# Patient Record
Sex: FEMALE | Race: WHITE | NOT HISPANIC OR LATINO | Employment: OTHER | URBAN - METROPOLITAN AREA
[De-identification: names, ages, dates, MRNs, and addresses within clinical notes are randomized per-mention and may not be internally consistent; named-entity substitution may affect disease eponyms.]

---

## 2017-01-03 ENCOUNTER — OFFICE VISIT (OUTPATIENT)
Dept: INTERNAL MEDICINE | Facility: CLINIC | Age: 72
End: 2017-01-03
Payer: MEDICARE

## 2017-01-03 VITALS
DIASTOLIC BLOOD PRESSURE: 70 MMHG | BODY MASS INDEX: 24.12 KG/M2 | HEART RATE: 96 BPM | WEIGHT: 153.69 LBS | TEMPERATURE: 97 F | OXYGEN SATURATION: 99 % | SYSTOLIC BLOOD PRESSURE: 118 MMHG | HEIGHT: 67 IN

## 2017-01-03 DIAGNOSIS — T50.Z95A VACCINE REACTION, INITIAL ENCOUNTER: Primary | ICD-10-CM

## 2017-01-03 PROCEDURE — 1126F AMNT PAIN NOTED NONE PRSNT: CPT | Mod: S$GLB,,, | Performed by: NURSE PRACTITIONER

## 2017-01-03 PROCEDURE — 1157F ADVNC CARE PLAN IN RCRD: CPT | Mod: S$GLB,,, | Performed by: NURSE PRACTITIONER

## 2017-01-03 PROCEDURE — 99999 PR PBB SHADOW E&M-EST. PATIENT-LVL IV: CPT | Mod: PBBFAC,,, | Performed by: NURSE PRACTITIONER

## 2017-01-03 PROCEDURE — 1160F RVW MEDS BY RX/DR IN RCRD: CPT | Mod: S$GLB,,, | Performed by: NURSE PRACTITIONER

## 2017-01-03 PROCEDURE — 99213 OFFICE O/P EST LOW 20 MIN: CPT | Mod: S$GLB,,, | Performed by: NURSE PRACTITIONER

## 2017-01-03 PROCEDURE — 1159F MED LIST DOCD IN RCRD: CPT | Mod: S$GLB,,, | Performed by: NURSE PRACTITIONER

## 2017-01-03 NOTE — PROGRESS NOTES
Subjective:       Patient ID: Renee Atreaga is a 71 y.o. female.    Chief Complaint: Medication Reaction (localized reaction to flu shot in left arm)    HPI Comments: Patient presents today with redness to the left arm. She reports getting her flu shot on Friday and the redness appeared about 24 hours later.     Medication Reaction   This is a new problem. The current episode started in the past 7 days. The problem occurs constantly. The problem has been gradually improving. Associated symptoms include a rash (LUE). Pertinent negatives include no chest pain, chills, fever, vomiting or weakness. Nothing aggravates the symptoms. She has tried nothing for the symptoms. The treatment provided no relief.     Review of Systems   Constitutional: Negative for chills and fever.   Cardiovascular: Negative for chest pain.   Gastrointestinal: Negative for vomiting.   Skin: Positive for rash (LUE).   Neurological: Negative for weakness.       Objective:      Physical Exam   Constitutional: She is oriented to person, place, and time. She appears well-developed and well-nourished. No distress.   HENT:   Head: Normocephalic and atraumatic.   Neurological: She is alert and oriented to person, place, and time.   Skin:        Psychiatric: She has a normal mood and affect.   Vitals reviewed.      Assessment:       1. Vaccine reaction, initial encounter        Plan:   Vaccine reaction, initial encounter  Comments:  localized skin reaction; ice 15-20 min 3-4 times daily, tylenol PRN    Informed patient this appears to be a localized reaction and is self-limiting. RTC for any new or worsening symptom. Patient verbalized understanding and agreement  Return if symptoms worsen or fail to improve.

## 2017-01-04 ENCOUNTER — OFFICE VISIT (OUTPATIENT)
Dept: DERMATOLOGY | Facility: CLINIC | Age: 72
End: 2017-01-04
Payer: MEDICARE

## 2017-01-04 DIAGNOSIS — L82.1 SEBORRHEIC KERATOSIS: Primary | ICD-10-CM

## 2017-01-04 PROCEDURE — 1160F RVW MEDS BY RX/DR IN RCRD: CPT | Mod: S$GLB,,,

## 2017-01-04 PROCEDURE — 1157F ADVNC CARE PLAN IN RCRD: CPT | Mod: S$GLB,,,

## 2017-01-04 PROCEDURE — 1159F MED LIST DOCD IN RCRD: CPT | Mod: S$GLB,,,

## 2017-01-04 PROCEDURE — 99212 OFFICE O/P EST SF 10 MIN: CPT | Mod: S$GLB,,,

## 2017-01-04 PROCEDURE — 99999 PR PBB SHADOW E&M-EST. PATIENT-LVL III: CPT | Mod: PBBFAC,,,

## 2017-01-04 NOTE — MR AVS SNAPSHOT
[facility] - Dermatology  [address] [facility] [address] [place] [address]  Phone: [phone]  Fax: [phone]                  Renee Arteaga   2017 1:00 PM   Office Visit    Description:  Female : 1945   Provider:  Elisa Posey NP   Department[facility]  [facility]           Reason for Visit     Spot                To Do List           Future Appointments        Provider Department Dept Phone    2017 1:00 PM Elisa Posey NP [facility] Dermatology [phone]    2017 1:45 PM [facility] [phone]      Goals (5 Years of Data)     None      [facility]     [facility] On Call Nurse Care Line -  Assistance  Registered nurses in the [facility] On Call Center provide clinical advisement, health education, appointment booking, and other advisory services.  Call for this free service at [phone].             Medications           Message regarding Medications     Verify the changes and/or additions to your medication regime listed below are the same as discussed with your clinician today.  If any of these changes or additions are incorrect, please notify your healthcare provider.             Verify that the below list of medications is an accurate representation of the medications you are currently taking.  If none reported, the list may be blank. If incorrect, please contact your healthcare provider. Carry this list with you in case of emergency.           Current Medications     aspirin (ECOTRIN) 81 MG EC tablet Take 1 tablet by mouth Daily. 1 Tablet, Delayed Release (E.C.) Oral Every day    clonazePAM (KLONOPIN) 0.5 MG tablet Take 1 tablet (0.5 mg total) by mouth every evening.    cyclobenzaprine (FLEXERIL) 5 MG tablet Take 1 tablet (5 mg total) by mouth 3 (three) times daily as needed for Muscle spasms.    fenofibrate (TRICOR) 54 MG tablet Take 1 tablet (54 mg total) by mouth once daily.    metoprolol succinate (TOPROL-XL) 25 MG 24 hr tablet Take 25  mg by mouth once daily.    pantoprazole (PROTONIX) 40 MG tablet Take 1 tablet (40 mg total) by mouth once daily.    pravastatin (PRAVACHOL) 40 MG tablet Take 1 tablet by mouth Daily. 1 Tablet Oral Every day           Clinical Reference Information           Allergies as of 1/4/2017     No Known Allergies      Immunizations Administered on Date of Encounter - 1/4/2017     None      [facility] Sign-Up     Activating your [facility] account is as easy as 1-2-3!     1) Visit [email or web address], select Sign Up Now, enter this activation code and your date of birth, then select Next.  [email or web address]  Expires: 2/13/2017 11:51 AM      2) Create a username and password to use when you visit [facility] in the future and select a security question in case you lose your password and select Next.    3) Enter your e-mail address and click Sign Up!    Additional Information  If you have questions, please e-mail [email or web address] or call [phone] to talk to our [facility] staff. Remember, [facility] is NOT to be used for urgent needs. For medical emergencies, dial 911.         Instructions    SEBORRHEIC KERATOSES        What causes seborrheic keratoses?    Seborrheic keratoses are harmless, common skin growths that first appear during adult life.  As time goes by, more growths appear.  Some persons have a very large number of them.  Seborrheic keratoses appear on both covered and uncovered parts of the body; they are not caused by sunlight.  The tendency to develop seborrheic keratoses is inherited.    Seborrheic keratoses are harmless and never become malignant.  They begin as slightly raised, light brown spots.  Gradually they thicken and take on a rough wartlike surface.  They slowly darken and may turn black.  These color changes are harmless.  Seborrheic keratoses are superficial and look as if they were stuck on the skin.  Persons who have had several seborrheic keratoses can usually recognize this type of benign growth.   However, if you are concerned or unsure about any growth, consult me.    Treatment    Seborrheic keratoses can easily be removed in the office.  The only reason for removing a seborrheic keratosis is your wish to get rid of it.

## 2017-01-04 NOTE — PATIENT INSTRUCTIONS
SEBORRHEIC KERATOSES        What causes seborrheic keratoses?    Seborrheic keratoses are harmless, common skin growths that first appear during adult life.  As time goes by, more growths appear.  Some persons have a very large number of them.  Seborrheic keratoses appear on both covered and uncovered parts of the body; they are not caused by sunlight.  The tendency to develop seborrheic keratoses is inherited.    Seborrheic keratoses are harmless and never become malignant.  They begin as slightly raised, light brown spots.  Gradually they thicken and take on a rough wartlike surface.  They slowly darken and may turn black.  These color changes are harmless.  Seborrheic keratoses are superficial and look as if they were stuck on the skin.  Persons who have had several seborrheic keratoses can usually recognize this type of benign growth.  However, if you are concerned or unsure about any growth, consult me.    Treatment    Seborrheic keratoses can easily be removed in the office.  The only reason for removing a seborrheic keratosis is your wish to get rid of it.

## 2017-01-04 NOTE — PROGRESS NOTES
Subjective:       Patient ID:  Renee Arteaga is a 71 y.o. female who presents for   Chief Complaint   Patient presents with    Spot     dark spot on left leg. x3 months. no treament.     Spot  - Initial  Affected locations: left lower leg  Duration: 3 months  Signs / symptoms: asymptomatic  Aggravated by: nothing  Relieving factors/Treatments tried: nothing    Pt declines complete skin exam today    PMH:  No h/o MM or NMSC    Review of Systems   Constitutional: Negative for fever, chills and fatigue.   Skin: Positive for activity-related sunscreen use. Negative for daily sunscreen use.   Hematologic/Lymphatic: Does not bruise/bleed easily (on asa).        Objective:    Physical Exam   Constitutional: She appears well-developed and well-nourished. No distress.   Neurological: She is alert and oriented to person, place, and time. She is not disoriented.   Psychiatric: She has a normal mood and affect.   Skin:   Areas Examined (abnormalities noted in diagram):   LLE Inspection Performed              Diagram Legend     Erythematous scaling macule/papule c/w actinic keratosis       Vascular papule c/w angioma      Pigmented verrucoid papule/plaque c/w seborrheic keratosis      Yellow umbilicated papule c/w sebaceous hyperplasia      Irregularly shaped tan macule c/w lentigo     1-2 mm smooth white papules consistent with Milia      Movable subcutaneous cyst with punctum c/w epidermal inclusion cyst      Subcutaneous movable cyst c/w pilar cyst      Firm pink to brown papule c/w dermatofibroma      Pedunculated fleshy papule(s) c/w skin tag(s)      Evenly pigmented macule c/w junctional nevus     Mildly variegated pigmented, slightly irregular-bordered macule c/w mildly atypical nevus      Flesh colored to evenly pigmented papule c/w intradermal nevus       Pink pearly papule/plaque c/w basal cell carcinoma      Erythematous hyperkeratotic cursted plaque c/w SCC      Surgical scar with no sign of skin cancer recurrence       Open and closed comedones      Inflammatory papules and pustules      Verrucoid papule consistent consistent with wart     Erythematous eczematous patches and plaques     Dystrophic onycholytic nail with subungual debris c/w onychomycosis     Umbilicated papule    Erythematous-base heme-crusted tan verrucoid plaque consistent with inflamed seborrheic keratosis     Erythematous Silvery Scaling Plaque c/w Psoriasis     See annotation      Assessment / Plan:        Seborrheic keratosis- left lower leg   These are benign inherited growths without a malignant potential. Reassurance given to patient. No treatment is necessary.            Return if symptoms worsen or fail to improve.

## 2017-01-18 RX ORDER — METOPROLOL SUCCINATE 25 MG/1
25 TABLET, EXTENDED RELEASE ORAL DAILY
Qty: 90 TABLET | Refills: 3 | Status: SHIPPED | OUTPATIENT
Start: 2017-01-18 | End: 2017-03-29 | Stop reason: SDUPTHER

## 2017-03-06 ENCOUNTER — HOSPITAL ENCOUNTER (OUTPATIENT)
Dept: RADIOLOGY | Facility: HOSPITAL | Age: 72
Discharge: HOME | End: 2017-03-06
Attending: ORTHOPAEDIC SURGERY
Payer: MEDICARE

## 2017-03-06 ENCOUNTER — OFFICE VISIT (OUTPATIENT)
Dept: ORTHOPEDICS | Facility: CLINIC | Age: 72
End: 2017-03-06
Payer: MEDICARE

## 2017-03-06 VITALS
DIASTOLIC BLOOD PRESSURE: 77 MMHG | BODY MASS INDEX: 24.22 KG/M2 | SYSTOLIC BLOOD PRESSURE: 133 MMHG | WEIGHT: 154.31 LBS | HEART RATE: 82 BPM | HEIGHT: 67 IN

## 2017-03-06 DIAGNOSIS — M25.552 BILATERAL HIP PAIN: ICD-10-CM

## 2017-03-06 DIAGNOSIS — M25.551 BILATERAL HIP PAIN: ICD-10-CM

## 2017-03-06 DIAGNOSIS — M81.0 SENILE OSTEOPOROSIS: ICD-10-CM

## 2017-03-06 DIAGNOSIS — M25.551 BILATERAL HIP PAIN: Primary | ICD-10-CM

## 2017-03-06 DIAGNOSIS — M54.5 CHRONIC LOW BACK PAIN, UNSPECIFIED BACK PAIN LATERALITY, WITH SCIATICA PRESENCE UNSPECIFIED: ICD-10-CM

## 2017-03-06 DIAGNOSIS — M47.817 LUMBOSACRAL SPONDYLOSIS WITHOUT MYELOPATHY: ICD-10-CM

## 2017-03-06 DIAGNOSIS — G89.29 CHRONIC LOW BACK PAIN, UNSPECIFIED BACK PAIN LATERALITY, WITH SCIATICA PRESENCE UNSPECIFIED: ICD-10-CM

## 2017-03-06 DIAGNOSIS — M81.8 IDIOPATHIC OSTEOPOROSIS: ICD-10-CM

## 2017-03-06 DIAGNOSIS — M16.12 ARTHRITIS OF LEFT HIP: Primary | ICD-10-CM

## 2017-03-06 DIAGNOSIS — M16.11 ARTHRITIS OF RIGHT HIP: ICD-10-CM

## 2017-03-06 DIAGNOSIS — M25.552 BILATERAL HIP PAIN: Primary | ICD-10-CM

## 2017-03-06 PROCEDURE — 72110 X-RAY EXAM L-2 SPINE 4/>VWS: CPT | Mod: 26,,, | Performed by: RADIOLOGY

## 2017-03-06 PROCEDURE — 1160F RVW MEDS BY RX/DR IN RCRD: CPT | Mod: S$GLB,,, | Performed by: ORTHOPAEDIC SURGERY

## 2017-03-06 PROCEDURE — 72220 X-RAY EXAM SACRUM TAILBONE: CPT | Mod: 26,,, | Performed by: RADIOLOGY

## 2017-03-06 PROCEDURE — 99204 OFFICE O/P NEW MOD 45 MIN: CPT | Mod: S$GLB,,, | Performed by: ORTHOPAEDIC SURGERY

## 2017-03-06 PROCEDURE — 72110 X-RAY EXAM L-2 SPINE 4/>VWS: CPT | Mod: TC

## 2017-03-06 PROCEDURE — 1159F MED LIST DOCD IN RCRD: CPT | Mod: S$GLB,,, | Performed by: ORTHOPAEDIC SURGERY

## 2017-03-06 PROCEDURE — 72220 X-RAY EXAM SACRUM TAILBONE: CPT | Mod: TC

## 2017-03-06 PROCEDURE — 1157F ADVNC CARE PLAN IN RCRD: CPT | Mod: S$GLB,,, | Performed by: ORTHOPAEDIC SURGERY

## 2017-03-06 PROCEDURE — 73521 X-RAY EXAM HIPS BI 2 VIEWS: CPT | Mod: 26,,, | Performed by: RADIOLOGY

## 2017-03-06 PROCEDURE — 1125F AMNT PAIN NOTED PAIN PRSNT: CPT | Mod: S$GLB,,, | Performed by: ORTHOPAEDIC SURGERY

## 2017-03-06 PROCEDURE — 73521 X-RAY EXAM HIPS BI 2 VIEWS: CPT | Mod: TC

## 2017-03-06 PROCEDURE — 99999 PR PBB SHADOW E&M-EST. PATIENT-LVL III: CPT | Mod: PBBFAC,,, | Performed by: ORTHOPAEDIC SURGERY

## 2017-03-06 RX ORDER — MELOXICAM 15 MG/1
15 TABLET ORAL DAILY
Qty: 30 TABLET | Refills: 2 | Status: SHIPPED | OUTPATIENT
Start: 2017-03-06 | End: 2017-09-01 | Stop reason: SDUPTHER

## 2017-03-06 NOTE — MR AVS SNAPSHOT
[facility]  [address]  [place] [address]  Phone: [phone]  Fax: [phone]                  Renee Arteaga   3/6/2017 1:00 PM   Office Visit    Description:  Female : 1945   Provider:  Jc Miranda MD   Department:  [facility]           Reason for Visit     Left Thigh - Pain           Diagnoses this Visit        Comments    Arthritis of left hip    -  Primary     Senile osteoporosis         Idiopathic osteoporosis         Arthritis of right hip         Lumbosacral spondylosis without myelopathy                To Do List           Future Appointments        Provider Department Dept Phone    2017 11:00 AM [facility] [phone]    2017 11:30 AM ON [facility] [phone]      Goals (5 Years of Data)     None      Follow-Up and Disposition     Return in about 5 weeks (around 4/10/2017).       These Medications        Disp Refills Start End    meloxicam (MOBIC) 15 MG tablet 30 tablet 2 3/6/2017     Take 1 tablet (15 mg total) by mouth once daily. - Oral    Pharmacy: [facility] [id number] [place] [address] Ph #: [phone]         [facility]     [facility] Care [facility] -  Assistance  Registered nurses in the [facility] Center provide clinical advisement, health education, appointment booking, and other advisory services.  Call for this free service at [phone].             Medications           Message regarding Medications     Verify the changes and/or additions to your medication regime listed below are the same as discussed with your clinician today.  If any of these changes or additions are incorrect, please notify your healthcare provider.        START taking these NEW medications        Refills    meloxicam (MOBIC) 15 MG tablet 2    Sig: Take 1 tablet (15 mg total) by mouth once daily.    Class: Normal    Route: Oral           Verify that  "the below list of medications is an accurate representation of the medications you are currently taking.  If none reported, the list may be blank. If incorrect, please contact your healthcare provider. Carry this list with you in case of emergency.           Current Medications     aspirin (ECOTRIN) 81 MG EC tablet Take 1 tablet by mouth Daily. 1 Tablet, Delayed Release (E.C.) Oral Every day    clonazePAM (KLONOPIN) 0.5 MG tablet Take 1 tablet (0.5 mg total) by mouth every evening.    fenofibrate (TRICOR) 54 MG tablet Take 1 tablet (54 mg total) by mouth once daily.    meloxicam (MOBIC) 15 MG tablet Take 1 tablet (15 mg total) by mouth once daily.    metoprolol succinate (TOPROL-XL) 25 MG 24 hr tablet Take 1 tablet (25 mg total) by mouth once daily.    pantoprazole (PROTONIX) 40 MG tablet Take 1 tablet (40 mg total) by mouth once daily.    pravastatin (PRAVACHOL) 40 MG tablet Take 1 tablet by mouth Daily. 1 Tablet Oral Every day           Clinical Reference Information           Your Vitals Were     BP Pulse Height Weight BMI    133/77 82 5' 7" (1.702 m) 70 kg (154 lb 5.2 oz) 24.17 kg/m2      Blood Pressure          Most Recent Value    BP  133/77      Allergies as of 3/6/2017     No Known Allergies      Immunizations Administered on Date of Encounter - 3/6/2017     None      Orders Placed During Today's Visit     Future Labs/Procedures Expected by Expires    DXA Bone Density Spine And Hip  3/6/2017 3/6/2018      [facility] Sign-Up     Activating your [facility] account is as easy as 1-2-3!     1) Visit [email or web address], select Sign Up Now, enter this activation code and your date of birth, then select Next.  [email or web address]  Expires: 4/20/2017  2:29 PM      2) Create a username and password to use when you visit [facility] in the future and select a security question in case you lose your password and select Next.    3) Enter your e-mail address and click Sign Up!    Additional Information  If you have questions, " please e-mail [email or web address] or call [phone] to talk to our [facility] staff. Remember, [facility] is NOT to be used for urgent needs. For medical emergencies, dial 911.         Language Assistance Services     ATTENTION: Language assistance services are available, free of charge. Please call [phone].      ATENCIÓN: Si habla español, tiene a baum disposición servicios gratuitos de asistencia lingüística. Llame al [phone].     CHÚ Ý: N?u b?n nói Ti?ng Vi?t, có các d?ch v? h? tr? ngôn ng? mi?n phí dành cho b?n. G?i s? [phone].         [facility] complies with applicable Federal civil rights laws and does not discriminate on the basis of race, color, national origin, age, disability, or sex.

## 2017-03-06 NOTE — PROGRESS NOTES
Subjective:      Patient ID: Renee Arteaga is a 71 y.o. female.    Chief Complaint: Pain of the Left Thigh    HPI: The patient is seen today with discomfort in her left hip and leg and perhaps knee.  She has some radiating discomfort into the anterior thigh.  She has left groin pain.  She has some degree of right hip discomfort however her left hip hurts worse.  She rates her current level of discomfort at a 3 on a pain scale of 1-10.  Housekeeping and vacuuming or getting more and more difficult for the patient.  Review of Systems   Constitution: Negative for chills, decreased appetite, weight gain and weight loss.   HENT: Negative for congestion, ear pain, hearing loss and sore throat.    Eyes: Negative for blurred vision, double vision, vision loss in left eye, vision loss in right eye and visual disturbance.   Cardiovascular: Negative for chest pain, irregular heartbeat, leg swelling and palpitations.   Respiratory: Negative for cough and shortness of breath.    Endocrine: Negative for cold intolerance and heat intolerance.   Hematologic/Lymphatic: Negative for adenopathy. Does not bruise/bleed easily.   Skin: Negative for color change, nail changes, rash and suspicious lesions.   Musculoskeletal: Positive for arthritis and joint pain.   Gastrointestinal: Negative for abdominal pain, constipation, diarrhea, heartburn, nausea and vomiting.   Genitourinary: Negative for bladder incontinence and dysuria.   Neurological: Negative for dizziness, paresthesias, sensory change and tremors.   Psychiatric/Behavioral: Negative for altered mental status, depression, memory loss and substance abuse.   Allergic/Immunologic: Negative for hives and persistent infections.         Objective:            General    Nursing note and vitals reviewed.  Constitutional: She is oriented to person, place, and time. She appears well-developed and well-nourished.   HENT:   Head: Normocephalic.   Nose: Nose normal.   Eyes: EOM are normal.  Pupils are equal, round, and reactive to light.   Neck: Normal range of motion. Neck supple.   Cardiovascular: Normal rate and regular rhythm.    Pulmonary/Chest: Effort normal.   Abdominal: Soft. She exhibits no distension. There is no tenderness.   Neurological: She is alert and oriented to person, place, and time.   Psychiatric: She has a normal mood and affect. Her behavior is normal.     General Musculoskeletal Exam   Gait: antalgic   Pelvic Obliquity: none    Right Ankle/Foot Exam   Right ankle exam is normal.    Range of Motion   The patient has normal right ankle ROM.    Alignment   Forefoot Alignment: normal    Muscle Strength   The patient has normal right ankle strength.    Tests   Anterior drawer: negative  Varus tilt: negative  Heel Walk: able to perform  Tiptoe Walk: able to perform    Other   Sensation: normal  Peroneal Subluxation: negative    Left Ankle/Foot Exam   Left ankle exam is normal.    Range of Motion   The patient has normal left ankle ROM.     Alignment   Forefoot Alignment: normal    Muscle Strength   The patient has normal left ankle strength.    Tests   Anterior drawer: negative  Varus tilt: negative  Heel Walk: able to perform  Tiptoe Walk: able to perform    Other   Sensation: normal  Peroneal Subluxation: negative    Right Knee Exam   Right knee exam is normal.    Inspection   Erythema: absent  Swelling: absent  Effusion: effusion  Deformity: deformity  Bruising: absent    Tenderness   The patient is tender to palpation of the patella.    Crepitus   The patient has crepitus of the patella.    Range of Motion   The patient has normal right knee ROM.    Tests   Meniscus   Fernanda:  Medial - negative Lateral - negative  Ligament Examination Lachman: normal (-1 to 2mm) PCL-Posterior Drawer: normal (0 to 2mm)     MCL - Valgus: normal (0 to 2mm)  LCL - Varus: normalPivot Shift: normal (Equal)  Posterolateral Corner: unstable (>15 degrees difference)  Patella   Patellar Apprehension:  negative  Passive Patellar Tilt: neutral  Patellar Tracking: normal  Patellar Grind: positive    Other   Sensation: normal    Left Knee Exam   Left knee exam is normal.    Inspection   Erythema: absent  Swelling: absent  Effusion: absent  Deformity: deformity  Bruising: absent    Tenderness   The patient tender to palpation of the patella.    Crepitus   The patient has crepitus of the patella.    Range of Motion   The patient has normal left knee ROM.    Tests   Meniscus   Fernanda:  Medial - negative Lateral - negative  Stability Lachman: normal (-1 to 2mm) PCL-Posterior Drawer: normal (0 to 2mm)  MCL - Valgus: normal (0 to 2mm)  LCL - Varus: normal (0 to 2mm)Pivot Shift: normal (Equal)  Posterolateral Corner: unstable (>15 degrees difference)  Patella   Patellar Apprehension: negative  Passive Patellar Tilt: neutral  Patellar Tracking: normal  Patellar Grind: positive    Other   Sensation: normal    Right Hip Exam     Inspection   Swelling: absent  Bruising: absent    Tenderness   The patient tender to palpation of the rectus insertion.    Range of Motion   Internal Rotation: abnormal   External Rotation: abnormal   Abduction: abnormal   Adduction: abnormal     Muscle Strength   The patient has normal right hip strength.    Tests   Pain w/ forced internal rotation (MICHELLE): present  Pain w/ forced external rotation (FADIR): present  Log Roll: positive    Other   Sensation: normal  Left Hip Exam     Inspection   Swelling: absent  Bruising: absent    Tenderness   The patient tender to palpation of the rectus insertion.    Range of Motion   Internal Rotation: abnormal   External Rotation: abnormal   Abduction: abnormal   Adduction: abnormal     Muscle Strength   The patient has normal left hip strength.     Tests   Pain w/ forced external rotation (FADIR): present  Log Roll: positive    Other   Sensation: normal    Comments:  The patient has more stiffness in the left hip than the right hip.      Back (L-Spine &  T-Spine) / Neck (C-Spine) Exam     Tenderness Posterior midline palpation reveals tenderness of the Lower L-Spine. Right paramedian tenderness of the Lower L-Spine. Left paramedian tenderness of the Lower L-Spine.     Back (L-Spine & T-Spine) Range of Motion   Extension: normal   Flexion: normal     Neck (C-Spine) Range of Motion   Flexion:     Normal  Extension: Normal  Right Lateral Bend: normal  Left Lateral Bend: normal  Right Rotation: normal  Left Rotation: normal    Spinal Sensation   Right Side Sensation  C-Spine Level: normal   L-Spine Level: normal  S-Spine Level: normal  T-Spine Level: normal  Left Side Sensation  C-Spine Level: normal  L-Spine Level: normal  S-Spine Level: normal  T-Spine Level: normal    Other She has no scoliosis .  Head Tilt:  Negative      Right Hand/Wrist Exam   Right hand exam is normal.    Inspection   Deformity: Wrist - deformity     Range of Motion   The patient has normal right hand/wrist ROM.    Tests   Phalens Sign: negative  Tinels Sign (Medial Nerve): negative  Finkelstein: negative  Cubital Tunnel Compression Test: negative      Other     Neuorologic Exam    Median Distribution: normal  Ulnar Distribution: normal  Radial Distribution: normal      Left Hand/Wrist Exam   Left hand exam is normal.    Inspection   Deformity: Wrist - absent     Range of Motion   The patient has normal left hand/wrist ROM.    Tests   Phalens Sign: negative  Tinels Sign (Medial Nerve): negative  Finkelstein: negative  Cubital Tunnel Compression Test: negative      Other     Sensory Exam  Median Distribution: normal  Ulnar Distribution: normal  Radial Distribution: normal      Right Elbow Exam   Right elbow exam is normal.    Inspection   Effusion: absent  Bruising: absent  Deformity: absent    Range of Motion   The patient has normal right elbow ROM.    Tests Tinel's Sign (cubital tunnel): negative    Other   Sensation: normal      Left Elbow Exam   Left elbow exam is normal.    Inspection    Effusion: absent  Bruising: absent  Deformity: absent    Range of Motion   The patient has normal left elbow ROM.    Tests Tinel's Sign (cubital tunnel): negative    Other   Sensation: normal    Right Shoulder Exam   Right shoulder exam is normal.    Range of Motion   Active Abduction: normal   Passive Abduction: normal   Extension: normal   Forward Flexion: normal   Forward Elevation: normal  Adduction: normal  External Rotation 0 degrees: normal   Internal Rotation 0 degrees: normal     Muscle Strength   The patient has normal right shoulder strength.    Tests & Signs   Apprehension: negative  Cross Arm: negative  Impingement: negative    Other   Sensation: normal    Left Shoulder Exam   Left shoulder exam is normal.    Range of Motion   Active Abduction: normal   Passive Abduction: normal   Extension: normal   Forward Flexion: normal   Forward Elevation: normal  Adduction: normal  External Rotation 0 degrees: normal   Internal Rotation 0 degrees: normal     Muscle Strength   The patient has normal left shoulder strength.    Tests & Signs   Apprehension: negative  Cross Arm: negative  Impingement: negative    Other   Sensation: normal       Muscle Strength   Right Upper Extremity   Wrist Extension: 5/5/5   Wrist Flexion: 5/5/5   : 5/5/5   Elbow Pronation:  5/5   Elbow Supination:  5/5   Elbow Extension: 5/5  Elbow Flexion: 5/5  Intrinsics: 5/5  Left Upper Extremity  Wrist Extension: /5/5   Wrist Flexion: /5/5   :  5/5/5   Elbow Pronation:  5/5   Elbow Supination:  5/5   Elbow Extension: 5/5  Elbow Flexion: 5/5  Intrinsics: 5/5  Right Lower Extremity   Hip Abduction: 5/5   Quadriceps:  5/5   Hamstrin/5   Left Lower Extremity   Hip Abduction: 5/5   Quadriceps:  5/5   Hamstrin/5     Reflexes     Left Side  Biceps:  2+  Triceps:  2+  Quadriceps:  2+  Achilles:  2+    Right Side   Biceps:  2+  Triceps:  2+  Quadriceps:  2+  Achilles:  2+    Vascular Exam     Right Pulses  Dorsalis Pedis:       2+  Posterior Tibial:      2+  Radial:                    2+      Left Pulses  Dorsalis Pedis:      2+  Posterior Tibial:      2+  Radial:                    2+      Capillary Refill  Right Hand: normal capillary refill  Left Hand: normal capillary refill                X-RAY:   Comparison: None     Bilateral standing ap knees  bilateral sunrise views  left lateral    Findings:    Juxta-articular osteopenia.  Minimal degenerative changes bilaterally.  No fracture, dislocation or LEFT effusion.  Normal and symmetric articulation at the patellofemoral joints.     AP pelvis and two views both hips.    Findings: There is generalized osteopenia.  Degenerative changes present in the hips, sacroiliac joints, and lower lumbar spine.  No intraosseous lesion or radiographic evidence of AVN.  No acute fracture or dislocation.  Lumbar spine five views.    Findings: There is generalized osteopenia.  Development of sacralization of the L5 vertebral segment.  Advanced degenerative changes with vertebral endplate spurring, disc space narrowing, vacuum disc phenomenon, and facet arthropathy at multiple levels.  Pedicles appear intact.  Very minimal retrolisthesis suggested at L2-L3 and L3-L4.  No compression fracture.  Sacrum and coccyx three views.    Findings: There is developmental partial sacralization of the L5 vertebral segment.  Degenerative changes in the hips, sacroiliac joints, and lower lumbar spine.  No fracture or intraosseous lesion.        Assessment:             Encounter Diagnoses   Name Primary?    Senile osteoporosis     Idiopathic osteoporosis     Arthritis of left hip Yes    Arthritis of right hip     Lumbosacral spondylosis without myelopathy           Plan:     The patient was started on Bobek 7.5 mg daily ×3 days and then 15 mg daily thereafter with food if she tolerates the medicine.  She is to take her usual dose of Protonix, 40 mg daily on a regular basis.  If the patient does not tolerate Mobic, she  will contact the office and we will try her on Celebrex.  If and when the patient's discomfort level increases to a steady 7 on a pain scale of 1-10, she may desire to have a left total hip replacement performed.  This would be through a direct anterior approach.  The patient will be seen in follow-up in 5 weeks for clinical check on her progress.

## 2017-03-16 RX ORDER — PRAVASTATIN SODIUM 40 MG/1
40 TABLET ORAL DAILY
Qty: 90 TABLET | Refills: 3 | Status: SHIPPED | OUTPATIENT
Start: 2017-03-16 | End: 2018-10-12 | Stop reason: SDUPTHER

## 2017-03-28 ENCOUNTER — PATIENT OUTREACH (OUTPATIENT)
Dept: ADMINISTRATIVE | Facility: HOSPITAL | Age: 72
End: 2017-03-28

## 2017-03-29 ENCOUNTER — LAB VISIT (OUTPATIENT)
Dept: LAB | Facility: HOSPITAL | Age: 72
End: 2017-03-29
Attending: FAMILY MEDICINE
Payer: MEDICARE

## 2017-03-29 ENCOUNTER — OFFICE VISIT (OUTPATIENT)
Dept: INTERNAL MEDICINE | Facility: CLINIC | Age: 72
End: 2017-03-29
Payer: MEDICARE

## 2017-03-29 VITALS
SYSTOLIC BLOOD PRESSURE: 106 MMHG | HEART RATE: 62 BPM | TEMPERATURE: 98 F | HEIGHT: 67 IN | BODY MASS INDEX: 24.56 KG/M2 | OXYGEN SATURATION: 97 % | DIASTOLIC BLOOD PRESSURE: 64 MMHG | WEIGHT: 156.5 LBS

## 2017-03-29 DIAGNOSIS — I50.9 CONGESTIVE HEART FAILURE, UNSPECIFIED CONGESTIVE HEART FAILURE CHRONICITY, UNSPECIFIED CONGESTIVE HEART FAILURE TYPE: ICD-10-CM

## 2017-03-29 DIAGNOSIS — Z13.9 SCREENING: Primary | ICD-10-CM

## 2017-03-29 DIAGNOSIS — G47.00 INSOMNIA, UNSPECIFIED TYPE: ICD-10-CM

## 2017-03-29 DIAGNOSIS — K21.9 GASTROESOPHAGEAL REFLUX DISEASE WITHOUT ESOPHAGITIS: ICD-10-CM

## 2017-03-29 DIAGNOSIS — Z13.9 SCREENING: ICD-10-CM

## 2017-03-29 PROCEDURE — 1157F ADVNC CARE PLAN IN RCRD: CPT | Mod: S$GLB,,, | Performed by: FAMILY MEDICINE

## 2017-03-29 PROCEDURE — 36415 COLL VENOUS BLD VENIPUNCTURE: CPT

## 2017-03-29 PROCEDURE — 99213 OFFICE O/P EST LOW 20 MIN: CPT | Mod: S$GLB,,, | Performed by: FAMILY MEDICINE

## 2017-03-29 PROCEDURE — 86803 HEPATITIS C AB TEST: CPT

## 2017-03-29 PROCEDURE — 1160F RVW MEDS BY RX/DR IN RCRD: CPT | Mod: S$GLB,,, | Performed by: FAMILY MEDICINE

## 2017-03-29 PROCEDURE — 99999 PR PBB SHADOW E&M-EST. PATIENT-LVL III: CPT | Mod: PBBFAC,,, | Performed by: FAMILY MEDICINE

## 2017-03-29 PROCEDURE — 1159F MED LIST DOCD IN RCRD: CPT | Mod: S$GLB,,, | Performed by: FAMILY MEDICINE

## 2017-03-29 PROCEDURE — 1126F AMNT PAIN NOTED NONE PRSNT: CPT | Mod: S$GLB,,, | Performed by: FAMILY MEDICINE

## 2017-03-29 RX ORDER — PRAVASTATIN SODIUM 40 MG/1
40 TABLET ORAL DAILY
Qty: 90 TABLET | Refills: 3 | Status: CANCELLED | OUTPATIENT
Start: 2017-03-29

## 2017-03-29 RX ORDER — PANTOPRAZOLE SODIUM 40 MG/1
40 TABLET, DELAYED RELEASE ORAL DAILY
Qty: 30 TABLET | Refills: 6 | Status: SHIPPED | OUTPATIENT
Start: 2017-03-29 | End: 2017-09-01 | Stop reason: SDUPTHER

## 2017-03-29 RX ORDER — METOPROLOL SUCCINATE 25 MG/1
25 TABLET, EXTENDED RELEASE ORAL DAILY
Qty: 90 TABLET | Refills: 3 | Status: SHIPPED | OUTPATIENT
Start: 2017-03-29 | End: 2017-09-01 | Stop reason: SDUPTHER

## 2017-03-29 RX ORDER — ASPIRIN 81 MG/1
81 TABLET ORAL
Status: CANCELLED | COMMUNITY
Start: 2017-03-29

## 2017-03-29 RX ORDER — MELOXICAM 15 MG/1
15 TABLET ORAL DAILY
Qty: 30 TABLET | Refills: 2 | Status: CANCELLED | OUTPATIENT
Start: 2017-03-29

## 2017-03-29 RX ORDER — FENOFIBRATE 54 MG/1
54 TABLET ORAL DAILY
Qty: 30 TABLET | Refills: 11 | Status: CANCELLED | OUTPATIENT
Start: 2017-03-29

## 2017-03-29 RX ORDER — CLONAZEPAM 0.5 MG/1
0.5 TABLET ORAL NIGHTLY
Qty: 30 TABLET | Refills: 1 | Status: SHIPPED | OUTPATIENT
Start: 2017-03-29 | End: 2017-06-09 | Stop reason: SDUPTHER

## 2017-03-29 NOTE — PROGRESS NOTES
Subjective:       Patient ID: Renee Arteaga is a 71 y.o. female.    Chief Complaint: Medication Refill    HPI Ms. Arteaga presents today with her  for medication refills. She has no complaints today.   Dxa scan ordered by ortho scheduled for August.     Review of Systems   Constitutional: Negative for activity change, appetite change, fatigue and fever.   HENT: Negative for congestion, ear pain and sinus pressure.    Eyes: Negative for pain and visual disturbance.   Respiratory: Negative for cough, chest tightness and wheezing.    Cardiovascular: Negative for chest pain, palpitations and leg swelling.   Gastrointestinal: Negative for abdominal distention, abdominal pain, constipation, diarrhea, nausea and vomiting.   Endocrine: Negative for polydipsia and polyuria.   Genitourinary: Negative for difficulty urinating, dyspareunia, dysuria, flank pain and hematuria.   Musculoskeletal: Negative for arthralgias and back pain.   Skin: Negative for rash.   Neurological: Negative for dizziness, tremors, syncope, weakness, numbness and headaches.   Psychiatric/Behavioral: Negative for agitation and confusion.           Past Medical History:   Diagnosis Date    Anxiety     Arthritis     Depression     Heart murmur     Hyperlipidemia     Scoliosis     Tachycardia        Objective:        Physical Exam   Constitutional: She is oriented to person, place, and time. She appears well-nourished.   HENT:   Head: Normocephalic and atraumatic.   Cardiovascular: Normal heart sounds.    Pulmonary/Chest: Effort normal and breath sounds normal.   Musculoskeletal: Normal range of motion.   Neurological: She is alert and oriented to person, place, and time.   Psychiatric: She has a normal mood and affect. Her behavior is normal.   Vitals reviewed.        Assessment/Plan:   Screening  -     Hepatitis C antibody; Future; Expected date: 3/29/17    Gastroesophageal reflux disease without esophagitis  -     pantoprazole (PROTONIX) 40  MG tablet; Take 1 tablet (40 mg total) by mouth once daily.  Dispense: 30 tablet; Refill: 6    Insomnia, unspecified type  -     clonazePAM (KLONOPIN) 0.5 MG tablet; Take 1 tablet (0.5 mg total) by mouth every evening.  Dispense: 30 tablet; Refill: 1    Congestive heart failure, unspecified congestive heart failure chronicity, unspecified congestive heart failure type  -     metoprolol succinate (TOPROL-XL) 25 MG 24 hr tablet; Take 1 tablet (25 mg total) by mouth once daily.  Dispense: 90 tablet; Refill: 3      Return if symptoms worsen or fail to improve.    Odessa Julio MD  [facility]   Family Medicine

## 2017-03-29 NOTE — MR AVS SNAPSHOT
[facility]  [address]  [place] [address]  Phone: [phone]  Fax: [phone]                  Renee Arteaga   3/29/2017 11:00 AM   Office Visit    Description:  Female : 1945   Provider:  Odessa Julio MD   Department:  [facility]           Reason for Visit     Medication Refill           Diagnoses this Visit        Comments    Screening    -  Primary     Gastroesophageal reflux disease without esophagitis         Arthritis of left hip         Arthritis of right hip         Lumbosacral spondylosis without myelopathy         Hypertriglyceridemia         Insomnia, unspecified type         Congestive heart failure, unspecified congestive heart failure chronicity, unspecified congestive heart failure type                To Do List           Future Appointments        Provider Department Dept Phone    2017 11:00 AM Jc Miranda MD [facility] - Orthopedics [phone]    2017 11:30 AM [facility] [phone]      Goals (5 Years of Data)     None       These Medications        Disp Refills Start End    pantoprazole (PROTONIX) 40 MG tablet 30 tablet 6 3/29/2017     Take 1 tablet (40 mg total) by mouth once daily. - Oral    Pharmacy: [facility] [id number] [place] [address] Ph #: [phone]       metoprolol succinate (TOPROL-XL) 25 MG 24 hr tablet 90 tablet 3 3/29/2017     Take 1 tablet (25 mg total) by mouth once daily. - Oral    Pharmacy: [facility] [id number] Ph #: [phone]       clonazePAM (KLONOPIN) 0.5 MG tablet 30 tablet 1 3/29/2017     Take 1 tablet (0.5 mg total) by mouth every evening. - Oral    Pharmacy: [facility] [id number] Ph #: [phone]         [facility]     [facility] On Call Nurse Care Line -  Assistance  Registered nurses in the [facility] provide clinical advisement, health education,  "appointment booking, and other advisory services.  Call for this free service at [phone].             Medications           Message regarding Medications     Verify the changes and/or additions to your medication regime listed below are the same as discussed with your clinician today.  If any of these changes or additions are incorrect, please notify your healthcare provider.             Verify that the below list of medications is an accurate representation of the medications you are currently taking.  If none reported, the list may be blank. If incorrect, please contact your healthcare provider. Carry this list with you in case of emergency.           Current Medications     aspirin (ECOTRIN) 81 MG EC tablet Take 1 tablet by mouth Daily. 1 Tablet, Delayed Release (E.C.) Oral Every day    fenofibrate (TRICOR) 54 MG tablet Take 1 tablet (54 mg total) by mouth once daily.    meloxicam (MOBIC) 15 MG tablet Take 1 tablet (15 mg total) by mouth once daily.    pravastatin (PRAVACHOL) 40 MG tablet Take 1 tablet (40 mg total) by mouth once daily. 1 Tablet Oral Every day    clonazePAM (KLONOPIN) 0.5 MG tablet Take 1 tablet (0.5 mg total) by mouth every evening.    metoprolol succinate (TOPROL-XL) 25 MG 24 hr tablet Take 1 tablet (25 mg total) by mouth once daily.    pantoprazole (PROTONIX) 40 MG tablet Take 1 tablet (40 mg total) by mouth once daily.           Clinical Reference Information           Your Vitals Were     BP Pulse Temp Height Weight SpO2    106/64 (BP Location: Right arm, Patient Position: Sitting, BP Method: Manual) 62 97.7 °F (36.5 °C) (Tympanic) 5' 7" (1.702 m) 71 kg (156 lb 8.4 oz) 97%    BMI                24.52 kg/m2          Blood Pressure          Most Recent Value    BP  106/64      Allergies as of 3/29/2017     No Known Allergies      Immunizations Administered on Date of Encounter - 3/29/2017     None      Orders Placed During Today's Visit     Future Labs/Procedures Expected by Expires    " Hepatitis C antibody  3/29/2017 5/28/2018      [facility] Sign-Up     Activating your [facility] account is as easy as 1-2-3!     1) Visit [email or web address], select Sign Up Now, enter this activation code and your date of birth, then select Next.  [email or web address]  Expires: 4/20/2017  3:29 PM      2) Create a username and password to use when you visit [facility] in the future and select a security question in case you lose your password and select Next.    3) Enter your e-mail address and click Sign Up!    Additional Information  If you have questions, please e-mail [email or web address] or call [phone] to talk to our [facility] staff. Remember, [facility] is NOT to be used for urgent needs. For medical emergencies, dial 911.         Language Assistance Services     ATTENTION: Language assistance services are available, free of charge. Please call [phone].      ATENCIÓN: Si habla español, tiene a baum disposición servicios gratuitos de asistencia lingüística. Llame al [phone].     BRANDON Ý: N?u b?n nói Ti?ng Vi?t, có các d?ch v? h? tr? ngôn ng? mi?n phí dành cho b?n. G?i s? [phone].         [facility] complies with applicable Federal civil rights laws and does not discriminate on the basis of race, color, national origin, age, disability, or sex.

## 2017-03-30 LAB — HCV AB SERPL QL IA: NEGATIVE

## 2017-06-09 DIAGNOSIS — G47.00 INSOMNIA, UNSPECIFIED TYPE: ICD-10-CM

## 2017-06-09 RX ORDER — CLONAZEPAM 0.5 MG/1
0.5 TABLET ORAL NIGHTLY
Qty: 30 TABLET | Refills: 1 | Status: SHIPPED | OUTPATIENT
Start: 2017-06-09 | End: 2017-07-19

## 2017-07-19 ENCOUNTER — OFFICE VISIT (OUTPATIENT)
Dept: INTERNAL MEDICINE | Facility: CLINIC | Age: 72
End: 2017-07-19
Payer: MEDICARE

## 2017-07-19 VITALS
DIASTOLIC BLOOD PRESSURE: 60 MMHG | BODY MASS INDEX: 22.56 KG/M2 | WEIGHT: 143.75 LBS | OXYGEN SATURATION: 99 % | HEART RATE: 70 BPM | SYSTOLIC BLOOD PRESSURE: 118 MMHG | HEIGHT: 67 IN | TEMPERATURE: 97 F

## 2017-07-19 DIAGNOSIS — B35.3 TINEA PEDIS OF LEFT FOOT: Primary | ICD-10-CM

## 2017-07-19 DIAGNOSIS — G47.00 INSOMNIA, UNSPECIFIED TYPE: ICD-10-CM

## 2017-07-19 DIAGNOSIS — F41.9 ANXIETY: ICD-10-CM

## 2017-07-19 PROCEDURE — 1125F AMNT PAIN NOTED PAIN PRSNT: CPT | Mod: S$GLB,,, | Performed by: FAMILY MEDICINE

## 2017-07-19 PROCEDURE — 99999 PR PBB SHADOW E&M-EST. PATIENT-LVL III: CPT | Mod: PBBFAC,,, | Performed by: FAMILY MEDICINE

## 2017-07-19 PROCEDURE — 1159F MED LIST DOCD IN RCRD: CPT | Mod: S$GLB,,, | Performed by: FAMILY MEDICINE

## 2017-07-19 PROCEDURE — 99214 OFFICE O/P EST MOD 30 MIN: CPT | Mod: S$GLB,,, | Performed by: FAMILY MEDICINE

## 2017-07-19 RX ORDER — ALPRAZOLAM 0.5 MG/1
0.5 TABLET ORAL 3 TIMES DAILY
Qty: 90 TABLET | Refills: 1 | Status: SHIPPED | OUTPATIENT
Start: 2017-07-19 | End: 2017-09-01 | Stop reason: SDUPTHER

## 2017-07-19 RX ORDER — FLUCONAZOLE 200 MG/1
200 TABLET ORAL DAILY
Qty: 15 TABLET | Refills: 1 | Status: SHIPPED | OUTPATIENT
Start: 2017-07-19 | End: 2017-08-18

## 2017-07-19 NOTE — PROGRESS NOTES
Subjective:       Patient ID: Renee Arteaga is a 72 y.o. female.    Chief Complaint: Rash (left foot/x3 wks) and Anxiety    HPI Ms Arteaga presents today with a couple complaints She has a left foot rash for 3 weeks. She has been using hydrocortisone and an antifungal.  Itching. Left foot. She used antifungal 3-4 days and stopped went back to hydrocortisone.   She recently lost her  whose health declined rather quickly and now she is dealing with daughter who has been bedridden. She started having symptoms of falling 6 months ago and the past 3 months she hasn't been able to move. She has been in the hospital trying to figure out what is going on and there has been no answers.   Ms. Arteaga is her sole care giver at this time.     Can't sleep she can't think and is overwhelmed with everything to take care of with grieving and taking care of her daughter. She has had a lot on her with the  and the legal paperwork with the passing of her  who was also my patient. They had the one daughter so although she has some help she doesn't have a lot of help.     Review of Systems   Constitutional: Positive for activity change and fatigue. Negative for fever.   HENT: Negative.    Respiratory: Negative.    Endocrine: Negative.    Genitourinary: Negative.    Musculoskeletal: Negative.    Skin: Positive for rash.   Psychiatric/Behavioral: Positive for agitation, decreased concentration and sleep disturbance. Negative for suicidal ideas. The patient is nervous/anxious.            Past Medical History:   Diagnosis Date    Anxiety     Arthritis     Depression     Heart murmur     Hyperlipidemia     Scoliosis     Tachycardia      Objective:        Physical Exam   Constitutional: She is oriented to person, place, and time. She appears well-developed and well-nourished.   HENT:   Head: Normocephalic and atraumatic.   Cardiovascular: Normal heart sounds.    Pulmonary/Chest: Effort normal and breath sounds  normal.   Neurological: She is alert and oriented to person, place, and time.   Psychiatric: Thought content normal.   Tearful   Vitals reviewed.        Assessment/Plan:   Tinea pedis of left foot  -     fluconazole (DIFLUCAN) 200 MG Tab; Take 1 tablet (200 mg total) by mouth once daily.  Dispense: 15 tablet; Refill: 1  Restart antifungal cream    Anxiety  -     alprazolam (XANAX) 0.5 MG tablet; Take 1 tablet (0.5 mg total) by mouth 3 (three) times daily.  Dispense: 90 tablet; Refill: 1  Discontinued Klonopin at night and replaced it with Xanax. She has needed it 2 times a day during the day.   Explained that I did not want her taking it 3 times a day for a long time and that the plan should be to come down to 2 times a day and then back to one a night.     Insomnia, unspecified type  -     alprazolam (XANAX) 0.5 MG tablet; Take 1 tablet (0.5 mg total) by mouth 3 (three) times daily.  Dispense: 90 tablet; Refill: 1  Discontinued Klonopin at night and replaced it with Xanax. She has needed it 2 times a day during the day.   Explained that I did not want her taking it 3 times a day for a long time and that the plan should be to come down to 2 times a day and then back to one a night.     Return in about 4 weeks (around 8/16/2017).    Odessa Julio MD  [facility]   Family Medicine

## 2017-07-24 ENCOUNTER — TELEPHONE (OUTPATIENT)
Dept: INTERNAL MEDICINE | Facility: CLINIC | Age: 72
End: 2017-07-24

## 2017-07-24 DIAGNOSIS — Z12.31 SCREENING MAMMOGRAM, ENCOUNTER FOR: Primary | ICD-10-CM

## 2017-07-24 NOTE — TELEPHONE ENCOUNTER
----- Message from Elba Casas sent at 7/24/2017  9:11 AM CDT -----  Contact: pt  She's requesting an appointment for her mammogram, please add orders, please advise  [phone]

## 2017-07-25 ENCOUNTER — TELEPHONE (OUTPATIENT)
Dept: INTERNAL MEDICINE | Facility: CLINIC | Age: 72
End: 2017-07-25

## 2017-07-25 NOTE — TELEPHONE ENCOUNTER
Wrong number listed, [phone].  Scheduled mammogram for 08/29/17 after her f/u appt with Dr Julio. Mailed appt slip.

## 2017-07-25 NOTE — TELEPHONE ENCOUNTER
----- Message from Rossy Pete sent at 7/25/2017  9:39 AM CDT -----  Contact: pt   States she missed a call and is calling to see if it were 's office that called for her mammo and can be reached at [phone] or cell [phone]//thanks/dbw

## 2017-07-26 ENCOUNTER — HOSPITAL ENCOUNTER (OUTPATIENT)
Dept: RADIOLOGY | Facility: HOSPITAL | Age: 72
Discharge: HOME | End: 2017-07-26
Attending: FAMILY MEDICINE
Payer: MEDICARE

## 2017-07-26 VITALS — BODY MASS INDEX: 22.44 KG/M2 | WEIGHT: 143 LBS | HEIGHT: 67 IN

## 2017-07-26 DIAGNOSIS — N63.0 LUMP OR MASS IN BREAST: ICD-10-CM

## 2017-07-26 DIAGNOSIS — Z12.31 SCREENING MAMMOGRAM, ENCOUNTER FOR: ICD-10-CM

## 2017-07-26 PROCEDURE — 76642 ULTRASOUND BREAST LIMITED: CPT | Mod: 26,LT,, | Performed by: RADIOLOGY

## 2017-07-26 PROCEDURE — 77066 DX MAMMO INCL CAD BI: CPT | Mod: 26,,, | Performed by: RADIOLOGY

## 2017-07-26 PROCEDURE — 77062 BREAST TOMOSYNTHESIS BI: CPT | Mod: TC,PO

## 2017-07-26 PROCEDURE — 76642 ULTRASOUND BREAST LIMITED: CPT | Mod: TC,PO,LT

## 2017-07-26 PROCEDURE — 77062 BREAST TOMOSYNTHESIS BI: CPT | Mod: 26,,, | Performed by: RADIOLOGY

## 2017-07-28 ENCOUNTER — TELEPHONE (OUTPATIENT)
Dept: INTERNAL MEDICINE | Facility: CLINIC | Age: 72
End: 2017-07-28

## 2017-08-04 ENCOUNTER — TELEPHONE (OUTPATIENT)
Dept: INTERNAL MEDICINE | Facility: CLINIC | Age: 72
End: 2017-08-04

## 2017-08-04 NOTE — TELEPHONE ENCOUNTER
----- Message from Nia Morgan sent at 8/4/2017  9:38 AM CDT -----  Patient states that August 30 is okay for her appointment.  She will be there.   Call her at [phone].                                                         avilez

## 2017-09-01 ENCOUNTER — OFFICE VISIT (OUTPATIENT)
Dept: INTERNAL MEDICINE | Facility: CLINIC | Age: 72
End: 2017-09-01
Payer: MEDICARE

## 2017-09-01 VITALS
BODY MASS INDEX: 21.9 KG/M2 | DIASTOLIC BLOOD PRESSURE: 68 MMHG | HEIGHT: 67 IN | SYSTOLIC BLOOD PRESSURE: 100 MMHG | WEIGHT: 139.56 LBS | TEMPERATURE: 98 F | HEART RATE: 74 BPM

## 2017-09-01 DIAGNOSIS — I50.9 CONGESTIVE HEART FAILURE, UNSPECIFIED CONGESTIVE HEART FAILURE CHRONICITY, UNSPECIFIED CONGESTIVE HEART FAILURE TYPE: ICD-10-CM

## 2017-09-01 DIAGNOSIS — E78.1 HYPERTRIGLYCERIDEMIA: ICD-10-CM

## 2017-09-01 DIAGNOSIS — M47.817 LUMBOSACRAL SPONDYLOSIS WITHOUT MYELOPATHY: ICD-10-CM

## 2017-09-01 DIAGNOSIS — G47.00 INSOMNIA, UNSPECIFIED TYPE: ICD-10-CM

## 2017-09-01 DIAGNOSIS — M16.12 ARTHRITIS OF LEFT HIP: ICD-10-CM

## 2017-09-01 DIAGNOSIS — M16.11 ARTHRITIS OF RIGHT HIP: ICD-10-CM

## 2017-09-01 DIAGNOSIS — K21.9 GASTROESOPHAGEAL REFLUX DISEASE WITHOUT ESOPHAGITIS: ICD-10-CM

## 2017-09-01 DIAGNOSIS — F41.9 ANXIETY: ICD-10-CM

## 2017-09-01 PROCEDURE — 99499 UNLISTED E&M SERVICE: CPT | Mod: S$GLB,,, | Performed by: FAMILY MEDICINE

## 2017-09-01 PROCEDURE — 1159F MED LIST DOCD IN RCRD: CPT | Mod: S$GLB,,, | Performed by: FAMILY MEDICINE

## 2017-09-01 PROCEDURE — 1125F AMNT PAIN NOTED PAIN PRSNT: CPT | Mod: S$GLB,,, | Performed by: FAMILY MEDICINE

## 2017-09-01 PROCEDURE — 99214 OFFICE O/P EST MOD 30 MIN: CPT | Mod: S$GLB,,, | Performed by: FAMILY MEDICINE

## 2017-09-01 PROCEDURE — 99999 PR PBB SHADOW E&M-EST. PATIENT-LVL III: CPT | Mod: PBBFAC,,, | Performed by: FAMILY MEDICINE

## 2017-09-01 PROCEDURE — 3008F BODY MASS INDEX DOCD: CPT | Mod: S$GLB,,, | Performed by: FAMILY MEDICINE

## 2017-09-01 RX ORDER — METOPROLOL SUCCINATE 25 MG/1
25 TABLET, EXTENDED RELEASE ORAL DAILY
Qty: 90 TABLET | Refills: 3 | Status: SHIPPED | OUTPATIENT
Start: 2017-09-01 | End: 2017-11-14 | Stop reason: SDUPTHER

## 2017-09-01 RX ORDER — PANTOPRAZOLE SODIUM 40 MG/1
40 TABLET, DELAYED RELEASE ORAL DAILY
Qty: 30 TABLET | Refills: 6 | Status: SHIPPED | OUTPATIENT
Start: 2017-09-01 | End: 2018-06-07 | Stop reason: SDUPTHER

## 2017-09-01 RX ORDER — MELOXICAM 15 MG/1
15 TABLET ORAL DAILY
Qty: 30 TABLET | Refills: 2 | Status: SHIPPED | OUTPATIENT
Start: 2017-09-01 | End: 2018-01-12 | Stop reason: SDUPTHER

## 2017-09-01 RX ORDER — ALPRAZOLAM 0.5 MG/1
0.5 TABLET ORAL 3 TIMES DAILY
Qty: 90 TABLET | Refills: 2 | Status: SHIPPED | OUTPATIENT
Start: 2017-09-01 | End: 2018-01-12 | Stop reason: SDUPTHER

## 2017-09-01 RX ORDER — FENOFIBRATE 54 MG/1
54 TABLET ORAL DAILY
Qty: 30 TABLET | Refills: 11 | Status: SHIPPED | OUTPATIENT
Start: 2017-09-01 | End: 2018-09-11 | Stop reason: SDUPTHER

## 2017-09-01 NOTE — PROGRESS NOTES
Subjective:       Patient ID: Renee Arteaga is a 72 y.o. female.    Chief Complaint: Medication Refill (Meloxicam/Xanax/Protonix/Metoprolol)    HPI Ms. Arteaga presents for follow up and medication refill. She reports that she has been taking the Xanax 2-3 times a day still. She reports that her daughter is still not doing well and no one knows what is wrong. She is the sole care giver since her  passed.   She also request refills on Mobic, protonix and metoprolol.     She has no complaint other than her ongoing complaint previously mentioned.     Review of Systems    Constitutional: Positive for fatigue. Negative for fever.   HENT: Negative.    Respiratory: Negative.    Endocrine: Negative.    Genitourinary: Negative.    Musculoskeletal: Negative.    Psychiatric/Behavioral: Positive for agitation, decreased concentration and sleep disturbance. Negative for suicidal ideas. The patient is nervous/anxious.      Past Medical History:   Diagnosis Date    Anxiety     Arthritis     Depression     Heart murmur     Hyperlipidemia     Scoliosis     Tachycardia      Objective:        Physical Exam   Constitutional: She appears well-developed and well-nourished.   HENT:   Head: Normocephalic and atraumatic.   Cardiovascular: Normal heart sounds.    Pulmonary/Chest: Breath sounds normal.   Skin: Skin is warm. No rash noted.   Psychiatric: She has a normal mood and affect. Her behavior is normal.   Vitals reviewed.        Assessment/Plan:   Anxiety  -     alprazolam (XANAX) 0.5 MG tablet; Take 1 tablet (0.5 mg total) by mouth 3 (three) times daily.  Dispense: 90 tablet; Refill: 2    Insomnia, unspecified type  -     alprazolam (XANAX) 0.5 MG tablet; Take 1 tablet (0.5 mg total) by mouth 3 (three) times daily.  Dispense: 90 tablet; Refill: 2    Arthritis of left hip  -     meloxicam (MOBIC) 15 MG tablet; Take 1 tablet (15 mg total) by mouth once daily.  Dispense: 30 tablet; Refill: 2    Arthritis of right hip  -      meloxicam (MOBIC) 15 MG tablet; Take 1 tablet (15 mg total) by mouth once daily.  Dispense: 30 tablet; Refill: 2    Lumbosacral spondylosis without myelopathy  -     meloxicam (MOBIC) 15 MG tablet; Take 1 tablet (15 mg total) by mouth once daily.  Dispense: 30 tablet; Refill: 2    Gastroesophageal reflux disease without esophagitis  -     pantoprazole (PROTONIX) 40 MG tablet; Take 1 tablet (40 mg total) by mouth once daily.  Dispense: 30 tablet; Refill: 6    Congestive heart failure, unspecified congestive heart failure chronicity, unspecified congestive heart failure type  -     metoprolol succinate (TOPROL-XL) 25 MG 24 hr tablet; Take 1 tablet (25 mg total) by mouth once daily.  Dispense: 90 tablet; Refill: 3    Hypertriglyceridemia  -     fenofibrate (TRICOR) 54 MG tablet; Take 1 tablet (54 mg total) by mouth once daily.  Dispense: 30 tablet; Refill: 11  Refilled medication today. Refilled Xanax through my maternity leave.     Return in about 3 months (around 12/1/2017), or if symptoms worsen or fail to improve.    Odessa Julio MD  [facility]   Family Medicine

## 2017-11-14 ENCOUNTER — OFFICE VISIT (OUTPATIENT)
Dept: CARDIOLOGY | Facility: CLINIC | Age: 72
End: 2017-11-14
Payer: MEDICARE

## 2017-11-14 VITALS
HEIGHT: 67 IN | SYSTOLIC BLOOD PRESSURE: 120 MMHG | BODY MASS INDEX: 22.49 KG/M2 | WEIGHT: 143.31 LBS | HEART RATE: 66 BPM | DIASTOLIC BLOOD PRESSURE: 60 MMHG

## 2017-11-14 DIAGNOSIS — R00.2 HEART PALPITATIONS: ICD-10-CM

## 2017-11-14 DIAGNOSIS — E78.5 DYSLIPIDEMIA: Primary | Chronic | ICD-10-CM

## 2017-11-14 PROCEDURE — 99999 PR PBB SHADOW E&M-EST. PATIENT-LVL III: CPT | Mod: PBBFAC,,, | Performed by: NUCLEAR MEDICINE

## 2017-11-14 PROCEDURE — 99214 OFFICE O/P EST MOD 30 MIN: CPT | Mod: S$GLB,,, | Performed by: NUCLEAR MEDICINE

## 2017-11-14 RX ORDER — METOPROLOL SUCCINATE 25 MG/1
25 TABLET, EXTENDED RELEASE ORAL DAILY
Qty: 90 TABLET | Refills: 3 | Status: CANCELLED | OUTPATIENT
Start: 2017-11-14

## 2017-11-14 RX ORDER — METOPROLOL SUCCINATE 25 MG/1
25 TABLET, EXTENDED RELEASE ORAL DAILY
Qty: 90 TABLET | Refills: 3 | Status: SHIPPED | OUTPATIENT
Start: 2017-11-14 | End: 2018-01-12 | Stop reason: SDUPTHER

## 2017-11-14 NOTE — PROGRESS NOTES
Subjective:   Patient ID:  Renee Arteaga is a 72 y.o. female who presents for follow-up of Hyperlipidemia and Palpitations      HPI DYSLIPIDEMIA  2-  PALPITATIONS  DOING WELL. NO RECENT HOSPITALIZATIONS OR ED VISIT FOR ACS OR ADHF. OR ARRHYTHMIAS  NO RECURRENT PALPITATIONS  NO NEAR SYNCOPE OR SYNCOPE  NO HX OF CHEST PAIN- NO UNUSUAL VALENZUELA. NO ORTHOPNEA OR PND  NO EDEMA.  NO INTERMITTENT CLAUDICATION  NO FOCAL CNS SYMPTOMS OR SIGNS TO SUGGEST TIA OR STROKE  CARD MED WELL TOLERATED    Review of Systems   Constitution: Negative for chills, fever, weakness, night sweats, weight gain and weight loss.   HENT: Negative for nosebleeds.    Eyes: Negative for blurred vision, double vision and visual disturbance.   Cardiovascular: Negative for chest pain, dyspnea on exertion, irregular heartbeat, leg swelling, orthopnea, palpitations, paroxysmal nocturnal dyspnea and syncope.   Respiratory: Negative for cough, hemoptysis and wheezing.    Endocrine: Negative for polydipsia and polyuria.   Hematologic/Lymphatic: Does not bruise/bleed easily.   Skin: Negative for rash.   Musculoskeletal: Negative for joint pain, joint swelling, muscle weakness and myalgias.   Gastrointestinal: Negative for abdominal pain, hematemesis, jaundice and melena.   Genitourinary: Negative for dysuria, hematuria and nocturia.   Neurological: Negative for dizziness, focal weakness, headaches and sensory change.   Psychiatric/Behavioral: Negative for depression. The patient does not have insomnia and is not nervous/anxious.      Family History   Problem Relation Age of Onset    Arthritis Mother     Lung disease Mother     Heart disease Father      Past Medical History:   Diagnosis Date    Anxiety     Arthritis     Depression     Heart murmur     Hyperlipidemia     Scoliosis     Tachycardia      Current Outpatient Prescriptions on File Prior to Visit   Medication Sig Dispense Refill    alprazolam (XANAX) 0.5 MG tablet Take 1 tablet (0.5 mg total) by  mouth 3 (three) times daily. 90 tablet 2    aspirin (ECOTRIN) 81 MG EC tablet Take 1 tablet by mouth Daily. 1 Tablet, Delayed Release (E.C.) Oral Every day      fenofibrate (TRICOR) 54 MG tablet Take 1 tablet (54 mg total) by mouth once daily. 30 tablet 11    meloxicam (MOBIC) 15 MG tablet Take 1 tablet (15 mg total) by mouth once daily. 30 tablet 2    pantoprazole (PROTONIX) 40 MG tablet Take 1 tablet (40 mg total) by mouth once daily. 30 tablet 6    pravastatin (PRAVACHOL) 40 MG tablet Take 1 tablet (40 mg total) by mouth once daily. 1 Tablet Oral Every day 90 tablet 3    [DISCONTINUED] metoprolol succinate (TOPROL-XL) 25 MG 24 hr tablet Take 1 tablet (25 mg total) by mouth once daily. 90 tablet 3     No current facility-administered medications on file prior to visit.      Review of patient's allergies indicates:  No Known Allergies    Objective:     Physical Exam   Constitutional: She is oriented to person, place, and time. She appears well-developed. No distress.   HENT:   Head: Normocephalic.   Eyes: Conjunctivae are normal. Pupils are equal, round, and reactive to light. No scleral icterus.   Neck: Normal range of motion. Neck supple. Normal carotid pulses, no hepatojugular reflux and no JVD present. Carotid bruit is not present. No edema present. No thyroid mass and no thyromegaly present.   Cardiovascular: Normal rate, regular rhythm, S1 normal, S2 normal, normal heart sounds and intact distal pulses.  PMI is not displaced.  Exam reveals no gallop and no friction rub.    No murmur heard.  Pulses:       Carotid pulses are 2+ on the right side, and 2+ on the left side.       Radial pulses are 2+ on the right side, and 2+ on the left side.        Femoral pulses are 2+ on the right side, and 2+ on the left side.       Popliteal pulses are 2+ on the right side, and 2+ on the left side.        Dorsalis pedis pulses are 2+ on the right side, and 2+ on the left side.        Posterior tibial pulses are 2+ on  the right side, and 2+ on the left side.   Pulmonary/Chest: Effort normal and breath sounds normal. She has no wheezes. She has no rales. She exhibits no tenderness.   Abdominal: Soft. Bowel sounds are normal. She exhibits no pulsatile midline mass and no mass. There is no hepatosplenomegaly. There is no tenderness.   Musculoskeletal: Normal range of motion. She exhibits no edema or tenderness.        Cervical back: Normal.        Thoracic back: Normal.        Lumbar back: Normal.   Lymphadenopathy:     She has no cervical adenopathy.     She has no axillary adenopathy.        Right: No supraclavicular adenopathy present.        Left: No supraclavicular adenopathy present.   Neurological: She is alert and oriented to person, place, and time. She has normal strength and normal reflexes. No sensory deficit. Gait normal.   Skin: Skin is warm. No rash noted. No cyanosis. No pallor. Nails show no clubbing.   Psychiatric: She has a normal mood and affect. Her speech is normal and behavior is normal. Cognition and memory are normal.       Assessment:     1. Dyslipidemia    2. Heart palpitations      STABLE CV STATUS  STABLE CNS STATUS  BP STABLE  CARD MED WELL TOLERATED  Plan:     Dyslipidemia    Heart palpitations  -     metoprolol succinate (TOPROL-XL) 25 MG 24 hr tablet; Take 1 tablet (25 mg total) by mouth once daily.  Dispense: 90 tablet; Refill: 3    Other orders  -     Cancel: metoprolol succinate (TOPROL-XL) 25 MG 24 hr tablet; Take 1 tablet (25 mg total) by mouth once daily.  Dispense: 90 tablet; Refill: 3      1- CONTINUE PRESENT CARD MANAGEMENT    2- RETURN IN ONE YEAR

## 2017-12-14 ENCOUNTER — PATIENT OUTREACH (OUTPATIENT)
Dept: ADMINISTRATIVE | Facility: HOSPITAL | Age: 72
End: 2017-12-14

## 2017-12-14 DIAGNOSIS — M85.80 OSTEOPENIA, UNSPECIFIED LOCATION: ICD-10-CM

## 2017-12-14 DIAGNOSIS — M89.9 BONE DISORDER: Primary | ICD-10-CM

## 2018-01-12 ENCOUNTER — OFFICE VISIT (OUTPATIENT)
Dept: INTERNAL MEDICINE | Facility: CLINIC | Age: 73
End: 2018-01-12
Payer: MEDICARE

## 2018-01-12 VITALS
BODY MASS INDEX: 22.32 KG/M2 | DIASTOLIC BLOOD PRESSURE: 62 MMHG | TEMPERATURE: 96 F | SYSTOLIC BLOOD PRESSURE: 100 MMHG | HEART RATE: 66 BPM | HEIGHT: 67 IN | WEIGHT: 142.19 LBS

## 2018-01-12 DIAGNOSIS — M16.12 ARTHRITIS OF LEFT HIP: ICD-10-CM

## 2018-01-12 DIAGNOSIS — M47.817 LUMBOSACRAL SPONDYLOSIS WITHOUT MYELOPATHY: ICD-10-CM

## 2018-01-12 DIAGNOSIS — Z23 IMMUNIZATION DUE: ICD-10-CM

## 2018-01-12 DIAGNOSIS — G47.00 INSOMNIA, UNSPECIFIED TYPE: ICD-10-CM

## 2018-01-12 DIAGNOSIS — F41.9 ANXIETY: Primary | ICD-10-CM

## 2018-01-12 DIAGNOSIS — M16.11 ARTHRITIS OF RIGHT HIP: ICD-10-CM

## 2018-01-12 DIAGNOSIS — R00.2 HEART PALPITATIONS: ICD-10-CM

## 2018-01-12 PROCEDURE — 99214 OFFICE O/P EST MOD 30 MIN: CPT | Mod: S$GLB,,, | Performed by: FAMILY MEDICINE

## 2018-01-12 PROCEDURE — G0008 ADMIN INFLUENZA VIRUS VAC: HCPCS | Mod: S$GLB,,, | Performed by: FAMILY MEDICINE

## 2018-01-12 PROCEDURE — 90670 PCV13 VACCINE IM: CPT | Mod: S$GLB,,, | Performed by: FAMILY MEDICINE

## 2018-01-12 PROCEDURE — 99999 PR PBB SHADOW E&M-EST. PATIENT-LVL III: CPT | Mod: PBBFAC,,, | Performed by: FAMILY MEDICINE

## 2018-01-12 PROCEDURE — 90662 IIV NO PRSV INCREASED AG IM: CPT | Mod: S$GLB,,, | Performed by: FAMILY MEDICINE

## 2018-01-12 PROCEDURE — G0009 ADMIN PNEUMOCOCCAL VACCINE: HCPCS | Mod: S$GLB,,, | Performed by: FAMILY MEDICINE

## 2018-01-12 RX ORDER — METOPROLOL SUCCINATE 25 MG/1
25 TABLET, EXTENDED RELEASE ORAL DAILY
Qty: 90 TABLET | Refills: 3 | Status: SHIPPED | OUTPATIENT
Start: 2018-01-12 | End: 2019-01-10 | Stop reason: SDUPTHER

## 2018-01-12 RX ORDER — MELOXICAM 15 MG/1
15 TABLET ORAL DAILY
Qty: 90 TABLET | Refills: 3 | Status: SHIPPED | OUTPATIENT
Start: 2018-01-12 | End: 2019-01-10 | Stop reason: SDUPTHER

## 2018-01-12 RX ORDER — ALPRAZOLAM 0.5 MG/1
0.5 TABLET ORAL 3 TIMES DAILY
Qty: 90 TABLET | Refills: 2 | Status: SHIPPED | OUTPATIENT
Start: 2018-01-12 | End: 2018-04-19 | Stop reason: SDUPTHER

## 2018-01-12 NOTE — PROGRESS NOTES
Subjective:       Patient ID: Renee Arteaga is a 72 y.o. female.    Chief Complaint: Follow-up and Medication Refill    HPI Ms. Arteaga presents for follow up and medication refill.   Her daughter is doing better. She is taking steps with the walker and she is sitting up now.   She had been taking care of daughter prior to and since the passing of her .   She reports during the time her  was ill they had a lot of people in and out of the home but during the holidays that slowed down a lot giving she and her daughter a lot more time to think about her . They both miss him tremendously.     Review of Systems   Constitutional: Positive for appetite change. Negative for activity change.   Musculoskeletal: Negative for arthralgias, back pain and gait problem.   Psychiatric/Behavioral: Positive for decreased concentration and dysphoric mood. Negative for sleep disturbance and suicidal ideas.           Past Medical History:   Diagnosis Date    Anxiety     Arthritis     Depression     Heart murmur     Hyperlipidemia     Scoliosis     Tachycardia      Past Surgical History:   Procedure Laterality Date    BACK SURGERY      bladder/rectum lift      HYSTERECTOMY       Family History   Problem Relation Age of Onset    Arthritis Mother     Lung disease Mother     Heart disease Father      Social History     Social History    Marital status:      Spouse name: N/A    Number of children: N/A    Years of education: N/A     Social History Main Topics    Smoking status: Never Smoker    Smokeless tobacco: Never Used    Alcohol use No    Drug use: No    Sexual activity: Not Asked     Other Topics Concern    None     Social History Narrative    None       Objective:        Physical Exam   Constitutional: She is oriented to person, place, and time. She appears well-developed and well-nourished.   HENT:   Head: Normocephalic and atraumatic.   Cardiovascular: Normal heart sounds.     Pulmonary/Chest: Breath sounds normal.   Neurological: She is alert and oriented to person, place, and time.   Skin: Skin is warm. No rash noted.   Psychiatric: She has a normal mood and affect. Her behavior is normal.   Teary when talking about her     Vitals reviewed.        Assessment/Plan:     Anxiety  -     ALPRAZolam (XANAX) 0.5 MG tablet; Take 1 tablet (0.5 mg total) by mouth 3 (three) times daily.  Dispense: 90 tablet; Refill: 2    Heart palpitations  -     metoprolol succinate (TOPROL-XL) 25 MG 24 hr tablet; Take 1 tablet (25 mg total) by mouth once daily.  Dispense: 90 tablet; Refill: 3    Insomnia, unspecified type  -     ALPRAZolam (XANAX) 0.5 MG tablet; Take 1 tablet (0.5 mg total) by mouth 3 (three) times daily.  Dispense: 90 tablet; Refill: 2    Arthritis of left hip  -     meloxicam (MOBIC) 15 MG tablet; Take 1 tablet (15 mg total) by mouth once daily.  Dispense: 90 tablet; Refill: 3    Arthritis of right hip  -     meloxicam (MOBIC) 15 MG tablet; Take 1 tablet (15 mg total) by mouth once daily.  Dispense: 90 tablet; Refill: 3    Lumbosacral spondylosis without myelopathy  -     meloxicam (MOBIC) 15 MG tablet; Take 1 tablet (15 mg total) by mouth once daily.  Dispense: 90 tablet; Refill: 3    Immunization due  -     (In Office Administered) Pneumococcal Conjugate Vaccine (13 Valent) (IM)    Other orders  -     Influenza - High Dose (65+) (PF) (IM)          Follow-up in about 3 months (around 4/12/2018), or if symptoms worsen or fail to improve.    Odessa Julio MD  [facility]   Family Medicine

## 2018-02-07 ENCOUNTER — PATIENT OUTREACH (OUTPATIENT)
Dept: ADMINISTRATIVE | Facility: HOSPITAL | Age: 73
End: 2018-02-07

## 2018-02-22 ENCOUNTER — OFFICE VISIT (OUTPATIENT)
Dept: INTERNAL MEDICINE | Facility: CLINIC | Age: 73
End: 2018-02-22
Payer: MEDICARE

## 2018-02-22 VITALS
SYSTOLIC BLOOD PRESSURE: 110 MMHG | BODY MASS INDEX: 22.36 KG/M2 | HEART RATE: 76 BPM | OXYGEN SATURATION: 99 % | WEIGHT: 142.44 LBS | TEMPERATURE: 97 F | DIASTOLIC BLOOD PRESSURE: 60 MMHG | HEIGHT: 67 IN

## 2018-02-22 DIAGNOSIS — N63.20 MASS OF LEFT BREAST: ICD-10-CM

## 2018-02-22 DIAGNOSIS — R21 RASH: Primary | ICD-10-CM

## 2018-02-22 DIAGNOSIS — D22.9 NEVUS: ICD-10-CM

## 2018-02-22 PROCEDURE — 1126F AMNT PAIN NOTED NONE PRSNT: CPT | Mod: S$GLB,,, | Performed by: FAMILY MEDICINE

## 2018-02-22 PROCEDURE — 99213 OFFICE O/P EST LOW 20 MIN: CPT | Mod: S$GLB,,, | Performed by: FAMILY MEDICINE

## 2018-02-22 PROCEDURE — 99999 PR PBB SHADOW E&M-EST. PATIENT-LVL IV: CPT | Mod: PBBFAC,,, | Performed by: FAMILY MEDICINE

## 2018-02-22 PROCEDURE — 3008F BODY MASS INDEX DOCD: CPT | Mod: S$GLB,,, | Performed by: FAMILY MEDICINE

## 2018-02-22 PROCEDURE — 1159F MED LIST DOCD IN RCRD: CPT | Mod: S$GLB,,, | Performed by: FAMILY MEDICINE

## 2018-02-22 RX ORDER — TRIAMCINOLONE ACETONIDE 1 MG/G
CREAM TOPICAL 2 TIMES DAILY
Qty: 15 G | Refills: 1 | Status: SHIPPED | OUTPATIENT
Start: 2018-02-22 | End: 2018-04-12

## 2018-02-22 NOTE — PROGRESS NOTES
Subjective:       Patient ID: Renee Arteaga is a 72 y.o. female.    Chief Complaint: Breast Mass (spot on back)    HPI Ms. Arteaga presents today with breast mass and spot on her back.  Knot under left breast. She noticed this months ago but it was not as big as it is now. (she actually was seen for this 7/2017)  Mammogram was negative.   Non tender.   It has been hard to find sometimes because it moves.     She also has felt two spots on her back that she would like to have looked at. Not sure if it is a mole or what but it itches sometimes.   She has a rash that she feels on her lower back right above the bottom that comes and goes. This also itches. She usually uses neosporin on it.     Review of Systems   Constitutional: Negative.    HENT: Negative.    Genitourinary: Negative.    Skin: Positive for rash.   Neurological: Negative for dizziness and headaches.   Psychiatric/Behavioral: Positive for agitation and decreased concentration. The patient is nervous/anxious.          Past Medical History:   Diagnosis Date    Anxiety     Arthritis     Depression     Heart murmur     Hyperlipidemia     Scoliosis     Tachycardia      Objective:        Physical Exam   Constitutional: She appears well-developed and well-nourished.   HENT:   Head: Normocephalic and atraumatic.   Pulmonary/Chest:       Skin:   3 moles noted slightly raised-see picture attached     Irregular boarders    Vitals reviewed.        Assessment/Plan:     Rash  -     triamcinolone acetonide 0.1% (KENALOG) 0.1 % cream; Apply topically 2 (two) times daily.  Dispense: 15 g; Refill: 1    Mass of left breast  -     US Soft Tissue Misc; Future; Expected date: 02/22/2018  -     Mammo Digital Diagnostic Left with CAD; Future; Expected date: 02/22/2018  -     US Breast Left Limited; Future; Expected date: 02/22/2018    [place]    She has had a mammogram and ultrasound in 7/2017 for the same symptoms. Would prefer to repeat in July but will schedule sooner if  patient would like.     Will monitor skin for changes of moles noted today.     Follow-up in about 3 months (around 5/22/2018), or if symptoms worsen or fail to improve.    Odessa Julio MD  [facility]

## 2018-02-26 ENCOUNTER — TELEPHONE (OUTPATIENT)
Dept: INTERNAL MEDICINE | Facility: CLINIC | Age: 73
End: 2018-02-26

## 2018-02-26 NOTE — TELEPHONE ENCOUNTER
----- Message from India Perkins sent at 2/26/2018  1:44 PM CST -----  Contact: self   Patient was told she would receive a call back if she needed to schedule US based on previous US.Please call back at [phone].      Thanks,  India Perkins

## 2018-02-26 NOTE — TELEPHONE ENCOUNTER
Spoke to pt and tried to schedule her diagnostic mammo and US. Will get with imaging dept to see if order is correct and can it be done at [facility].

## 2018-02-26 NOTE — TELEPHONE ENCOUNTER
The message may not have gone through. I ordered both ultrasound and diagnostic mammogram. This is the same complaint she had before based on exam I think it is fibrous tissue. She can wait to have this repeated in June/July so that it is a year after her last. If she wants to have it repeated now that is fine.

## 2018-03-01 ENCOUNTER — TELEPHONE (OUTPATIENT)
Dept: INTERNAL MEDICINE | Facility: CLINIC | Age: 73
End: 2018-03-01

## 2018-03-01 ENCOUNTER — OFFICE VISIT (OUTPATIENT)
Dept: INTERNAL MEDICINE | Facility: CLINIC | Age: 73
End: 2018-03-01
Payer: MEDICARE

## 2018-03-01 VITALS
DIASTOLIC BLOOD PRESSURE: 68 MMHG | SYSTOLIC BLOOD PRESSURE: 122 MMHG | TEMPERATURE: 97 F | BODY MASS INDEX: 22.43 KG/M2 | HEIGHT: 67 IN | WEIGHT: 142.88 LBS | HEART RATE: 64 BPM | OXYGEN SATURATION: 99 %

## 2018-03-01 DIAGNOSIS — H92.01 OTALGIA, RIGHT EAR: Primary | ICD-10-CM

## 2018-03-01 DIAGNOSIS — R23.0 BLUE TOES: ICD-10-CM

## 2018-03-01 DIAGNOSIS — M81.0 OSTEOPOROSIS, UNSPECIFIED OSTEOPOROSIS TYPE, UNSPECIFIED PATHOLOGICAL FRACTURE PRESENCE: Primary | ICD-10-CM

## 2018-03-01 PROCEDURE — 99999 PR PBB SHADOW E&M-EST. PATIENT-LVL III: CPT | Mod: PBBFAC,,, | Performed by: FAMILY MEDICINE

## 2018-03-01 PROCEDURE — 99213 OFFICE O/P EST LOW 20 MIN: CPT | Mod: S$GLB,,, | Performed by: FAMILY MEDICINE

## 2018-03-01 NOTE — PROGRESS NOTES
Subjective:       Patient ID: Renee Arteaga is a 72 y.o. female.    Chief Complaint: Otalgia (right ear); Foot Injury; and URI    HPI Ms. Arteaga presents today with multiple complaints.  This morning felt fluid in right ear got a q-tip to clean it and saw blood on the q-tip.   No itching or pain.   She is hard of hearing in that ear. She has a little bit of pain in the ear.     nasal drip which she has all the time.   Nasal drip.     Toes turn blue right foot. It being sore made her look at them.   They are either very hot or cold. Toes are sore/tender to touch. She has noticed this for the last couple weeks.   Most of the time she wears open toe slippers around the house.     In the past her fingers would turn white and got numb.     Review of Systems    See HPI for pertinent Positive ROS     Objective:      Physical Exam   Constitutional: She is oriented to person, place, and time. She appears well-developed and well-nourished.   HENT:   Head: Normocephalic and atraumatic.   Right Ear: External ear normal.   Left Ear: External ear normal.   There is a scratch on the anterior aspect of the ear canal about 4 -6 mm in length. Blood is noted but dry  Tender to touch with speculum    Cardiovascular: Normal heart sounds.    Pulses:       Dorsalis pedis pulses are 2+ on the right side, and 2+ on the left side.        Posterior tibial pulses are 2+ on the right side, and 2+ on the left side.   Musculoskeletal: Normal range of motion.        Feet:    Neurological: She is alert and oriented to person, place, and time.   Vitals reviewed.        Assessment/Plan:   Otalgia, right ear  Scratch noted in ear. Patient says she must have done this the day before when cleaning water from ear. Motrin if needed. Monitor for next few days inform MD If symptoms are not better next week.     Blue toes  Exam reassuring. With history of fingers turning white and numb with resolution after warming them up. Will wear socks when cool in the  house. Continue to monitor         No Follow-up on file.    Odessa Julio MD  [facility]

## 2018-03-20 ENCOUNTER — TELEPHONE (OUTPATIENT)
Dept: INTERNAL MEDICINE | Facility: CLINIC | Age: 73
End: 2018-03-20

## 2018-03-20 NOTE — TELEPHONE ENCOUNTER
----- Message from Alicia Billy sent at 3/20/2018  7:08 AM CDT -----  Contact: pt  Please call pt @ [phone] regarding appt for podiatry, pt states she need to discuss about toe is darker/swollen/pain, pt need an earlier appt.

## 2018-03-21 ENCOUNTER — HOSPITAL ENCOUNTER (OUTPATIENT)
Dept: RADIOLOGY | Facility: HOSPITAL | Age: 73
Discharge: HOME | End: 2018-03-21
Attending: FAMILY MEDICINE
Payer: MEDICARE

## 2018-03-21 DIAGNOSIS — N63.20 MASS OF LEFT BREAST: ICD-10-CM

## 2018-03-21 PROCEDURE — 76642 ULTRASOUND BREAST LIMITED: CPT | Mod: TC,LT

## 2018-03-21 PROCEDURE — 77065 DX MAMMO INCL CAD UNI: CPT | Mod: 26,LT,ICN, | Performed by: RADIOLOGY

## 2018-03-21 PROCEDURE — 76642 ULTRASOUND BREAST LIMITED: CPT | Mod: 26,LT,, | Performed by: RADIOLOGY

## 2018-03-21 PROCEDURE — 77065 DX MAMMO INCL CAD UNI: CPT | Mod: TC,LT

## 2018-03-21 PROCEDURE — 77061 BREAST TOMOSYNTHESIS UNI: CPT | Mod: 26,LT,ICN, | Performed by: RADIOLOGY

## 2018-03-21 PROCEDURE — 77061 BREAST TOMOSYNTHESIS UNI: CPT | Mod: TC,LT

## 2018-03-22 ENCOUNTER — TELEPHONE (OUTPATIENT)
Dept: RADIOLOGY | Facility: HOSPITAL | Age: 73
End: 2018-03-22

## 2018-03-26 ENCOUNTER — OFFICE VISIT (OUTPATIENT)
Dept: SURGERY | Facility: CLINIC | Age: 73
End: 2018-03-26
Payer: MEDICARE

## 2018-03-26 VITALS
TEMPERATURE: 98 F | WEIGHT: 142 LBS | BODY MASS INDEX: 22.24 KG/M2 | DIASTOLIC BLOOD PRESSURE: 64 MMHG | SYSTOLIC BLOOD PRESSURE: 106 MMHG | HEART RATE: 69 BPM

## 2018-03-26 DIAGNOSIS — R92.8 ABNORMAL MAMMOGRAM: Primary | ICD-10-CM

## 2018-03-26 DIAGNOSIS — N63.20 BREAST MASS, LEFT: ICD-10-CM

## 2018-03-26 PROCEDURE — 99204 OFFICE O/P NEW MOD 45 MIN: CPT | Mod: S$GLB,,, | Performed by: NURSE PRACTITIONER

## 2018-03-26 PROCEDURE — 99999 PR PBB SHADOW E&M-EST. PATIENT-LVL III: CPT | Mod: PBBFAC,,, | Performed by: NURSE PRACTITIONER

## 2018-03-26 NOTE — LETTER
March 26, 2018      Odessa Julio MD  [address] [place] [address] [address] [id number]           [facility] General Surgery  [address] [place] [address] [address]  [place] [address]  Phone: [phone]  Fax: [phone]          Patient: Rneee Arteaga   MR Number: [id number]   YOB: 1945   Date of Visit: 3/26/2018       Dear Dr. Odessa Julio:    Thank you for referring Renee Arteaga to me for evaluation. Attached you will find relevant portions of my assessment and plan of care.    If you have questions, please do not hesitate to call me. I look forward to following Renee Arteaga along with you.    Sincerely,    Snow Kathleen NP    Enclosure  CC:  No Recipients    If you would like to receive this communication electronically, please contact [email or web address] or [phone] to request more information on [facility] access.    For providers and/or their staff who would like to refer a patient to [facility], please contact us through our one-stop-shop provider referral line, [facility], at [phone].    If you feel you have received this communication in error or would no longer like to receive these types of communications, please e-mail [email or web address]

## 2018-03-26 NOTE — PROGRESS NOTES
Patient ID: Renee Arteaga is a 72 y.o. female.    Chief Complaint: Consult      HPI: Patient presents for the evaluation of an abnormal mammogram and palpable left breast mass.     Menarche at 11 y/o  G 1 P 1  First pregnancy at 20 y/o  LMP: 41 y/o  Estrogen: 10 years  Radiation to the neck or chest wall- none    Prior breast biopsies or atypical hyperplasia - none     FH: none      Review of Systems   Constitutional: Negative.    HENT: Negative.    Eyes: Negative.    Respiratory: Negative.    Cardiovascular: Negative.    Gastrointestinal: Negative.         No reflux   Endocrine: Negative.    Genitourinary: Negative.    Musculoskeletal: Negative.    Skin: Negative.    Allergic/Immunologic: Negative.    Neurological: Negative.    Hematological: Negative.  Negative for adenopathy.   Psychiatric/Behavioral: Negative.        Breast: Pt denies any breast pain or nipple discharge. Pt noted a vague area of prominence May 2017 - not tender. Imaging in July was negative. Noted since that time the area has increased in size slightly and more prominent.  No prior trauma or bruising. No breast surgeries or abnormalities.    Current Outpatient Prescriptions   Medication Sig Dispense Refill    ALPRAZolam (XANAX) 0.5 MG tablet Take 1 tablet (0.5 mg total) by mouth 3 (three) times daily. 90 tablet 2    aspirin (ECOTRIN) 81 MG EC tablet Take 1 tablet by mouth Daily. 1 Tablet, Delayed Release (E.C.) Oral Every day      fenofibrate (TRICOR) 54 MG tablet Take 1 tablet (54 mg total) by mouth once daily. 30 tablet 11    meloxicam (MOBIC) 15 MG tablet Take 1 tablet (15 mg total) by mouth once daily. 90 tablet 3    metoprolol succinate (TOPROL-XL) 25 MG 24 hr tablet Take 1 tablet (25 mg total) by mouth once daily. 90 tablet 3    pantoprazole (PROTONIX) 40 MG tablet Take 1 tablet (40 mg total) by mouth once daily. 30 tablet 6    pravastatin (PRAVACHOL) 40 MG tablet Take 1 tablet (40 mg total) by mouth once daily. 1 Tablet Oral Every  day 90 tablet 3    triamcinolone acetonide 0.1% (KENALOG) 0.1 % cream Apply topically 2 (two) times daily. 15 g 1     No current facility-administered medications for this visit.        Review of patient's allergies indicates:  No Known Allergies    Past Medical History:   Diagnosis Date    Anxiety     Arthritis     Depression     Heart murmur     Hyperlipidemia     Scoliosis     Tachycardia        Past Surgical History:   Procedure Laterality Date    BACK SURGERY      bladder/rectum lift      HYSTERECTOMY         Family History   Problem Relation Age of Onset    Arthritis Mother     Lung disease Mother     Heart disease Father        Social History     Social History    Marital status:      Spouse name: N/A    Number of children: N/A    Years of education: N/A     Occupational History    Not on file.     Social History Main Topics    Smoking status: Never Smoker    Smokeless tobacco: Never Used    Alcohol use No    Drug use: No    Sexual activity: Not on file     Other Topics Concern    Not on file     Social History Narrative    No narrative on file       Vitals:    03/26/18 0934   BP: 106/64   Pulse: 69   Temp: 98.4 °F (36.9 °C)       Physical Exam   Constitutional: She is oriented to person, place, and time. She appears well-developed and well-nourished.   HENT:   Head: Normocephalic and atraumatic.   Right Ear: External ear normal.   Left Ear: External ear normal.   Mouth/Throat: No oropharyngeal exudate.   Eyes: Conjunctivae and EOM are normal. Pupils are equal, round, and reactive to light. Right eye exhibits no discharge. Left eye exhibits no discharge. No scleral icterus.   Neck: Normal range of motion. Neck supple. No thyromegaly present.   Cardiovascular: Normal rate, regular rhythm and normal heart sounds.    Pulmonary/Chest: Effort normal and breath sounds normal. Right breast exhibits no inverted nipple, no mass, no nipple discharge, no skin change and no tenderness.  Left breast exhibits mass. Left breast exhibits no inverted nipple, no nipple discharge, no skin change and no tenderness.       Abdominal: Soft. Bowel sounds are normal.   Musculoskeletal: Normal range of motion. She exhibits no edema.        Right shoulder: She exhibits no crepitus and normal strength.   Lymphadenopathy:        Head (right side): No submental, no submandibular, no tonsillar, no preauricular, no posterior auricular and no occipital adenopathy present.        Head (left side): No submental, no submandibular, no tonsillar, no preauricular, no posterior auricular and no occipital adenopathy present.     She has no cervical adenopathy.        Right cervical: No superficial cervical and no posterior cervical adenopathy present.       Left cervical: No superficial cervical and no posterior cervical adenopathy present.     She has no axillary adenopathy.        Right: No supraclavicular adenopathy present.        Left: No supraclavicular adenopathy present.   Neurological: She is alert and oriented to person, place, and time. She has normal reflexes.   Skin: Skin is warm and dry. No rash noted. No erythema. No pallor.   Psychiatric: She has a normal mood and affect. Her behavior is normal. Judgment and thought content normal.       Imaging:   Result:   Mammo Digital Diagnostic Left with Tomosynthesis_CAD  US Breast Left Limited     History:  Patient is 72 y.o. and is seen for a diagnostic mammogram.     Films Compared:  07/26/2017 Mammo Digital Diagnostic Bilat with Tomosynthesis_CAD, 07/26/2017 US Breast Left Limited, and 03/16/2016 Mammo Digital Screening Bilat with CAD     Findings:  This procedure was performed using tomosynthesis.  Computer-aided detection was utilized in the interpretation of this examination.  Mammo Digital Diagnostic Left with Tomosynthesis_CAD  The breast has scattered areas of fibroglandular density.     There is a mass seen in the lower outer quadrant of the left breast.  This  corresponds to a palpable lump.  An additional mass in the outer upper left breast is stable for over 2 years and therefore benign.     US Breast Left Limited  There is a new heterogenous solid mass seen in the left breast at 5 o'clock.  This measures  16 x 13 x 6 mm and contains solid and more cystic components. No shadowing is seen.     Impression:  Left  Mass: Left breast mass at the lower outer position. Assessment: 4 - Suspicious finding. Biopsy is recommended.      BI-RADS Category:   Left: 4 - Suspicious  Overall: 4 - Suspicious     Recommendation:  Biopsy is recommended.     The patient's estimated lifetime risk of breast cancer (to age 85) based on Tyrer-Cuzick - 7 risk assessment model is: Tyrer-Cuzick: 4.34 %. According to the American Cancer Society,  patients with a lifetime breast cancer risk of 20% or higher might benefit from supplemental screening tests.    Encounter     View Encounter              Assessment & Plan:  Abnormal mammogram  -     US Breast Biopsy with Imaging 1st site L; Future; Expected date: 03/26/2018    Breast mass, left  -     US Breast Biopsy with Imaging 1st site L; Future; Expected date: 03/26/2018    1. Palpable left breast nodule per pt- increased in size over time  2. Left breast solid mass in area of palpable abnormality at 5 oclock  3. Clinical exam reveals vague nodule at the lateral edge of the inframammary ridge.   4.  Recommend ultrasound guided biopsy of the left breast solid nodule at the 5:00 position.  Risk versus benefits of the procedure was explained to the patient which include bruising, pain, bleeding, missing the lesion, and if the pathology returned suspicious or cancer would need further surgical intervention and collaboration with oncology.  5.  Preoperative, perioperative, and postoperative instructions were given to the patient in writing and verbally.  Patient verbalized understanding.  Patient has been off her Mobic and aspirin since March 22.  The  patient was scheduled with the [facility] for April 3 at 10 AM.  Patient was given a copy of her orders and her postop instructions and preop instructions.  An appointment for follow-up for test results was given for April 9, 2018.  Patient was given our contact number to call in case she has any further questions.  We discussed if the pathology returns benign we will recommend 6 month follow-up versus excision of this nodule since it has increased in size per patient report.  If The pathology returned suspicious or cancer we will schedule a patient with surgery to discuss surgical options.

## 2018-03-27 ENCOUNTER — OFFICE VISIT (OUTPATIENT)
Dept: PODIATRY | Facility: CLINIC | Age: 73
End: 2018-03-27
Payer: MEDICARE

## 2018-03-27 ENCOUNTER — LAB VISIT (OUTPATIENT)
Dept: LAB | Facility: HOSPITAL | Age: 73
End: 2018-03-27
Attending: PODIATRIST
Payer: MEDICARE

## 2018-03-27 VITALS
WEIGHT: 141.44 LBS | SYSTOLIC BLOOD PRESSURE: 103 MMHG | DIASTOLIC BLOOD PRESSURE: 65 MMHG | BODY MASS INDEX: 22.2 KG/M2 | HEIGHT: 67 IN | HEART RATE: 65 BPM

## 2018-03-27 DIAGNOSIS — I74.3 EMBOLIC DISEASE OF TOE: Primary | ICD-10-CM

## 2018-03-27 DIAGNOSIS — I73.9 ARTERIAL INSUFFICIENCY OF LOWER EXTREMITY: ICD-10-CM

## 2018-03-27 DIAGNOSIS — I74.3 EMBOLIC DISEASE OF TOE: ICD-10-CM

## 2018-03-27 LAB
CRP SERPL-MCNC: 1.2 MG/L
ERYTHROCYTE [SEDIMENTATION RATE] IN BLOOD BY WESTERGREN METHOD: 12 MM/HR

## 2018-03-27 PROCEDURE — 36415 COLL VENOUS BLD VENIPUNCTURE: CPT

## 2018-03-27 PROCEDURE — 99204 OFFICE O/P NEW MOD 45 MIN: CPT | Mod: S$GLB,,, | Performed by: PODIATRIST

## 2018-03-27 PROCEDURE — 86038 ANTINUCLEAR ANTIBODIES: CPT

## 2018-03-27 PROCEDURE — 99499 UNLISTED E&M SERVICE: CPT | Mod: S$GLB,,, | Performed by: PODIATRIST

## 2018-03-27 PROCEDURE — 99999 PR PBB SHADOW E&M-EST. PATIENT-LVL III: CPT | Mod: PBBFAC,,, | Performed by: PODIATRIST

## 2018-03-27 PROCEDURE — 85651 RBC SED RATE NONAUTOMATED: CPT

## 2018-03-27 PROCEDURE — 86140 C-REACTIVE PROTEIN: CPT

## 2018-03-27 NOTE — PROGRESS NOTES
Subjective:     Patient ID: Renee Arteaga is a 72 y.o. female.    Chief Complaint: Foot Problem (PCP Dr. Julio 3/01/18 , Dark spots on 2nd and 3rd toes on right foot, states that now the hallux is starting to show similar signs. She does stats that they hurt and are tender to touch, rates pain 3/10 and desribes it as really sore. She states that she is not a diabetic.)    Renee is a 72 y.o. female who presents to the clinic for evaluation and treatment of high risk feet. Renee has a past medical history of Anxiety; Arthritis; Depression; Heart murmur; Hyperlipidemia; Scoliosis; and Tachycardia. The patient's chief complaint is spots on the toes of her right foot. Patient states she noticed the spots about 1-2 months ago. Patient denies any injury or drainage from the toes.     PCP: Odessa Julio MD    Date Last Seen by PCP: 3/1/18    Current shoe gear:  Affected Foot: Slip-on shoes     Unaffected Foot: Slip-on shoes    History of Trauma: negative  Sign of Infection: none    Patient Active Problem List   Diagnosis    Heart palpitations    Dyslipidemia    Idiopathic osteoporosis    Senile osteoporosis    Lumbosacral spondylosis without myelopathy    Arthritis of right hip    Arthritis of left hip       Medication List with Changes/Refills   Current Medications    ALPRAZOLAM (XANAX) 0.5 MG TABLET    Take 1 tablet (0.5 mg total) by mouth 3 (three) times daily.    ASPIRIN (ECOTRIN) 81 MG EC TABLET    Take 1 tablet by mouth Daily. 1 Tablet, Delayed Release (E.C.) Oral Every day    FENOFIBRATE (TRICOR) 54 MG TABLET    Take 1 tablet (54 mg total) by mouth once daily.    MELOXICAM (MOBIC) 15 MG TABLET    Take 1 tablet (15 mg total) by mouth once daily.    METOPROLOL SUCCINATE (TOPROL-XL) 25 MG 24 HR TABLET    Take 1 tablet (25 mg total) by mouth once daily.    PANTOPRAZOLE (PROTONIX) 40 MG TABLET    Take 1 tablet (40 mg total) by mouth once daily.    PRAVASTATIN (PRAVACHOL) 40 MG TABLET    Take 1 tablet (40 mg total)  "by mouth once daily. 1 Tablet Oral Every day    TRIAMCINOLONE ACETONIDE 0.1% (KENALOG) 0.1 % CREAM    Apply topically 2 (two) times daily.       Review of patient's allergies indicates:  No Known Allergies    Past Surgical History:   Procedure Laterality Date    BACK SURGERY      bladder/rectum lift      HYSTERECTOMY         Family History   Problem Relation Age of Onset    Arthritis Mother     Lung disease Mother     Heart disease Father        Social History     Social History    Marital status:      Spouse name: N/A    Number of children: N/A    Years of education: N/A     Occupational History    Not on file.     Social History Main Topics    Smoking status: Never Smoker    Smokeless tobacco: Never Used    Alcohol use No    Drug use: No    Sexual activity: Not on file     Other Topics Concern    Not on file     Social History Narrative    No narrative on file       Vitals:    03/27/18 1045   BP: 103/65   Pulse: 65   Weight: 64.1 kg (141 lb 6.8 oz)   Height: 5' 7" (1.702 m)     Review of Systems   Constitutional: Negative for chills and fever.   Respiratory: Negative for shortness of breath.    Cardiovascular: Negative for chest pain, palpitations, orthopnea, claudication and leg swelling.   Gastrointestinal: Negative for diarrhea, nausea and vomiting.   Musculoskeletal: Negative for joint pain.   Skin: Negative for rash.   Neurological: Negative for dizziness, tingling, sensory change, focal weakness and weakness.   Psychiatric/Behavioral: Negative.            Objective:      PHYSICAL EXAM: Apperance: Alert and orient in no distress,well developed, and with good attention to grooming and body habits  Patient presents ambulating in open toes sandals.   Lower Extremity Exam  VASCULAR: Dorsalis pedis pulses 0/4 right 1/4 left(both not heard on doppler) and Posterior Tibial pulses 0/4 bilateral(monophasic with doppler). Capillary fill time <4 seconds bilateral. No edema observed bilateral. " Varicosities absent bilateral. Skin temperature of the lower extremities is warm to cold, proximal to distal. Hair growth dim bilateral. (--) lymphangitis or (--) cellulitis noted bilateral.  DERMATOLOGICAL: (--) edema, (+) erythema to distal right 2nd and 3rd toes, (--) malodor, (--) drainage, (--) warmth to bilateral foot. No open area noted to dark lesion of distal right 2nd and 3rd toes.  The dorsum surface of the feet are soft and supple.  The plantar aspects of both feet are dry and scaly. Webspaces 1,2,3,4 clean, dry and without evidence of break in skin integrity bilateral.   NEUROLOGICAL: Light touch, sharp-dull, proprioception all present and equal bilaterally.   MUSCULOSKELETAL: Muscle strength is 5/5 for foot inverters, everters, plantarflexors, and dorsiflexors. Muscle tone is normal.  Inspection/palpation of bone, joints and muscles unremarkable with the exception of that noted above.                Assessment:       Encounter Diagnoses   Name Primary?    Embolic disease of toe Yes    Arterial insufficiency of lower extremity          Plan:   Embolic disease of toe  -     Cardiology Lab NAINA Resting, Lower Extremities; Future  -     VIVIAN; Future; Expected date: 03/27/2018  -     Sedimentation rate, manual; Future; Expected date: 03/27/2018  -     C-reactive protein; Future; Expected date: 03/27/2018    Arterial insufficiency of lower extremity      I counseled the patient on her conditions, regarding findings of my examination, my impressions, and usual treatment plan.   Ordered bilateral lower extremities NAINA. Patient states she understands the importance of this test.   Ordered ESR,CRP, and VIVIAN to rule out Raynauds.   Prescribed topical compound consisting of nifedipine, lidocaine, and prilocaine to be applied to feet twice daily. Discussed with patient that there may be out of pocket cost of cream pending insurance, and that they may refuse medication if cost is an issue. Patient states they  understand.   Patient to return pending testing     (Patient has bed bound daughter and it is difficult to schedule/keep appointments)                Romeo Sarah DPM  [facility]

## 2018-03-27 NOTE — LETTER
March 27, 2018      Odessa Julio MD  [address] [place] [address] [address] [place] [address]           [facility]  [address] [place] [address] [place] [address]  Phone: [phone]  Fax: [phone]          Patient: Renee Arteaga   MR Number: [id number]   YOB: 1945   Date of Visit: 3/27/2018       Dear Dr. Odessa Julio:    Thank you for referring Renee Arteaga to me for evaluation. Attached you will find relevant portions of my assessment and plan of care.    If you have questions, please do not hesitate to call me. I look forward to following Renee Arteaga along with you.    Sincerely,    Romeo Sarah DPM    Enclosure  CC:  No Recipients    If you would like to receive this communication electronically, please contact [email or web address] or [phone] to request more information on [employer] access.    For providers and/or their staff who would like to refer a patient to [facility], please contact us through our one-stop-shop provider referral line, [facility], at [phone].    If you feel you have received this communication in error or would no longer like to receive these types of communications, please e-mail [email or web address]

## 2018-03-28 LAB — ANA SER QL IF: NORMAL

## 2018-04-02 ENCOUNTER — CLINICAL SUPPORT (OUTPATIENT)
Dept: CARDIOLOGY | Facility: CLINIC | Age: 73
End: 2018-04-02
Attending: PODIATRIST
Payer: MEDICARE

## 2018-04-02 DIAGNOSIS — I74.3 EMBOLIC DISEASE OF TOE: ICD-10-CM

## 2018-04-02 LAB — VASCULAR ANKLE BRACHIAL INDEX (ABI) RIGHT: 0.95 (ref 0.9–1.2)

## 2018-04-02 PROCEDURE — 93922 UPR/L XTREMITY ART 2 LEVELS: CPT | Mod: S$GLB,,, | Performed by: INTERNAL MEDICINE

## 2018-04-09 ENCOUNTER — TELEPHONE (OUTPATIENT)
Dept: PODIATRY | Facility: CLINIC | Age: 73
End: 2018-04-09

## 2018-04-09 ENCOUNTER — DOCUMENTATION ONLY (OUTPATIENT)
Dept: HEMATOLOGY/ONCOLOGY | Facility: CLINIC | Age: 73
End: 2018-04-09

## 2018-04-09 ENCOUNTER — TELEPHONE (OUTPATIENT)
Dept: SURGERY | Facility: CLINIC | Age: 73
End: 2018-04-09

## 2018-04-09 NOTE — TELEPHONE ENCOUNTER
----- Message from Caesar Wang sent at 4/9/2018  6:49 AM CDT -----  Pt is requesting a ana cristina from nurse to discuss lab results.        Please call pt back at [phone]

## 2018-04-09 NOTE — TELEPHONE ENCOUNTER
----- Message from Pamela Roth sent at 4/9/2018  9:58 AM CDT -----  Contact: pt    Pt is requesting a call back from nurse in regards to pt lab results.        [phone] (home)

## 2018-04-09 NOTE — TELEPHONE ENCOUNTER
Ms. Renee Arteaga was given a return call, and informed on her results per Dr. Sarah. She was also scheduled with Dr. Gresham for 4/12/2018 at the [facility] 2nd floor for 8:45am. Patient verbalized understanding, and the call ended well.

## 2018-04-09 NOTE — PROGRESS NOTES
Notified patient of pathology results from recent left breast core biopsy ultrasound guided.  Low-grade fibrovascular proliferation, favor cavernous hemangioma.  Comment: Although the lack of significant cytologic atypia, mitotic activity or necrosis favors a benign hemangioma, the clinical impression of a mass lesion within the breast raises the suspicion of a well differentiated angiosarcoma.    Complete excision of the lesion is recommended.    Explained the need to schedule patient with a surgeon to discuss and schedule complete excision of the left breast solid mass.  Patient verbalizes understanding.    Discussed with patient how the appointment would be scheduled and what would be discussed during the appointment.  Explained the outpatient procedure.    Patient denies any redness or drainage from her core biopsy site.  Has a little soreness.  No fever or redness.    We will call her with a surgeon appointment for next week at [facility] location per her request.

## 2018-04-09 NOTE — TELEPHONE ENCOUNTER
----- Message from Pamela Roth sent at 4/9/2018 10:00 AM CDT -----  Contact: pt   Pt is requesting a call back from nurse in regards to pt seeing the ken this  Week instead of next week.       [phone] (home)

## 2018-04-09 NOTE — TELEPHONE ENCOUNTER
----- Message from Donya Small sent at 4/9/2018  8:51 AM CDT -----  Contact: pt  States she spoke with you this morning and she has some questions. Please call pt at [phone]. Thank you

## 2018-04-12 ENCOUNTER — CLINICAL SUPPORT (OUTPATIENT)
Dept: CARDIOLOGY | Facility: CLINIC | Age: 73
End: 2018-04-12
Attending: INTERNAL MEDICINE
Payer: MEDICARE

## 2018-04-12 ENCOUNTER — OFFICE VISIT (OUTPATIENT)
Dept: CARDIOLOGY | Facility: CLINIC | Age: 73
End: 2018-04-12
Payer: MEDICARE

## 2018-04-12 VITALS
WEIGHT: 144.5 LBS | HEIGHT: 67 IN | SYSTOLIC BLOOD PRESSURE: 106 MMHG | DIASTOLIC BLOOD PRESSURE: 68 MMHG | HEART RATE: 52 BPM | BODY MASS INDEX: 22.68 KG/M2

## 2018-04-12 DIAGNOSIS — I73.9 PAD (PERIPHERAL ARTERY DISEASE): ICD-10-CM

## 2018-04-12 DIAGNOSIS — M47.817 LUMBOSACRAL SPONDYLOSIS WITHOUT MYELOPATHY: ICD-10-CM

## 2018-04-12 DIAGNOSIS — I70.219 ATHEROSCLEROTIC PVD WITH INTERMITTENT CLAUDICATION: ICD-10-CM

## 2018-04-12 DIAGNOSIS — E78.5 DYSLIPIDEMIA: Chronic | ICD-10-CM

## 2018-04-12 DIAGNOSIS — R00.2 HEART PALPITATIONS: ICD-10-CM

## 2018-04-12 DIAGNOSIS — I75.021 BLUE TOE SYNDROME OF RIGHT LOWER EXTREMITY: Primary | ICD-10-CM

## 2018-04-12 DIAGNOSIS — I75.021 BLUE TOE SYNDROME OF RIGHT LOWER EXTREMITY: ICD-10-CM

## 2018-04-12 PROCEDURE — 93925 LOWER EXTREMITY STUDY: CPT | Mod: S$GLB,,, | Performed by: INTERNAL MEDICINE

## 2018-04-12 PROCEDURE — 93010 ELECTROCARDIOGRAM REPORT: CPT | Mod: S$GLB,,, | Performed by: INTERNAL MEDICINE

## 2018-04-12 PROCEDURE — 93005 ELECTROCARDIOGRAM TRACING: CPT | Mod: S$GLB,,, | Performed by: INTERNAL MEDICINE

## 2018-04-12 PROCEDURE — 99204 OFFICE O/P NEW MOD 45 MIN: CPT | Mod: S$GLB,,, | Performed by: INTERNAL MEDICINE

## 2018-04-12 PROCEDURE — 99999 PR PBB SHADOW E&M-EST. PATIENT-LVL III: CPT | Mod: PBBFAC,,, | Performed by: INTERNAL MEDICINE

## 2018-04-12 PROCEDURE — 99499 UNLISTED E&M SERVICE: CPT | Mod: S$GLB,,, | Performed by: INTERNAL MEDICINE

## 2018-04-12 RX ORDER — LIDOCAINE AND PRILOCAINE 25; 25 MG/G; MG/G
CREAM TOPICAL
COMMUNITY
Start: 2018-03-28 | End: 2019-01-10

## 2018-04-12 RX ORDER — CILOSTAZOL 50 MG/1
50 TABLET ORAL 2 TIMES DAILY
Qty: 60 TABLET | Refills: 11 | Status: SHIPPED | OUTPATIENT
Start: 2018-04-12 | End: 2019-05-06 | Stop reason: SDUPTHER

## 2018-04-12 NOTE — LETTER
April 12, 2018      Romeo Sarah DPM  [address] [place] [address]           [facility]  [address] , 2nd [address]  [place] [address]  Phone: [phone]  Fax: [phone]          Patient: Renee Arteaga   MR Number: [id number]   YOB: 1945   Date of Visit: 4/12/2018       Dear Dr. Romeo Sarah:    Thank you for referring Renee Arteaga to me for evaluation. Attached you will find relevant portions of my assessment and plan of care.    If you have questions, please do not hesitate to call me. I look forward to following Renee Arteaga along with you.    Sincerely,    Paris Lopez, LPN    Enclosure  CC:  No Recipients    If you would like to receive this communication electronically, please contact [email or web address] or [phone] to request more information on [employer] Link access.    For providers and/or their staff who would like to refer a patient to [facility], please contact us through our one-stop-shop provider referral line, [facility], at [phone].    If you feel you have received this communication in error or would no longer like to receive these types of communications, please e-mail [email or web address]

## 2018-04-12 NOTE — PROGRESS NOTES
Subjective:   Patient ID:  Renee Arteaga is a 73 y.o. female who presents for evaluation of Peripheral Arterial Disease      HPI  A 72 yo female with blue toe on the rt with pvd referred by DR GRIFFIN for evaluation. The patient  Has noticed  A block dot on the tip of the  Third rt toe  And had another corn on the rt second toe that got discolored. Her feet are cold and hot she has weakness in legs and claudication in both legs thought to HAVE A NEUROGENIC COMPONENET.  SHE AHS RAYNAUD'S  IN THE UPPER EXTREMITIES DURING WINTER BUT NOT SUMMER TIME. NO ULCERS IN FINGERS. HAS NEVER SMOKED BUT HAD SECOND HAND SMOKING. HAS NO  PAIN IN FOOT HAS TINGLING AND BURNING IN THE RT FOOT. THE TOES  ARE ONLY IN THE TWO TOES.  NO TRAUMA. SHE HAD AN NAINA SHOWING FLAT WAVEFORM ION THE TOES ON THE RT WITH TBI 0.32.NO H/O VACSULITIS SHE HAS ARTHRITIS NOT DEFINED HAS NOT SEEN RHEUMATOLOGY NO ETOH USE . SHE DRINKS A LOT OF COFFEE NO H/O AFIB BUT AHS PALPITATION AND IS ON B BLOCKERS SHE SEES DR VEGA.HER VIVIAN CRP ESR HEPATITIS IS NEGATIVE.  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 4/12/2018    Depression     Heart murmur     Hyperlipidemia     Scoliosis     Tachycardia        Past Surgical History:   Procedure Laterality Date    BACK SURGERY      bladder/rectum lift      HYSTERECTOMY         Social History   Substance Use Topics    Smoking status: Never Smoker    Smokeless tobacco: Never Used    Alcohol use No       Family History   Problem Relation Age of Onset    Arthritis Mother     Lung disease Mother     Heart disease Father        Current Outpatient Prescriptions   Medication Sig    ALPRAZolam (XANAX) 0.5 MG tablet Take 1 tablet (0.5 mg total) by mouth 3 (three) times daily.    aspirin (ECOTRIN) 81 MG EC tablet Take 1 tablet by mouth Daily. 1 Tablet, Delayed Release (E.C.) Oral  Every day    fenofibrate (TRICOR) 54 MG tablet Take 1 tablet (54 mg total) by mouth once daily.    lidocaine-prilocaine (EMLA) cream     meloxicam (MOBIC) 15 MG tablet Take 1 tablet (15 mg total) by mouth once daily.    metoprolol succinate (TOPROL-XL) 25 MG 24 hr tablet Take 1 tablet (25 mg total) by mouth once daily.    pantoprazole (PROTONIX) 40 MG tablet Take 1 tablet (40 mg total) by mouth once daily.    pravastatin (PRAVACHOL) 40 MG tablet Take 1 tablet (40 mg total) by mouth once daily. 1 Tablet Oral Every day     No current facility-administered medications for this visit.      Current Outpatient Prescriptions on File Prior to Visit   Medication Sig    ALPRAZolam (XANAX) 0.5 MG tablet Take 1 tablet (0.5 mg total) by mouth 3 (three) times daily.    aspirin (ECOTRIN) 81 MG EC tablet Take 1 tablet by mouth Daily. 1 Tablet, Delayed Release (E.C.) Oral Every day    fenofibrate (TRICOR) 54 MG tablet Take 1 tablet (54 mg total) by mouth once daily.    meloxicam (MOBIC) 15 MG tablet Take 1 tablet (15 mg total) by mouth once daily.    metoprolol succinate (TOPROL-XL) 25 MG 24 hr tablet Take 1 tablet (25 mg total) by mouth once daily.    pantoprazole (PROTONIX) 40 MG tablet Take 1 tablet (40 mg total) by mouth once daily.    pravastatin (PRAVACHOL) 40 MG tablet Take 1 tablet (40 mg total) by mouth once daily. 1 Tablet Oral Every day    [DISCONTINUED] triamcinolone acetonide 0.1% (KENALOG) 0.1 % cream Apply topically 2 (two) times daily.     No current facility-administered medications on file prior to visit.        Review of patient's allergies indicates:  No Known Allergies    Review of Systems   Constitution: Negative for diaphoresis, weakness, malaise/fatigue, night sweats and weight gain.   HENT: Negative for hoarse voice.    Eyes: Negative for double vision and visual disturbance.   Cardiovascular: Positive for claudication. Negative for chest pain, cyanosis, dyspnea on exertion, irregular  heartbeat, leg swelling, near-syncope, orthopnea, palpitations, paroxysmal nocturnal dyspnea and syncope.   Respiratory: Negative for cough, hemoptysis, shortness of breath and snoring.    Hematologic/Lymphatic: Negative for bleeding problem. Does not bruise/bleed easily.   Skin: Positive for color change, nail changes, poor wound healing and suspicious lesions.   Musculoskeletal: Positive for arthritis, back pain and joint pain. Negative for muscle cramps, muscle weakness and myalgias.   Gastrointestinal: Negative for bloating, abdominal pain, change in bowel habit, diarrhea, heartburn, hematemesis, hematochezia, melena and nausea.   Neurological: Negative for excessive daytime sleepiness, dizziness, headaches, light-headedness, loss of balance and numbness.   Psychiatric/Behavioral: Negative for memory loss. The patient does not have insomnia.    Allergic/Immunologic: Negative for hives.       Objective:   Physical Exam   Constitutional: She is oriented to person, place, and time. She appears well-developed and well-nourished. She does not appear ill. No distress.   HENT:   Head: Normocephalic and atraumatic.   Eyes: EOM are normal. Pupils are equal, round, and reactive to light. No scleral icterus.   Neck: Normal range of motion. Neck supple. Normal carotid pulses, no hepatojugular reflux and no JVD present. Carotid bruit is not present. No tracheal deviation present. No thyromegaly present.   Cardiovascular: Normal rate, regular rhythm and normal heart sounds.  Exam reveals decreased pulses. Exam reveals no gallop and no friction rub.    No murmur heard.  Pulses:       Carotid pulses are 2+ on the right side, and 2+ on the left side.       Radial pulses are 2+ on the right side, and 2+ on the left side.        Femoral pulses are 2+ on the right side with bruit, and 2+ on the left side.       Popliteal pulses are 1+ on the right side, and 2+ on the left side.        Dorsalis pedis pulses are 0 on the right side,  "and 0 on the left side.        Posterior tibial pulses are 1+ on the right side, and 2+ on the left side.   Pulmonary/Chest: Effort normal and breath sounds normal. No respiratory distress. She has no wheezes. She has no rhonchi. She has no rales. She exhibits no tenderness.   Abdominal: Soft. Normal appearance, normal aorta and bowel sounds are normal. She exhibits no abdominal bruit, no ascites and no pulsatile midline mass. There is no hepatomegaly. There is no tenderness.   Musculoskeletal: She exhibits no edema.        Right shoulder: She exhibits no deformity.   Neurological: She is alert and oriented to person, place, and time. She has normal strength. No cranial nerve deficit. Coordination normal.   Skin: Skin is warm and dry. No rash noted. She is not diaphoretic. No cyanosis or erythema. Nails show no clubbing.   SEE PHOTO   Psychiatric: She has a normal mood and affect. Her speech is normal and behavior is normal.   Nursing note and vitals reviewed.            Vitals:    04/12/18 0846   BP: 106/68   Pulse: (!) 52   Weight: 65.5 kg (144 lb 8.2 oz)   Height: 5' 7" (1.702 m)     Lab Results   Component Value Date    CHOL 229 (H) 10/25/2016    CHOL 232 (H) 07/18/2013     Lab Results   Component Value Date    HDL 62 10/25/2016    HDL 59 07/18/2013     Lab Results   Component Value Date    LDLCALC 132.0 10/25/2016    LDLCALC 145.0 07/18/2013     Lab Results   Component Value Date    TRIG 175 (H) 10/25/2016    TRIG 139 07/18/2013     Lab Results   Component Value Date    CHOLHDL 27.1 10/25/2016    CHOLHDL 25.4 07/18/2013       Chemistry        Component Value Date/Time     10/25/2016 0955    K 4.8 10/25/2016 0955     10/25/2016 0955    CO2 26 10/25/2016 0955    BUN 16 10/25/2016 0955    CREATININE 1.0 10/25/2016 0955     10/25/2016 0955        Component Value Date/Time    CALCIUM 9.8 10/25/2016 0955    ALKPHOS 69 10/25/2016 0955    AST 15 10/25/2016 0955    ALT 12 10/25/2016 0955    BILITOT " 0.6 10/25/2016 0955    ESTGFRAFRICA >60.0 10/25/2016 0955    EGFRNONAA 57.2 (A) 10/25/2016 0955          Lab Results   Component Value Date    TSH 1.387 07/18/2013     No results found for: INR, PROTIME  Lab Results   Component Value Date    WBC 7.52 07/18/2013    HGB 14.0 07/18/2013    HCT 42.3 07/18/2013    MCV 90.2 07/18/2013     07/18/2013     BNP  @LABRCNTIP(BNP,BNPTRIAGEBLO)@  CrCl cannot be calculated (Patient's most recent lab result is older than the maximum 7 days allowed.).     Date of Procedure: 04/02/2018    PRE-TEST DATA   Resting NAINA:    The right ankle brachial index was 0.95 which suggests minimal right lower extremity arterial disease.   The left ankle brachial index was 1.00 which is normal.   The right TBI is 0.32.   The left TBI is 0.71.   Biphasic waveforms bilaterally at ankle but monophasic waveform L distal at toes and flat waveform right extremity suggesting distal PAD on right LE.             This document has been electronically    SIGNED BY: Stoney Cobos MD On: 04/02/2018 11:56  Assessment:     1. Blue toe syndrome of right lower extremity    2. PAD (peripheral artery disease)    3. Atherosclerotic PVD with intermittent claudication    4. Lumbosacral spondylosis without myelopathy    5. Heart palpitations    6. Dyslipidemia      HAS BLUE TOE SYNDROME WITH TRIPAHSIC WAVEFORMS BY MY DOPPLER EXAM IN CLINIC IN BOTH PT AND POPLITEAL ARTERIES DESPITE DIMINISHED PULSE ON THE RT. HAS SOME NEUROGENIC /VASCULAR CLAUDICATION SYMPTOMS W/O OBVIOUS CAUSE FOR Buerguer's vasculitis although she has RAYNAUD. I THINK AT THIS STAGE WOULD LIKE TO OBTAIN AN ARTERIAL DUOPLEX OF THE LOWER EXTREMITIES AND PLACE HER OMN PLETAL TO SEE IF IT IMPROVES. MAY NEED TO R/O EMBOLIC EVENT  FROM AORTA IF U/S IS NEGATIVE AND MAY BE sophie. Will make further recommendation as u/s result available. If w/u negative may need RHEUMATOLOGY IN PUT.    Plan:   ARTERIAL DUPLEX LOWER EXTREMITIES BILATERAL.  PLETAL 50 MG PO BID    FURTHER RECOMMENDATION TO FOLLOW.

## 2018-04-13 ENCOUNTER — TELEPHONE (OUTPATIENT)
Dept: CARDIOLOGY | Facility: CLINIC | Age: 73
End: 2018-04-13

## 2018-04-13 DIAGNOSIS — I77.1 ARTERIAL STRICTURE: ICD-10-CM

## 2018-04-13 DIAGNOSIS — R00.2 PALPITATION: Primary | ICD-10-CM

## 2018-04-13 NOTE — TELEPHONE ENCOUNTER
----- Message from Codi Gresham MD sent at 4/13/2018  7:35 AM CDT -----  I have reviewed the u/s there is no significant blockage in the lower extremity. I think we need do do a repeat holter and get  Cta of abdomen make sure there is no shaggy aorta or reason to embolize from aorta then I will see her back in follow up in 1 month and see how the pletal worked for her.

## 2018-04-16 ENCOUNTER — TELEPHONE (OUTPATIENT)
Dept: CARDIOLOGY | Facility: CLINIC | Age: 73
End: 2018-04-16

## 2018-04-16 NOTE — TELEPHONE ENCOUNTER
----- Message from Tiffany Lizama sent at 4/16/2018  8:44 AM CDT -----  Contact: Pt  Please give pt a call at ..[phone] (home) regarding if her MRI is open or closed. Pt would prefer the open one.

## 2018-04-18 ENCOUNTER — OFFICE VISIT (OUTPATIENT)
Dept: SURGERY | Facility: CLINIC | Age: 73
End: 2018-04-18
Payer: MEDICARE

## 2018-04-18 ENCOUNTER — LAB VISIT (OUTPATIENT)
Dept: LAB | Facility: HOSPITAL | Age: 73
End: 2018-04-18
Attending: SURGERY
Payer: MEDICARE

## 2018-04-18 VITALS — WEIGHT: 144 LBS | BODY MASS INDEX: 22.55 KG/M2

## 2018-04-18 DIAGNOSIS — R92.8 ABNORMAL MAMMOGRAM OF LEFT BREAST: ICD-10-CM

## 2018-04-18 DIAGNOSIS — R92.8 ABNORMAL MAMMOGRAM OF LEFT BREAST: Primary | ICD-10-CM

## 2018-04-18 LAB
ANION GAP SERPL CALC-SCNC: 10 MMOL/L
BASOPHILS # BLD AUTO: 0.05 K/UL
BASOPHILS NFR BLD: 0.5 %
BUN SERPL-MCNC: 16 MG/DL
CALCIUM SERPL-MCNC: 9.4 MG/DL
CHLORIDE SERPL-SCNC: 100 MMOL/L
CO2 SERPL-SCNC: 25 MMOL/L
CREAT SERPL-MCNC: 1 MG/DL
DIFFERENTIAL METHOD: ABNORMAL
EOSINOPHIL # BLD AUTO: 0.3 K/UL
EOSINOPHIL NFR BLD: 2.9 %
ERYTHROCYTE [DISTWIDTH] IN BLOOD BY AUTOMATED COUNT: 13.4 %
EST. GFR  (AFRICAN AMERICAN): >60 ML/MIN/1.73 M^2
EST. GFR  (NON AFRICAN AMERICAN): 56.4 ML/MIN/1.73 M^2
GLUCOSE SERPL-MCNC: 103 MG/DL
HCT VFR BLD AUTO: 38.7 %
HGB BLD-MCNC: 12.1 G/DL
IMM GRANULOCYTES # BLD AUTO: 0.08 K/UL
IMM GRANULOCYTES NFR BLD AUTO: 0.7 %
LYMPHOCYTES # BLD AUTO: 1.7 K/UL
LYMPHOCYTES NFR BLD: 16.1 %
MCH RBC QN AUTO: 29.9 PG
MCHC RBC AUTO-ENTMCNC: 31.3 G/DL
MCV RBC AUTO: 96 FL
MONOCYTES # BLD AUTO: 0.5 K/UL
MONOCYTES NFR BLD: 4.9 %
NEUTROPHILS # BLD AUTO: 8 K/UL
NEUTROPHILS NFR BLD: 74.9 %
NRBC BLD-RTO: 0 /100 WBC
PLATELET # BLD AUTO: 361 K/UL
PMV BLD AUTO: 10.4 FL
POTASSIUM SERPL-SCNC: 4.8 MMOL/L
RBC # BLD AUTO: 4.05 M/UL
SODIUM SERPL-SCNC: 135 MMOL/L
WBC # BLD AUTO: 10.71 K/UL

## 2018-04-18 PROCEDURE — 99202 OFFICE O/P NEW SF 15 MIN: CPT | Mod: S$GLB,,, | Performed by: SURGERY

## 2018-04-18 PROCEDURE — 85025 COMPLETE CBC W/AUTO DIFF WBC: CPT

## 2018-04-18 PROCEDURE — 99999 PR PBB SHADOW E&M-EST. PATIENT-LVL III: CPT | Mod: PBBFAC,,, | Performed by: SURGERY

## 2018-04-18 PROCEDURE — 36415 COLL VENOUS BLD VENIPUNCTURE: CPT

## 2018-04-18 PROCEDURE — 80048 BASIC METABOLIC PNL TOTAL CA: CPT

## 2018-04-18 RX ORDER — SODIUM CHLORIDE 9 MG/ML
INJECTION, SOLUTION INTRAVENOUS CONTINUOUS
Status: CANCELLED | OUTPATIENT
Start: 2018-04-18

## 2018-04-19 DIAGNOSIS — G47.00 INSOMNIA, UNSPECIFIED TYPE: ICD-10-CM

## 2018-04-19 DIAGNOSIS — F41.9 ANXIETY: ICD-10-CM

## 2018-04-19 RX ORDER — ALPRAZOLAM 0.5 MG/1
0.5 TABLET ORAL 3 TIMES DAILY
Qty: 90 TABLET | Refills: 0 | Status: SHIPPED | OUTPATIENT
Start: 2018-04-19 | End: 2018-04-20 | Stop reason: SDUPTHER

## 2018-04-19 NOTE — PROGRESS NOTES
Patient ID: Renee Arteaga is a 73 y.o. female.    Chief Complaint: Consult and Cyst      HPI: Patient presents today to discuss her recent left breast biopsy to discuss surgical excision.  Her biopsy showed Low-grade fibrovascular proliferation, favor cavernous hemangioma.  Comment: Although the lack of significant cytologic atypia, mitotic activity or necrosis favors a benign hemangioma, the clinical impression of a mass lesion within the breast raises the suspicion of a well differentiated angiosarcoma. Complete excision of the lesion is recommended.    Previously seen by Snow Kathleen-Patient presents for the evaluation of an abnormal mammogram and palpable left breast mass.     Menarche at 13 y/o  G 1 P 1  First pregnancy at 22 y/o  LMP: 41 y/o  Estrogen: 10 years  Radiation to the neck or chest wall- none    Prior breast biopsies or atypical hyperplasia - none     FH: none      Review of Systems   Constitutional: Negative.    HENT: Negative.    Eyes: Negative.    Respiratory: Negative.    Cardiovascular: Negative.    Gastrointestinal: Negative.         No reflux   Endocrine: Negative.    Genitourinary: Negative.    Musculoskeletal: Negative.    Skin: Negative.    Allergic/Immunologic: Negative.    Neurological: Negative.    Hematological: Negative.  Negative for adenopathy.   Psychiatric/Behavioral: Negative.        Breast: Pt denies any breast pain or nipple discharge. Pt noted a vague area of prominence May 2017 - not tender. Imaging in July was negative. Noted since that time the area has increased in size slightly and more prominent.  No prior trauma or bruising. No breast surgeries or abnormalities.    Current Outpatient Prescriptions   Medication Sig Dispense Refill    aspirin (ECOTRIN) 81 MG EC tablet Take 1 tablet by mouth Daily. 1 Tablet, Delayed Release (E.C.) Oral Every day      cilostazol (PLETAL) 50 MG Tab Take 1 tablet (50 mg total) by mouth 2 (two) times daily. 60 tablet 11    fenofibrate  (TRICOR) 54 MG tablet Take 1 tablet (54 mg total) by mouth once daily. 30 tablet 11    lidocaine-prilocaine (EMLA) cream       meloxicam (MOBIC) 15 MG tablet Take 1 tablet (15 mg total) by mouth once daily. 90 tablet 3    metoprolol succinate (TOPROL-XL) 25 MG 24 hr tablet Take 1 tablet (25 mg total) by mouth once daily. 90 tablet 3    pantoprazole (PROTONIX) 40 MG tablet Take 1 tablet (40 mg total) by mouth once daily. 30 tablet 6    pravastatin (PRAVACHOL) 40 MG tablet Take 1 tablet (40 mg total) by mouth once daily. 1 Tablet Oral Every day 90 tablet 3    ALPRAZolam (XANAX) 0.5 MG tablet Take 1 tablet (0.5 mg total) by mouth 3 (three) times daily. 90 tablet 2     No current facility-administered medications for this visit.        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     Social History Main Topics    Smoking status: Never Smoker    Smokeless tobacco: Never Used    Alcohol use No    Drug use: No    Sexual activity: Not on file     Other Topics Concern    Not on file     Social History Narrative    No narrative on file       There were no vitals filed for this visit.    Physical Exam   Constitutional: She is oriented to person, place, and time. She appears well-developed and  well-nourished.   HENT:   Head: Normocephalic and atraumatic.   Right Ear: External ear normal.   Left Ear: External ear normal.   Mouth/Throat: No oropharyngeal exudate.   Eyes: Conjunctivae and EOM are normal. Pupils are equal, round, and reactive to light. Right eye exhibits no discharge. Left eye exhibits no discharge. No scleral icterus.   Neck: Normal range of motion. Neck supple. No thyromegaly present.   Cardiovascular: Normal rate, regular rhythm and normal heart sounds.    Pulmonary/Chest: Effort normal and breath sounds normal. Right breast exhibits no inverted nipple, no mass, no nipple discharge, no skin change and no tenderness. Left breast exhibits mass. Left breast exhibits no inverted nipple, no nipple discharge, no skin change and no tenderness.       Abdominal: Soft. Bowel sounds are normal.   Musculoskeletal: Normal range of motion. She exhibits no edema.        Right shoulder: She exhibits no crepitus and normal strength.   Lymphadenopathy:        Head (right side): No submental, no submandibular, no tonsillar, no preauricular, no posterior auricular and no occipital adenopathy present.        Head (left side): No submental, no submandibular, no tonsillar, no preauricular, no posterior auricular and no occipital adenopathy present.     She has no cervical adenopathy.        Right cervical: No superficial cervical and no posterior cervical adenopathy present.       Left cervical: No superficial cervical and no posterior cervical adenopathy present.     She has no axillary adenopathy.        Right: No supraclavicular adenopathy present.        Left: No supraclavicular adenopathy present.   Neurological: She is alert and oriented to person, place, and time. She has normal reflexes.   Skin: Skin is warm and dry. No rash noted. No erythema. No pallor.   Psychiatric: She has a normal mood and affect. Her behavior is normal. Judgment and thought content normal.       Imaging:   Result:   Mammo Digital  Diagnostic Left with Tomosynthesis_CAD  US Breast Left Limited     History:  Patient is 72 y.o. and is seen for a diagnostic mammogram.     Films Compared:  07/26/2017 Mammo Digital Diagnostic Bilat with Tomosynthesis_CAD, 07/26/2017 US Breast Left Limited, and 03/16/2016 Mammo Digital Screening Bilat with CAD     Findings:  This procedure was performed using tomosynthesis.  Computer-aided detection was utilized in the interpretation of this examination.  Mammo Digital Diagnostic Left with Tomosynthesis_CAD  The breast has scattered areas of fibroglandular density.     There is a mass seen in the lower outer quadrant of the left breast.  This corresponds to a palpable lump.  An additional mass in the outer upper left breast is stable for over 2 years and therefore benign.     US Breast Left Limited  There is a new heterogenous solid mass seen in the left breast at 5 o'clock.  This measures  16 x 13 x 6 mm and contains solid and more cystic components. No shadowing is seen.     Impression:  Left  Mass: Left breast mass at the lower outer position. Assessment: 4 - Suspicious finding. Biopsy is recommended.      BI-RADS Category:   Left: 4 - Suspicious  Overall: 4 - Suspicious     Recommendation:  Biopsy is recommended.     The patient's estimated lifetime risk of breast cancer (to age 85) based on Tyrer-Cuzick - 7 risk assessment model is: Tyrer-Cuzick: 4.34 %. According to the [employer] Society,  patients with a lifetime breast cancer risk of 20% or higher might benefit from supplemental screening tests.    Encounter     View Encounter            Pathology:  Low-grade fibrovascular proliferation, favor cavernous hemangioma.  Comment: Although the lack of significant cytologic atypia, mitotic activity or necrosis favors a benign hemangioma, the clinical impression of a mass lesion within the breast raises the suspicion of a well differentiated angiosarcoma.  Complete excision of the lesion is  recommended.    Assessment & Plan:    73-year-old female with biopsy favoring cavernous hemangioma but cannot rule out angiosarcoma    Left breast wire loc biopsy 4/25/18  Preop: CBC, BMP, EKG reviewed cardiology clearance, will need to hold Pletal prior to surgery  Risks and benefits discussed with patient including: Pain, bleeding, infection, no diagnosis, need for further procedure

## 2018-04-20 DIAGNOSIS — F41.9 ANXIETY: ICD-10-CM

## 2018-04-20 DIAGNOSIS — G47.00 INSOMNIA, UNSPECIFIED TYPE: ICD-10-CM

## 2018-04-23 DIAGNOSIS — R92.8 ABNORMAL MAMMOGRAM OF LEFT BREAST: Primary | ICD-10-CM

## 2018-04-23 NOTE — PRE ADMISSION SCREENING
Pre op instructions reviewed with patient per phone:    To confirm, Your surgeon has instructed you:  Surgery is scheduled 04/25/18 at 1000.      Please report to [facility] 1st floor main lobby by pt needs needle loc prior to surgery  Waiting for needle loc appt to give arrival time.  Pre admit office to call afternoon prior to surgery with final arrival time      INSTRUCTIONS IMPORTANT!!!  ¨ Do not eat, drink, or smoke after 12 midnight-including water. OK to brush teeth, no gum, candy or mints!    ¨ Take only these medicines with a small swallow of water-morning of surgery.  Metoprolol, Protonix, use Xanax , if needed        ____  Do not wear makeup, including mascara.  ____  No powder, lotions or creams to surgical area.  ____  Please remove all jewelry, including piercings and leave at home.  ____  No money or valuables needed. Please leave at home.  ____  Please bring identification and insurance information to hospital.  ____  If going home the same day, arrange for a ride home. You will not be able to   drive if Anesthesia was used.  ____  Children, under 12 years old, must remain in the waiting room with an adult.  They are not allowed in patient areas.  ____  Wear loose fitting clothing. Allow for dressings, bandages.  ____  Stop Aspirin, Ibuprofen, Motrin and Aleve at least 5-7 days before surgery, unless otherwise instructed by your doctor, or the nurse.   You MAY use Tylenol/acetaminophen until day of surgery.  ____  If you take diabetic medication, do not take am of surgery unless instructed by   Doctor.  ____ Stop taking any Fish Oil supplement or any Vitamins that contain Vitamin E at least 5 days prior to surgery.          Bathing Instructions-- The night before surgery and the morning prior to coming to the hospital:   -Do not shave the surgical area.   -Shower and wash your hair and body as usual with anti-bacterial  soap and shampoo.   -Rinse your hair and body  completely.   -Use one packet of hibiclens to wash the surgical site (using your hand) gently for 5 minutes.  Do not scrub you skin too hard.   -Do not use hibiclens on your head, face, or genitals.   -Do not wash with anti-bacterial soap after you use the hibiclens.   -Rinse your body thoroughly.   -Dry with clean, soft towel.  Do not use lotion, cream, deodorant, or powders on   the surgical site.    Use antibacterial soap in place of hibiclens if your surgery is on the head, face or genitals.         Surgical Site Infection    Prevention of surgical site infections:     -Keep incisions clean and dry.   -Do not soak/submerge incisions in water until completely healed.   -Do not apply lotions, powders, creams, or deodorants to site.   -Always make sure hands are cleaned with antibacterial soap/ alcohol-based   prior to touching the surgical site.  (This includes doctors, nurses, staff, and yourself.)    Signs and symptoms:   -Redness and pain around the area where you had surgery   -Drainage of cloudy fluid from your surgical wound   -Fever over 100.4  I have read or had read and explained to me, and understand the above information.

## 2018-04-24 ENCOUNTER — ANESTHESIA EVENT (OUTPATIENT)
Dept: SURGERY | Facility: HOSPITAL | Age: 73
End: 2018-04-24
Payer: MEDICARE

## 2018-04-25 ENCOUNTER — HOSPITAL ENCOUNTER (OUTPATIENT)
Facility: HOSPITAL | Age: 73
Discharge: HOME | End: 2018-04-25
Attending: SURGERY | Admitting: SURGERY
Payer: MEDICARE

## 2018-04-25 ENCOUNTER — SURGERY (OUTPATIENT)
Age: 73
End: 2018-04-25

## 2018-04-25 ENCOUNTER — ANESTHESIA (OUTPATIENT)
Dept: SURGERY | Facility: HOSPITAL | Age: 73
End: 2018-04-25
Payer: MEDICARE

## 2018-04-25 ENCOUNTER — HOSPITAL ENCOUNTER (OUTPATIENT)
Dept: RADIOLOGY | Facility: HOSPITAL | Age: 73
Discharge: HOME | End: 2018-04-25
Attending: SURGERY | Admitting: SURGERY
Payer: MEDICARE

## 2018-04-25 DIAGNOSIS — R92.8 ABNORMAL MAMMOGRAM OF LEFT BREAST: ICD-10-CM

## 2018-04-25 DIAGNOSIS — R07.9 CHEST PAIN: ICD-10-CM

## 2018-04-25 PROCEDURE — 93005 ELECTROCARDIOGRAM TRACING: CPT | Mod: 59

## 2018-04-25 PROCEDURE — 63600175 PHARM REV CODE 636 W HCPCS: Performed by: SURGERY

## 2018-04-25 PROCEDURE — 71000015 HC POSTOP RECOV 1ST HR: Performed by: SURGERY

## 2018-04-25 PROCEDURE — 37000008 HC ANESTHESIA 1ST 15 MINUTES: Performed by: SURGERY

## 2018-04-25 PROCEDURE — 37000009 HC ANESTHESIA EA ADD 15 MINS: Performed by: SURGERY

## 2018-04-25 PROCEDURE — 93010 ELECTROCARDIOGRAM REPORT: CPT | Mod: ,,, | Performed by: INTERNAL MEDICINE

## 2018-04-25 PROCEDURE — 88307 TISSUE EXAM BY PATHOLOGIST: CPT | Performed by: PATHOLOGY

## 2018-04-25 PROCEDURE — 63600175 PHARM REV CODE 636 W HCPCS: Performed by: NURSE ANESTHETIST, CERTIFIED REGISTERED

## 2018-04-25 PROCEDURE — 25000003 PHARM REV CODE 250: Performed by: SURGERY

## 2018-04-25 PROCEDURE — 88307 TISSUE EXAM BY PATHOLOGIST: CPT | Mod: 26,,, | Performed by: PATHOLOGY

## 2018-04-25 PROCEDURE — 36000707: Performed by: SURGERY

## 2018-04-25 PROCEDURE — 19125 EXCISION BREAST LESION: CPT | Mod: LT,,, | Performed by: SURGERY

## 2018-04-25 PROCEDURE — 36000706: Performed by: SURGERY

## 2018-04-25 PROCEDURE — 25000003 PHARM REV CODE 250: Performed by: NURSE ANESTHETIST, CERTIFIED REGISTERED

## 2018-04-25 PROCEDURE — 71000033 HC RECOVERY, INTIAL HOUR: Performed by: SURGERY

## 2018-04-25 RX ORDER — FENTANYL CITRATE 50 UG/ML
INJECTION, SOLUTION INTRAMUSCULAR; INTRAVENOUS
Status: DISCONTINUED | OUTPATIENT
Start: 2018-04-25 | End: 2018-04-25

## 2018-04-25 RX ORDER — MEPERIDINE HYDROCHLORIDE 50 MG/ML
12.5 INJECTION INTRAMUSCULAR; INTRAVENOUS; SUBCUTANEOUS ONCE AS NEEDED
Status: DISCONTINUED | OUTPATIENT
Start: 2018-04-25 | End: 2018-04-25 | Stop reason: HOSPADM

## 2018-04-25 RX ORDER — LIDOCAINE HYDROCHLORIDE 10 MG/ML
1 INJECTION, SOLUTION EPIDURAL; INFILTRATION; INTRACAUDAL; PERINEURAL ONCE
Status: DISCONTINUED | OUTPATIENT
Start: 2018-04-25 | End: 2018-04-25 | Stop reason: HOSPADM

## 2018-04-25 RX ORDER — EPHEDRINE SULFATE 50 MG/ML
INJECTION, SOLUTION INTRAVENOUS
Status: DISCONTINUED | OUTPATIENT
Start: 2018-04-25 | End: 2018-04-25

## 2018-04-25 RX ORDER — SUCCINYLCHOLINE CHLORIDE 20 MG/ML
INJECTION INTRAMUSCULAR; INTRAVENOUS
Status: DISCONTINUED | OUTPATIENT
Start: 2018-04-25 | End: 2018-04-25

## 2018-04-25 RX ORDER — DEXAMETHASONE SODIUM PHOSPHATE 4 MG/ML
INJECTION, SOLUTION INTRA-ARTICULAR; INTRALESIONAL; INTRAMUSCULAR; INTRAVENOUS; SOFT TISSUE
Status: DISCONTINUED | OUTPATIENT
Start: 2018-04-25 | End: 2018-04-25

## 2018-04-25 RX ORDER — ROCURONIUM BROMIDE 10 MG/ML
INJECTION, SOLUTION INTRAVENOUS
Status: DISCONTINUED | OUTPATIENT
Start: 2018-04-25 | End: 2018-04-25

## 2018-04-25 RX ORDER — LIDOCAINE HYDROCHLORIDE 20 MG/ML
JELLY TOPICAL
Status: DISCONTINUED | OUTPATIENT
Start: 2018-04-25 | End: 2018-04-25

## 2018-04-25 RX ORDER — PROPOFOL 10 MG/ML
VIAL (ML) INTRAVENOUS
Status: DISCONTINUED | OUTPATIENT
Start: 2018-04-25 | End: 2018-04-25

## 2018-04-25 RX ORDER — HYDROCODONE BITARTRATE AND ACETAMINOPHEN 10; 325 MG/1; MG/1
1 TABLET ORAL EVERY 4 HOURS PRN
Status: DISCONTINUED | OUTPATIENT
Start: 2018-04-25 | End: 2018-04-25 | Stop reason: HOSPADM

## 2018-04-25 RX ORDER — SODIUM CHLORIDE 9 MG/ML
INJECTION, SOLUTION INTRAVENOUS CONTINUOUS
Status: DISCONTINUED | OUTPATIENT
Start: 2018-04-25 | End: 2018-04-25 | Stop reason: HOSPADM

## 2018-04-25 RX ORDER — FENTANYL CITRATE 50 UG/ML
25 INJECTION, SOLUTION INTRAMUSCULAR; INTRAVENOUS EVERY 5 MIN PRN
Status: DISCONTINUED | OUTPATIENT
Start: 2018-04-25 | End: 2018-04-25 | Stop reason: HOSPADM

## 2018-04-25 RX ORDER — LIDOCAINE HYDROCHLORIDE 10 MG/ML
INJECTION, SOLUTION EPIDURAL; INFILTRATION; INTRACAUDAL; PERINEURAL
Status: DISCONTINUED | OUTPATIENT
Start: 2018-04-25 | End: 2018-04-25 | Stop reason: HOSPADM

## 2018-04-25 RX ORDER — HYDROCODONE BITARTRATE AND ACETAMINOPHEN 5; 325 MG/1; MG/1
1 TABLET ORAL EVERY 4 HOURS PRN
Qty: 20 TABLET | Refills: 0 | Status: SHIPPED | OUTPATIENT
Start: 2018-04-25 | End: 2018-05-09

## 2018-04-25 RX ORDER — BUPIVACAINE HYDROCHLORIDE 2.5 MG/ML
INJECTION, SOLUTION EPIDURAL; INFILTRATION; INTRACAUDAL
Status: DISCONTINUED | OUTPATIENT
Start: 2018-04-25 | End: 2018-04-25 | Stop reason: HOSPADM

## 2018-04-25 RX ORDER — HYDROCODONE BITARTRATE AND ACETAMINOPHEN 5; 325 MG/1; MG/1
1 TABLET ORAL
Status: DISCONTINUED | OUTPATIENT
Start: 2018-04-25 | End: 2018-04-25 | Stop reason: HOSPADM

## 2018-04-25 RX ORDER — CEFAZOLIN SODIUM 2 G/50ML
2 SOLUTION INTRAVENOUS
Status: COMPLETED | OUTPATIENT
Start: 2018-04-25 | End: 2018-04-25

## 2018-04-25 RX ORDER — GLYCOPYRROLATE 0.2 MG/ML
INJECTION INTRAMUSCULAR; INTRAVENOUS
Status: DISCONTINUED | OUTPATIENT
Start: 2018-04-25 | End: 2018-04-25

## 2018-04-25 RX ORDER — LIDOCAINE HYDROCHLORIDE 10 MG/ML
INJECTION INFILTRATION; PERINEURAL
Status: DISCONTINUED | OUTPATIENT
Start: 2018-04-25 | End: 2018-04-25

## 2018-04-25 RX ORDER — ONDANSETRON 2 MG/ML
4 INJECTION INTRAMUSCULAR; INTRAVENOUS EVERY 12 HOURS PRN
Status: DISCONTINUED | OUTPATIENT
Start: 2018-04-25 | End: 2018-04-25 | Stop reason: HOSPADM

## 2018-04-25 RX ORDER — SODIUM CHLORIDE, SODIUM LACTATE, POTASSIUM CHLORIDE, CALCIUM CHLORIDE 600; 310; 30; 20 MG/100ML; MG/100ML; MG/100ML; MG/100ML
INJECTION, SOLUTION INTRAVENOUS CONTINUOUS PRN
Status: DISCONTINUED | OUTPATIENT
Start: 2018-04-25 | End: 2018-04-25

## 2018-04-25 RX ORDER — DIPHENHYDRAMINE HYDROCHLORIDE 50 MG/ML
25 INJECTION INTRAMUSCULAR; INTRAVENOUS EVERY 6 HOURS PRN
Status: DISCONTINUED | OUTPATIENT
Start: 2018-04-25 | End: 2018-04-25 | Stop reason: HOSPADM

## 2018-04-25 RX ORDER — ONDANSETRON 2 MG/ML
4 INJECTION INTRAMUSCULAR; INTRAVENOUS DAILY PRN
Status: DISCONTINUED | OUTPATIENT
Start: 2018-04-25 | End: 2018-04-25 | Stop reason: HOSPADM

## 2018-04-25 RX ORDER — ONDANSETRON 2 MG/ML
INJECTION INTRAMUSCULAR; INTRAVENOUS
Status: DISCONTINUED | OUTPATIENT
Start: 2018-04-25 | End: 2018-04-25

## 2018-04-25 RX ORDER — HYDROCODONE BITARTRATE AND ACETAMINOPHEN 5; 325 MG/1; MG/1
1 TABLET ORAL EVERY 4 HOURS PRN
Status: DISCONTINUED | OUTPATIENT
Start: 2018-04-25 | End: 2018-04-25 | Stop reason: HOSPADM

## 2018-04-25 RX ADMIN — ROCURONIUM BROMIDE 5 MG: 10 INJECTION, SOLUTION INTRAVENOUS at 10:04

## 2018-04-25 RX ADMIN — SUCCINYLCHOLINE CHLORIDE 120 MG: 20 INJECTION, SOLUTION INTRAMUSCULAR; INTRAVENOUS at 10:04

## 2018-04-25 RX ADMIN — EPHEDRINE SULFATE 10 MG: 50 INJECTION, SOLUTION INTRAMUSCULAR; INTRAVENOUS; SUBCUTANEOUS at 10:04

## 2018-04-25 RX ADMIN — LIDOCAINE HYDROCHLORIDE 50 MG: 10 INJECTION, SOLUTION INFILTRATION; PERINEURAL at 10:04

## 2018-04-25 RX ADMIN — CEFAZOLIN SODIUM 2 G: 2 SOLUTION INTRAVENOUS at 10:04

## 2018-04-25 RX ADMIN — ONDANSETRON 4 MG: 2 INJECTION, SOLUTION INTRAMUSCULAR; INTRAVENOUS at 10:04

## 2018-04-25 RX ADMIN — SODIUM CHLORIDE, SODIUM LACTATE, POTASSIUM CHLORIDE, AND CALCIUM CHLORIDE: 600; 310; 30; 20 INJECTION, SOLUTION INTRAVENOUS at 10:04

## 2018-04-25 RX ADMIN — FENTANYL CITRATE 100 MCG: 50 INJECTION, SOLUTION INTRAMUSCULAR; INTRAVENOUS at 10:04

## 2018-04-25 RX ADMIN — PROPOFOL 130 MG: 10 INJECTION, EMULSION INTRAVENOUS at 10:04

## 2018-04-25 RX ADMIN — BUPIVACAINE HYDROCHLORIDE 10 ML: 2.5 INJECTION, SOLUTION EPIDURAL; INFILTRATION; INTRACAUDAL; PERINEURAL at 10:04

## 2018-04-25 RX ADMIN — LIDOCAINE HYDROCHLORIDE 10 ML: 10 INJECTION, SOLUTION EPIDURAL; INFILTRATION; INTRACAUDAL; PERINEURAL at 10:04

## 2018-04-25 RX ADMIN — ROBINUL 0.2 MG: 0.2 INJECTION INTRAMUSCULAR; INTRAVENOUS at 10:04

## 2018-04-25 RX ADMIN — DEXAMETHASONE SODIUM PHOSPHATE 4 MG: 4 INJECTION, SOLUTION INTRA-ARTICULAR; INTRALESIONAL; INTRAMUSCULAR; INTRAVENOUS; SOFT TISSUE at 10:04

## 2018-04-25 RX ADMIN — LIDOCAINE HYDROCHLORIDE 1 EACH: 20 JELLY TOPICAL at 10:04

## 2018-04-25 NOTE — ANESTHESIA PREPROCEDURE EVALUATION
04/25/2018  Renee Arteaga is a 73 y.o., female.    Anesthesia Evaluation    I have reviewed the Patient Summary Reports.    I have reviewed the Nursing Notes.   I have reviewed the Medications.     Review of Systems  Anesthesia Hx:  No problems with previous Anesthesia  Denies Family Hx of Anesthesia complications.   Denies Personal Hx of Anesthesia complications.   Social:  Non-Smoker    Hematology/Oncology:        Oncology Comments: Left breast mass   EENT/Dental:EENT/Dental Normal   Cardiovascular:   PVD    Pulmonary:  Pulmonary Normal    Renal/:  Renal/ Normal     Hepatic/GI:   GERD, well controlled    Musculoskeletal:   Arthritis   Spine Disorders: lumbar    Neurological:  Neurology Normal    Psych:   Psychiatric History anxiety          Physical Exam  General:  Well nourished    Airway/Jaw/Neck:  Airway Findings: Mouth Opening: Normal Tongue: Normal  General Airway Assessment: Adult  Mallampati: II  TM Distance: Normal, at least 6 cm  Jaw/Neck Findings:  Neck ROM: Normal ROM       Chest/Lungs:  Chest/Lungs Findings: Clear to auscultation, Normal Respiratory Rate     Heart/Vascular:  Heart Findings: Rate: Normal  Rhythm: Regular Rhythm        Mental Status:  Mental Status Findings:  Anxious         Anesthesia Plan  Type of Anesthesia, risks & benefits discussed:  Anesthesia Type:  general  Patient's Preference:   Intra-op Monitoring Plan:   Intra-op Monitoring Plan Comments:   Post Op Pain Control Plan:   Post Op Pain Control Plan Comments:   Induction:   IV  Beta Blocker:  Patient is not currently on a Beta-Blocker (No further documentation required).       Informed Consent: Patient understands risks and agrees with Anesthesia plan.  Questions answered. Anesthesia consent signed with patient.  ASA Score: 2     Day of Surgery Review of History & Physical: I have interviewed and examined the patient.  I have reviewed the patient's H&P dated:  There are no significant changes.  H&P update referred to the surgeon.         Ready For Surgery From Anesthesia Perspective.

## 2018-04-25 NOTE — INTERVAL H&P NOTE
The patient has been examined and the H&P has been reviewed:    I concur with the findings and no changes have occurred since H&P was written.    Anesthesia/Surgery risks, benefits and alternative options discussed and understood by patient/family.          Active Hospital Problems    Diagnosis  POA    Abnormal mammogram of left breast [R92.8]  Yes      Resolved Hospital Problems    Diagnosis Date Resolved POA   No resolved problems to display.

## 2018-04-25 NOTE — H&P (VIEW-ONLY)
Patient ID: Renee Arteaga is a 73 y.o. female.    Chief Complaint: Consult and Cyst      HPI: Patient presents today to discuss her recent left breast biopsy to discuss surgical excision.  Her biopsy showed Low-grade fibrovascular proliferation, favor cavernous hemangioma.  Comment: Although the lack of significant cytologic atypia, mitotic activity or necrosis favors a benign hemangioma, the clinical impression of a mass lesion within the breast raises the suspicion of a well differentiated angiosarcoma. Complete excision of the lesion is recommended.    Previously seen by Snow Kathleen-Patient presents for the evaluation of an abnormal mammogram and palpable left breast mass.     Menarche at 13 y/o  G 1 P 1  First pregnancy at 20 y/o  LMP: 41 y/o  Estrogen: 10 years  Radiation to the neck or chest wall- none    Prior breast biopsies or atypical hyperplasia - none     FH: none      Review of Systems   Constitutional: Negative.    HENT: Negative.    Eyes: Negative.    Respiratory: Negative.    Cardiovascular: Negative.    Gastrointestinal: Negative.         No reflux   Endocrine: Negative.    Genitourinary: Negative.    Musculoskeletal: Negative.    Skin: Negative.    Allergic/Immunologic: Negative.    Neurological: Negative.    Hematological: Negative.  Negative for adenopathy.   Psychiatric/Behavioral: Negative.        Breast: Pt denies any breast pain or nipple discharge. Pt noted a vague area of prominence May 2017 - not tender. Imaging in July was negative. Noted since that time the area has increased in size slightly and more prominent.  No prior trauma or bruising. No breast surgeries or abnormalities.    Current Outpatient Prescriptions   Medication Sig Dispense Refill    aspirin (ECOTRIN) 81 MG EC tablet Take 1 tablet by mouth Daily. 1 Tablet, Delayed Release (E.C.) Oral Every day      cilostazol (PLETAL) 50 MG Tab Take 1 tablet (50 mg total) by mouth 2 (two) times daily. 60 tablet 11    fenofibrate  (TRICOR) 54 MG tablet Take 1 tablet (54 mg total) by mouth once daily. 30 tablet 11    lidocaine-prilocaine (EMLA) cream       meloxicam (MOBIC) 15 MG tablet Take 1 tablet (15 mg total) by mouth once daily. 90 tablet 3    metoprolol succinate (TOPROL-XL) 25 MG 24 hr tablet Take 1 tablet (25 mg total) by mouth once daily. 90 tablet 3    pantoprazole (PROTONIX) 40 MG tablet Take 1 tablet (40 mg total) by mouth once daily. 30 tablet 6    pravastatin (PRAVACHOL) 40 MG tablet Take 1 tablet (40 mg total) by mouth once daily. 1 Tablet Oral Every day 90 tablet 3    ALPRAZolam (XANAX) 0.5 MG tablet Take 1 tablet (0.5 mg total) by mouth 3 (three) times daily. 90 tablet 2     No current facility-administered medications for this visit.        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     Social History Main Topics    Smoking status: Never Smoker    Smokeless tobacco: Never Used    Alcohol use No    Drug use: No    Sexual activity: Not on file     Other Topics Concern    Not on file     Social History Narrative    No narrative on file       There were no vitals filed for this visit.    Physical Exam   Constitutional: She is oriented to person, place, and time. She appears well-developed and  well-nourished.   HENT:   Head: Normocephalic and atraumatic.   Right Ear: External ear normal.   Left Ear: External ear normal.   Mouth/Throat: No oropharyngeal exudate.   Eyes: Conjunctivae and EOM are normal. Pupils are equal, round, and reactive to light. Right eye exhibits no discharge. Left eye exhibits no discharge. No scleral icterus.   Neck: Normal range of motion. Neck supple. No thyromegaly present.   Cardiovascular: Normal rate, regular rhythm and normal heart sounds.    Pulmonary/Chest: Effort normal and breath sounds normal. Right breast exhibits no inverted nipple, no mass, no nipple discharge, no skin change and no tenderness. Left breast exhibits mass. Left breast exhibits no inverted nipple, no nipple discharge, no skin change and no tenderness.       Abdominal: Soft. Bowel sounds are normal.   Musculoskeletal: Normal range of motion. She exhibits no edema.        Right shoulder: She exhibits no crepitus and normal strength.   Lymphadenopathy:        Head (right side): No submental, no submandibular, no tonsillar, no preauricular, no posterior auricular and no occipital adenopathy present.        Head (left side): No submental, no submandibular, no tonsillar, no preauricular, no posterior auricular and no occipital adenopathy present.     She has no cervical adenopathy.        Right cervical: No superficial cervical and no posterior cervical adenopathy present.       Left cervical: No superficial cervical and no posterior cervical adenopathy present.     She has no axillary adenopathy.        Right: No supraclavicular adenopathy present.        Left: No supraclavicular adenopathy present.   Neurological: She is alert and oriented to person, place, and time. She has normal reflexes.   Skin: Skin is warm and dry. No rash noted. No erythema. No pallor.   Psychiatric: She has a normal mood and affect. Her behavior is normal. Judgment and thought content normal.       Imaging:   Result:   Mammo Digital  Diagnostic Left with Tomosynthesis_CAD  US Breast Left Limited     History:  Patient is 72 y.o. and is seen for a diagnostic mammogram.     Films Compared:  07/26/2017 Mammo Digital Diagnostic Bilat with Tomosynthesis_CAD, 07/26/2017 US Breast Left Limited, and 03/16/2016 Mammo Digital Screening Bilat with CAD     Findings:  This procedure was performed using tomosynthesis.  Computer-aided detection was utilized in the interpretation of this examination.  Mammo Digital Diagnostic Left with Tomosynthesis_CAD  The breast has scattered areas of fibroglandular density.     There is a mass seen in the lower outer quadrant of the left breast.  This corresponds to a palpable lump.  An additional mass in the outer upper left breast is stable for over 2 years and therefore benign.     US Breast Left Limited  There is a new heterogenous solid mass seen in the left breast at 5 o'clock.  This measures  16 x 13 x 6 mm and contains solid and more cystic components. No shadowing is seen.     Impression:  Left  Mass: Left breast mass at the lower outer position. Assessment: 4 - Suspicious finding. Biopsy is recommended.      BI-RADS Category:   Left: 4 - Suspicious  Overall: 4 - Suspicious     Recommendation:  Biopsy is recommended.     The patient's estimated lifetime risk of breast cancer (to age 85) based on Tyrer-Cuzick - 7 risk assessment model is: Tyrer-Cuzick: 4.34 %. According to the American Cancer Society,  patients with a lifetime breast cancer risk of 20% or higher might benefit from supplemental screening tests.    Encounter     View Encounter            Pathology:  Low-grade fibrovascular proliferation, favor cavernous hemangioma.  Comment: Although the lack of significant cytologic atypia, mitotic activity or necrosis favors a benign hemangioma, the clinical impression of a mass lesion within the breast raises the suspicion of a well differentiated angiosarcoma.  Complete excision of the lesion is  recommended.    Assessment & Plan:    73-year-old female with biopsy favoring cavernous hemangioma but cannot rule out angiosarcoma    Left breast wire loc biopsy 4/25/18  Preop: CBC, BMP, EKG reviewed cardiology clearance, will need to hold Pletal prior to surgery  Risks and benefits discussed with patient including: Pain, bleeding, infection, no diagnosis, need for further procedure

## 2018-04-25 NOTE — DISCHARGE INSTRUCTIONS
General Information:    1. Do not drink alcoholic beverages including beer for 24 hours or as long as you are on pain medication..  2. Do not drive a motor vehicle, operate machinery or power tools, or signs legal papers for 24 hours or as long as you are on pain medication.   3. You may experience light-headedness, dizziness, and sleepiness following surgery. Please do not stay alone. A responsible adult should be with you for this 24 hour period.  4. Go home and rest.  5. Progress slowly to a normal diet unless instructed.  Otherwise, begin with liquids such as soft drinks, then soup and crackers working up to solid foods. Drink plenty of nonalcoholic fluids.  6. Certain anesthetics and pain medications produce nausea and vomiting in certain individuals. If nausea becomes a problem at home, call you doctor.  7. A nurse will be calling you sometime after surgery. Do not be alarmed. This is our way of finding out how you are doing.  8. Several times every hour while you are awake, take 2-3 deep breaths and cough. If you had stomach surgery hold a pillow or rolled towel firmly against your stomach before you cough. This will help with any pain the cough might cause.  9. Several times every hour while you are awake, pump and flex your feet 5-6 times and do foot circles. This will help prevent blood clots.  10. Call your doctor for severe pain, bleeding, fever, or signs or symptoms of infection (pain, swelling, redness, foul odor, drainage).  11. You can contact your doctor anytime by [phone] for the [facility] (at [facility]) or [phone] for the [facility] on [facility].   [email or web address] is another way to contact your doctor if you are an active participant online with [facility].  12. Continue Nozin provided at discharge twice daily for 7 days or until the incision is healed.  See pamphlet or box for instructions.

## 2018-04-25 NOTE — TRANSFER OF CARE
"Anesthesia Transfer of Care Note    Patient: Renee Arteaga    Procedure(s) Performed: Procedure(s) (LRB):  BIOPSY-BREAST (Left)    Patient location: PACU    Anesthesia Type: general    Transport from OR: Transported from OR on room air with adequate spontaneous ventilation    Post pain: adequate analgesia    Post assessment: no apparent anesthetic complications    Post vital signs: stable    Level of consciousness: awake, alert and oriented    Nausea/Vomiting: no nausea/vomiting    Complications: none    Transfer of care protocol was followed      Last vitals:   Visit Vitals  BP (!) 153/67 (BP Location: Right arm, Patient Position: Sitting)   Pulse (!) 54   Temp 36.3 °C (97.3 °F) (Tympanic)   Resp 18   Ht 5' 7" (1.702 m)   Wt 64.8 kg (142 lb 13.7 oz)   SpO2 99%   Breastfeeding? No   BMI 22.37 kg/m²     "

## 2018-04-25 NOTE — PROGRESS NOTES
Patient denies surgical pain but complains of mid chest pain, non radiating.  No history of same.  Dr. Arias, Anesthesologist, aware.  EKG ordered.

## 2018-04-26 NOTE — ANESTHESIA POSTPROCEDURE EVALUATION
"Anesthesia Post Evaluation    Patient: Renee Arteaga    Procedure(s) Performed: Procedure(s) (LRB):  BIOPSY-BREAST (Left)    Final Anesthesia Type: general  Patient location during evaluation: PACU  Patient participation: Yes- Able to Participate  Level of consciousness: awake and alert  Post-procedure vital signs: reviewed and stable  Pain management: adequate  Airway patency: patent  PONV status at discharge: No PONV  Anesthetic complications: no      Cardiovascular status: blood pressure returned to baseline  Respiratory status: unassisted and spontaneous ventilation  Hydration status: euvolemic  Follow-up not needed.        Visit Vitals  /62   Pulse 74   Temp 36.5 °C (97.7 °F) (Temporal)   Resp 13   Ht 5' 7" (1.702 m)   Wt 64.8 kg (142 lb 13.7 oz)   SpO2 100%   Breastfeeding? No   BMI 22.37 kg/m²       Pain/Jose Score: Pain Assessment Performed: Yes (4/25/2018 12:15 PM)  Presence of Pain: denies (4/25/2018 12:15 PM)  Jose Score: 10 (4/25/2018 11:45 AM)      "

## 2018-04-26 NOTE — BRIEF OP NOTE
[facility]  Brief Operative Note     SUMMARY     Surgery Date: 4/25/2018     Surgeon(s) and Role:     * Damaso Berumen MD - Primary    Assisting Surgeon: None    Pre-op Diagnosis:  Abnormal mammogram of left breast [R92.8]    Post-op Diagnosis:  Post-Op Diagnosis Codes:     * Abnormal mammogram of left breast [R92.8]    Procedure(s) (LRB):  BIOPSY-BREAST (Left)    Anesthesia: General    Description of the findings of the procedure: Left breast excisional breast biopsy    Findings/Key Components: See op note    Estimated Blood Loss: * No values recorded between 4/25/2018 10:41 AM and 4/25/2018 11:21 AM *         Specimens:   Specimen (12h ago through future)    Start     Ordered    04/25/18 1137  Specimen to Pathology - Surgery  Once     Comments:  Left breast bxDx: Suspicious breast lesion      04/25/18 1139          Discharge Note    SUMMARY     Admit Date: 4/25/2018    Discharge Date and Time: 4/25/2018 12:43 PM    Hospital Course the patient underwent breast biopsy and was discharged postoperatively    Final Diagnosis: Post-Op Diagnosis Codes:     * Abnormal mammogram of left breast [R92.8]    Disposition: Home or Self Care    Follow Up/Patient Instructions:     Medications:  Reconciled Home Medications:      Medication List      START taking these medications    hydrocodone-acetaminophen 5-325mg 5-325 mg per tablet  Commonly known as:  NORCO  Take 1 tablet by mouth every 4 (four) hours as needed for Pain.        CONTINUE taking these medications    ALPRAZolam 0.5 MG tablet  Commonly known as:  XANAX  Take 1 tablet (0.5 mg total) by mouth 3 (three) times daily.     aspirin 81 MG EC tablet  Commonly known as:  ECOTRIN  Take 1 tablet by mouth Daily. 1 Tablet, Delayed Release (E.C.) Oral Every day     cilostazol 50 MG Tab  Commonly known as:  PLETAL  Take 1 tablet (50 mg total) by mouth 2 (two) times daily.     fenofibrate 54 MG tablet  Commonly known as:  TRICOR  Take 1 tablet (54 mg total) by mouth  once daily.     lidocaine-prilocaine cream  Commonly known as:  EMLA     meloxicam 15 MG tablet  Commonly known as:  MOBIC  Take 1 tablet (15 mg total) by mouth once daily.     metoprolol succinate 25 MG 24 hr tablet  Commonly known as:  TOPROL-XL  Take 1 tablet (25 mg total) by mouth once daily.     pantoprazole 40 MG tablet  Commonly known as:  PROTONIX  Take 1 tablet (40 mg total) by mouth once daily.     pravastatin 40 MG tablet  Commonly known as:  PRAVACHOL  Take 1 tablet (40 mg total) by mouth once daily. 1 Tablet Oral Every day            Discharge Procedure Orders  Diet general     Activity as tolerated     Shower on day dressing removed (No bath)     Call MD for:  temperature >100.4     Call MD for:  persistent nausea and vomiting     Call MD for:  severe uncontrolled pain     Call MD for:  difficulty breathing, headache or visual disturbances     Call MD for:  redness, tenderness, or signs of infection (pain, swelling, redness, odor or green/yellow discharge around incision site)     Call MD for:  hives     Call MD for:  persistent dizziness or light-headedness     Call MD for:  extreme fatigue     Remove dressing in 48 hours       Follow-up Information     Damaso Berumen MD In 2 weeks.    Specialties:  General Surgery, Bariatrics  Contact information:  [address] [place] [address] DR Alessandra RAE [id number] [phone]

## 2018-04-26 NOTE — OP NOTE
[facility]  Surgery Department  Operative Note    SUMMARY     Date of Procedure: 4/25/2018     Procedure:    left breast wire localization excisional biopsy    Surgeon(s) and Role:     * Damaso Berumen MD - Primary    Assisting Surgeon: None    Pre-Operative Diagnosis: Abnormal mammogram of left breast [R92.8]    Post-Operative Diagnosis: Post-Op Diagnosis Codes:     * Abnormal mammogram of left breast [R92.8]    Anesthesia: General    Technical Procedures Used: Left breast wire loop excisional biopsy    Description of the Findings of the Procedure: Specimen confirmed on x-ray    Complications: No    Estimated Blood Loss (EBL): 5cc           Implants: * No implants in log *    Specimens:   Specimen (12h ago through future)    Start     Ordered    04/25/18 1137  Specimen to Pathology - Surgery  Once     Comments:  Left breast bxDx: Suspicious breast lesion      04/25/18 1139                  Condition: Good    Disposition: PACU - hemodynamically stable.      PROCEDURE IN DETAIL:   The patient underwent informed consent.  The wire was placed in the lower outer quadrant of the left breast. The wire localization films were reviewed.    She was then brought to the Operating Room and placed in the supine position. General anesthesia was administered.  The left breast, anterior chest, right arm and axilla were then prepped and draped in a sterile fashion.     We turned our attention to the left breast. Local anesthetic was injected in the area. An incision was made in the lower outer quadrant of the left breast over the anticipated tract of the wire. The specimen was dissected circumferentially around the lesion.  The wire localization lumpectomy specimen was marked using short stitch superior long stitch lateral.  It was then submitted for specimen radiograph, which confirmed the clip and area of interest within the specimen.     Within the lumpectomy cavity, hemostasis was achieved with cautery. The wound  was irrigated until clear. There was no evidence of bleeding. It was closed in multiple layers with deep dermal and subcutaneous interrupted Vicryl sutures and a running 4-0 Monocryl subcuticular skin closure.    Steri-Strips and sterile dressing were applied. Sterile fluff gauze was placed and a post-procedure bra was placed. She tolerated the procedure well without complication and was turned over to Anesthesia for transport to the recovery area in a satisfactory condition. All specimens were sent to Pathology for permanent sectioning.

## 2018-04-27 ENCOUNTER — TELEPHONE (OUTPATIENT)
Dept: RADIOLOGY | Facility: HOSPITAL | Age: 73
End: 2018-04-27

## 2018-04-27 ENCOUNTER — TELEPHONE (OUTPATIENT)
Dept: SURGERY | Facility: CLINIC | Age: 73
End: 2018-04-27

## 2018-04-27 RX ORDER — ALPRAZOLAM 0.5 MG/1
0.5 TABLET ORAL 3 TIMES DAILY
Qty: 90 TABLET | Refills: 0 | Status: SHIPPED | OUTPATIENT
Start: 2018-04-27 | End: 2018-06-14 | Stop reason: SDUPTHER

## 2018-04-27 NOTE — TELEPHONE ENCOUNTER
"Patient called stated,"My face is red, swelling in face, and around nose, it started when I took the first pain pill, and I took Benadryl but It did not help." patient denies any difficulty breathing at this time. Informed pt Dr. Berumen is not in clinic today,adivsed pt to stop taking the pain medication now, and to go to the nearest hospital, so they can eval and treat her. Patient verbalized an understanding.  "

## 2018-05-01 ENCOUNTER — TELEPHONE (OUTPATIENT)
Dept: RADIOLOGY | Facility: HOSPITAL | Age: 73
End: 2018-05-01

## 2018-05-01 VITALS
BODY MASS INDEX: 22.43 KG/M2 | RESPIRATION RATE: 13 BRPM | TEMPERATURE: 98 F | HEIGHT: 67 IN | DIASTOLIC BLOOD PRESSURE: 62 MMHG | SYSTOLIC BLOOD PRESSURE: 136 MMHG | WEIGHT: 142.88 LBS | OXYGEN SATURATION: 100 % | HEART RATE: 74 BPM

## 2018-05-02 ENCOUNTER — HOSPITAL ENCOUNTER (OUTPATIENT)
Dept: RADIOLOGY | Facility: HOSPITAL | Age: 73
Discharge: HOME | End: 2018-05-02
Attending: INTERNAL MEDICINE
Payer: MEDICARE

## 2018-05-02 DIAGNOSIS — I77.1 ARTERIAL STRICTURE: ICD-10-CM

## 2018-05-02 PROCEDURE — 74175 CTA ABDOMEN W/CONTRAST: CPT | Mod: 26,,, | Performed by: RADIOLOGY

## 2018-05-02 PROCEDURE — 25500020 PHARM REV CODE 255: Mod: PO | Performed by: INTERNAL MEDICINE

## 2018-05-02 PROCEDURE — 74175 CTA ABDOMEN W/CONTRAST: CPT | Mod: TC,PO

## 2018-05-02 RX ADMIN — IOHEXOL 100 ML: 350 INJECTION, SOLUTION INTRAVENOUS at 01:05

## 2018-05-03 ENCOUNTER — TELEPHONE (OUTPATIENT)
Dept: SURGERY | Facility: CLINIC | Age: 73
End: 2018-05-03

## 2018-05-03 NOTE — TELEPHONE ENCOUNTER
----- Message from Alicia Billy sent at 5/3/2018 10:19 AM CDT -----  Contact: pt  Pt returning nurse call, please call pt @ [phone].

## 2018-05-04 ENCOUNTER — TELEPHONE (OUTPATIENT)
Dept: CARDIOLOGY | Facility: CLINIC | Age: 73
End: 2018-05-04

## 2018-05-04 NOTE — TELEPHONE ENCOUNTER
----- Message from Codi Gresham MD sent at 5/3/2018  3:44 PM CDT -----  no  ----- Message -----  From: Paris Lopez LPN  Sent: 5/3/2018   3:38 PM  To: Codi Gresham MD    Does patient need to be set up for angio?      ----- Message -----  From: Mavis Sanderson  Sent: 5/3/2018   3:34 PM  To: Marga BEE Staff    ct scan results needed & discuss angiogram...[phone] (home)

## 2018-05-04 NOTE — TELEPHONE ENCOUNTER
----- Message from Codi Gresham MD sent at 5/3/2018  5:48 AM CDT -----  cta negative no reason to  ombolus to legs minmal blockage in aorta of no clinical significance

## 2018-05-04 NOTE — TELEPHONE ENCOUNTER
----- Message from Codi Gresham MD sent at 5/3/2018  3:44 PM CDT -----  No need for angio  ----- Message -----  From: Paris Lopez LPN  Sent: 5/3/2018   3:38 PM  To: Codi Gresham MD    Does patient need to be set up for angio?      ----- Message -----  From: Mavis Sanderson  Sent: 5/3/2018   3:34 PM  To: Marga BEE Staff    ct scan results needed & discuss angiogram...[phone] (home)

## 2018-05-08 ENCOUNTER — CLINICAL SUPPORT (OUTPATIENT)
Dept: CARDIOLOGY | Facility: CLINIC | Age: 73
End: 2018-05-08
Attending: INTERNAL MEDICINE
Payer: MEDICARE

## 2018-05-08 DIAGNOSIS — R00.2 PALPITATION: ICD-10-CM

## 2018-05-08 PROCEDURE — 93224 XTRNL ECG REC UP TO 48 HRS: CPT | Mod: S$GLB,,, | Performed by: INTERNAL MEDICINE

## 2018-05-09 ENCOUNTER — OFFICE VISIT (OUTPATIENT)
Dept: SURGERY | Facility: CLINIC | Age: 73
End: 2018-05-09
Payer: MEDICARE

## 2018-05-09 VITALS
SYSTOLIC BLOOD PRESSURE: 125 MMHG | DIASTOLIC BLOOD PRESSURE: 67 MMHG | BODY MASS INDEX: 22.89 KG/M2 | TEMPERATURE: 98 F | HEART RATE: 61 BPM | WEIGHT: 146.19 LBS

## 2018-05-09 DIAGNOSIS — Z98.890 POST-OPERATIVE STATE: Primary | ICD-10-CM

## 2018-05-09 PROCEDURE — 99024 POSTOP FOLLOW-UP VISIT: CPT | Mod: S$GLB,,, | Performed by: PHYSICIAN ASSISTANT

## 2018-05-09 PROCEDURE — 99999 PR PBB SHADOW E&M-EST. PATIENT-LVL III: CPT | Mod: PBBFAC,,, | Performed by: PHYSICIAN ASSISTANT

## 2018-05-09 NOTE — PROGRESS NOTES
Renee Arteaga is status post left breast biopsy and presents today for follow-up care.  She has no complaints    PE:Incisions clean, dry, and intact.    Pathology report was reviewed with the patient, which showed   FINAL PATHOLOGIC DIAGNOSIS  Left breast biopsy:  Vascular tumor favoring large-vessel hemangioma; see note..  Supplemental Diagnosis  Intradepartmental consensus is benign venous hemangioma.    A/P:  Normal post-operative course.    Resume yearly screening mammo Return to clinic as needed.

## 2018-05-11 ENCOUNTER — TELEPHONE (OUTPATIENT)
Dept: CARDIOLOGY | Facility: CLINIC | Age: 73
End: 2018-05-11

## 2018-05-11 NOTE — TELEPHONE ENCOUNTER
----- Message from Codi Gresham MD sent at 5/11/2018  8:39 AM CDT -----  there are few skipped beats nothing alarming or anything significant to worry about

## 2018-05-13 ENCOUNTER — NURSE TRIAGE (OUTPATIENT)
Dept: ADMINISTRATIVE | Facility: CLINIC | Age: 73
End: 2018-05-13

## 2018-05-13 RX ORDER — SULFAMETHOXAZOLE AND TRIMETHOPRIM 800; 160 MG/1; MG/1
1 TABLET ORAL 2 TIMES DAILY
Qty: 14 TABLET | Refills: 0 | Status: SHIPPED | OUTPATIENT
Start: 2018-05-13 | End: 2018-05-20

## 2018-05-13 NOTE — TELEPHONE ENCOUNTER
Reason for Disposition   [1] Pus or bad-smelling fluid draining from incision AND [2] no fever    Protocols used: ST POST-OP INCISION SYMPTOMS-A-AH

## 2018-05-14 ENCOUNTER — OFFICE VISIT (OUTPATIENT)
Dept: SURGERY | Facility: CLINIC | Age: 73
End: 2018-05-14
Payer: MEDICARE

## 2018-05-14 ENCOUNTER — TELEPHONE (OUTPATIENT)
Dept: SURGERY | Facility: CLINIC | Age: 73
End: 2018-05-14

## 2018-05-14 VITALS
HEART RATE: 63 BPM | SYSTOLIC BLOOD PRESSURE: 118 MMHG | DIASTOLIC BLOOD PRESSURE: 56 MMHG | BODY MASS INDEX: 22.77 KG/M2 | WEIGHT: 145.06 LBS | HEIGHT: 67 IN | TEMPERATURE: 98 F

## 2018-05-14 DIAGNOSIS — Z51.89 VISIT FOR WOUND CHECK: Primary | ICD-10-CM

## 2018-05-14 PROCEDURE — 99999 PR PBB SHADOW E&M-EST. PATIENT-LVL III: CPT | Mod: PBBFAC,,, | Performed by: SURGERY

## 2018-05-14 PROCEDURE — 99024 POSTOP FOLLOW-UP VISIT: CPT | Mod: S$GLB,,, | Performed by: SURGERY

## 2018-05-14 NOTE — TELEPHONE ENCOUNTER
Incision is draining a lot and was put on antibiotics over the weekend. She states that it looks bad and it is very red.    Please advise. She feels like she needs to be seen today and would like to only see you.

## 2018-05-14 NOTE — TELEPHONE ENCOUNTER
----- Message from Terrell Kasper sent at 5/14/2018  7:12 AM CDT -----  Contact: pt  She's calling in regard to her incision, pt states it is irritated, [phone] (home)

## 2018-05-14 NOTE — PROGRESS NOTES
Subjective:       Patient ID: Renee Arteaga is a 73 y.o. female.    Chief Complaint: Post-op Evaluation    HPI   S/p left breast breast excisional biopsy 4/25/18 presents for wound check. She reports redness and drainage that started over the weekend, no fevers or chills. She was seen in clinic last week with no wound issues.  Review of Systems    Objective:      Physical Exam   Constitutional: She appears well-nourished. No distress.   Pulmonary/Chest:       Vitals reviewed.      Supplemental Diagnosis  Intradepartmental consensus is benign venous hemangioma.  Assessment:    s/p left breast excisional biopsy  Plan:       Wound dehiscence - wet to dry packing  F/u in 1 week

## 2018-05-16 ENCOUNTER — TELEPHONE (OUTPATIENT)
Dept: SURGERY | Facility: CLINIC | Age: 73
End: 2018-05-16

## 2018-05-16 NOTE — TELEPHONE ENCOUNTER
----- Message from Terrell Kasper sent at 5/16/2018  9:36 AM CDT -----  Contact: pt  She's calling in regards to her in incision, pls advise, [phone] (home)

## 2018-05-16 NOTE — TELEPHONE ENCOUNTER
Patient has no help with being able to pack the wound. 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 She has been keeping it covered with gauze the best she can but with it being under her breast it is hard.       Please advise what you would like to do

## 2018-05-17 ENCOUNTER — TELEPHONE (OUTPATIENT)
Dept: SURGERY | Facility: CLINIC | Age: 73
End: 2018-05-17

## 2018-05-17 NOTE — TELEPHONE ENCOUNTER
Patient states that she is going to cancel her appointment for tomorrow and keep the one on Monday. Her friend will do her wound care tomorrow

## 2018-05-17 NOTE — TELEPHONE ENCOUNTER
----- Message from Damaso Berumen MD sent at 5/17/2018  2:22 PM CDT -----  Contact: pt   That is fine. Thanks  ----- Message -----  From: Karishma Villavicencio MA  Sent: 5/17/2018  11:20 AM  To: Damaso Berumen MD, Jazlyn BEE. RAY Churchill    Please advise  ----- Message -----  From: Pameal Roth  Sent: 5/17/2018   8:07 AM  To: Kerline BEE Staff    Pt is requesting a call back from nurse in regards to canceling her appt because she has a friend that will do her wound care.                                                                           [phone] (home)

## 2018-05-21 ENCOUNTER — OFFICE VISIT (OUTPATIENT)
Dept: SURGERY | Facility: CLINIC | Age: 73
End: 2018-05-21
Payer: MEDICARE

## 2018-05-21 VITALS
HEART RATE: 67 BPM | SYSTOLIC BLOOD PRESSURE: 109 MMHG | WEIGHT: 145 LBS | TEMPERATURE: 98 F | DIASTOLIC BLOOD PRESSURE: 65 MMHG | BODY MASS INDEX: 22.71 KG/M2

## 2018-05-21 DIAGNOSIS — Z51.89 VISIT FOR WOUND CHECK: Primary | ICD-10-CM

## 2018-05-21 PROCEDURE — 99024 POSTOP FOLLOW-UP VISIT: CPT | Mod: S$GLB,,, | Performed by: SURGERY

## 2018-05-21 PROCEDURE — 99999 PR PBB SHADOW E&M-EST. PATIENT-LVL III: CPT | Mod: PBBFAC,,, | Performed by: SURGERY

## 2018-05-21 NOTE — PROGRESS NOTES
Subjective:       Patient ID: Renee Arteaga is a 73 y.o. female.    Chief Complaint: Follow-up (post-op recheck incision)    HPI   S/p left breast breast excisional biopsy 4/25/18 presents for wound check.   Denies any changes since last visit  Review of Systems    Objective:      Physical Exam   Constitutional: She appears well-nourished. No distress.   Pulmonary/Chest:       Vitals reviewed.      Supplemental Diagnosis  Intradepartmental consensus is benign venous hemangioma.  Assessment:    s/p left breast excisional biopsy  Plan:       Wound dehiscence - wet to dry packing daily  F/u in 2 weeks

## 2018-06-06 ENCOUNTER — OFFICE VISIT (OUTPATIENT)
Dept: SURGERY | Facility: CLINIC | Age: 73
End: 2018-06-06
Payer: MEDICARE

## 2018-06-06 DIAGNOSIS — Z51.89 VISIT FOR WOUND CHECK: Primary | ICD-10-CM

## 2018-06-06 PROCEDURE — 99999 PR PBB SHADOW E&M-EST. PATIENT-LVL II: CPT | Mod: PBBFAC,,, | Performed by: SURGERY

## 2018-06-06 PROCEDURE — 99024 POSTOP FOLLOW-UP VISIT: CPT | Mod: S$GLB,,, | Performed by: SURGERY

## 2018-06-07 DIAGNOSIS — K21.9 GASTROESOPHAGEAL REFLUX DISEASE WITHOUT ESOPHAGITIS: ICD-10-CM

## 2018-06-07 RX ORDER — PANTOPRAZOLE SODIUM 40 MG/1
40 TABLET, DELAYED RELEASE ORAL DAILY
Qty: 30 TABLET | Refills: 6 | Status: SHIPPED | OUTPATIENT
Start: 2018-06-07 | End: 2018-12-13 | Stop reason: SDUPTHER

## 2018-06-07 NOTE — PROGRESS NOTES
Subjective:       Patient ID: Renee Arteaga is a 73 y.o. female.    Chief Complaint: Post-op Problem     HPI   S/p left breast breast excisional biopsy 4/25/18 presents for wound check.   Patient reports continued drainage and does not feel like hole as gotten smaller  Review of Systems    Objective:      Physical Exam   Constitutional: She appears well-nourished. No distress.   Pulmonary/Chest:       Vitals reviewed.      Supplemental Diagnosis  Intradepartmental consensus is benign venous hemangioma.  Assessment:    s/p left breast excisional biopsy  Plan:       Wound dehiscence - wet to dry packing daily  Silver nitrate applied today  F/u in 2 weeks

## 2018-06-14 DIAGNOSIS — F41.9 ANXIETY: ICD-10-CM

## 2018-06-14 DIAGNOSIS — G47.00 INSOMNIA, UNSPECIFIED TYPE: ICD-10-CM

## 2018-06-14 RX ORDER — ALPRAZOLAM 0.5 MG/1
0.5 TABLET ORAL 3 TIMES DAILY
Qty: 90 TABLET | Refills: 0 | Status: SHIPPED | OUTPATIENT
Start: 2018-06-14 | End: 2018-07-12 | Stop reason: SDUPTHER

## 2018-06-20 ENCOUNTER — OFFICE VISIT (OUTPATIENT)
Dept: SURGERY | Facility: CLINIC | Age: 73
End: 2018-06-20
Payer: MEDICARE

## 2018-06-20 VITALS
HEIGHT: 67 IN | HEART RATE: 82 BPM | TEMPERATURE: 98 F | BODY MASS INDEX: 22.84 KG/M2 | WEIGHT: 145.5 LBS | DIASTOLIC BLOOD PRESSURE: 71 MMHG | SYSTOLIC BLOOD PRESSURE: 119 MMHG

## 2018-06-20 DIAGNOSIS — Z51.89 VISIT FOR WOUND CHECK: Primary | ICD-10-CM

## 2018-06-20 PROCEDURE — 99024 POSTOP FOLLOW-UP VISIT: CPT | Mod: S$GLB,,, | Performed by: SURGERY

## 2018-06-20 PROCEDURE — 99999 PR PBB SHADOW E&M-EST. PATIENT-LVL III: CPT | Mod: PBBFAC,,, | Performed by: SURGERY

## 2018-06-21 NOTE — PROGRESS NOTES
Subjective:       Patient ID: Renee Arteaga is a 73 y.o. female.    Chief Complaint: Post-op Problem     HPI   S/p left breast breast excisional biopsy 4/25/18 presents for wound check.   Patient reports level has decreased in size since the application of silver nitrate.  She is only covering not packing the wound anymore  Review of Systems    Objective:      Physical Exam   Constitutional: She appears well-nourished. No distress.   Pulmonary/Chest:       Vitals reviewed.      Supplemental Diagnosis  Intradepartmental consensus is benign venous hemangioma.  Assessment:    s/p left breast excisional biopsy  Plan:       Wound dehiscence - wet to dry packing daily  Silver nitrate applied today  F/u in 3 weeks, if it has not significantly healed at that time will consider closure and minor procedure room

## 2018-07-10 ENCOUNTER — TELEPHONE (OUTPATIENT)
Dept: SURGERY | Facility: CLINIC | Age: 73
End: 2018-07-10

## 2018-07-10 NOTE — TELEPHONE ENCOUNTER
----- Message from Rossy Pete sent at 7/10/2018  7:19 AM CDT -----  Contact: pt   States she cancelled her procedure with  that was sched on [facility]. Pt wants to resched and can be reached at [phone]//[email or web address]

## 2018-07-11 NOTE — TELEPHONE ENCOUNTER
----- Message from Arabella Johns sent at 7/11/2018  6:11 AM CDT -----  Contact: self  Patient states need to cancel sick unable to make 1pm appointment today.   Pt will call to reschedule.

## 2018-07-12 DIAGNOSIS — F41.9 ANXIETY: ICD-10-CM

## 2018-07-12 DIAGNOSIS — G47.00 INSOMNIA, UNSPECIFIED TYPE: ICD-10-CM

## 2018-07-12 RX ORDER — ALPRAZOLAM 0.5 MG/1
0.5 TABLET ORAL 3 TIMES DAILY
Qty: 90 TABLET | Refills: 0 | Status: SHIPPED | OUTPATIENT
Start: 2018-07-12 | End: 2018-08-13 | Stop reason: SDUPTHER

## 2018-07-12 NOTE — TELEPHONE ENCOUNTER
----- Message from Cassy Campos sent at 7/12/2018  4:24 PM CDT -----  Contact: Patient   1. What is the name of the medication you are requesting? Rx Xanax  2. What is the dose? .05 mg  3. How do you take the medication? Orally, topically, etc? oral  4. How often do you take this medication? Three time a days  5. Do you need a 30 day or 90 day supply? 90 days  6. How many refills are you requesting? 3  7. What is your preferred pharmacy and location of the pharmacy?   [facility] [address]  [address]  [place] [address]  Phone: [phone] Fax: [phone]  8. Who can we contact with further questions? Patient/[phone]

## 2018-07-12 NOTE — TELEPHONE ENCOUNTER
----- Message from Kaur Vail sent at 7/12/2018 10:35 AM CDT -----  Contact: pt  The pt states she needs a refill on Xanax, the pt can be reached at [phone] ///[email or web address]

## 2018-08-13 ENCOUNTER — TELEPHONE (OUTPATIENT)
Dept: INTERNAL MEDICINE | Facility: CLINIC | Age: 73
End: 2018-08-13

## 2018-08-13 DIAGNOSIS — F41.9 ANXIETY: ICD-10-CM

## 2018-08-13 DIAGNOSIS — G47.00 INSOMNIA, UNSPECIFIED TYPE: ICD-10-CM

## 2018-08-13 RX ORDER — ALPRAZOLAM 0.5 MG/1
0.5 TABLET ORAL 3 TIMES DAILY
Qty: 90 TABLET | Refills: 0 | Status: SHIPPED | OUTPATIENT
Start: 2018-08-13 | End: 2018-09-14 | Stop reason: SDUPTHER

## 2018-08-13 NOTE — TELEPHONE ENCOUNTER
----- Message from Caren Gates sent at 8/13/2018  9:18 AM CDT -----  Contact: pt  Pt stated she's calling for a refill on medication and can be reached at [phone] Thanks     1. What is the name of the medication you are requesting? zinac  2. What is the dose? .05  3. How do you take the medication? Orally, topically, etc? orally  4. How often do you take this medication? 3 a day   5. Do you need a 30 day or 90 day supply? 90  6. How many refills are you requesting?   7. What is your preferred pharmacy and location of the pharmacy?   8. Who can we contact with further questions?         [facility] - [place], [place] - [address]  [address] [facility] [address]  Phone: [phone] Fax: [phone]

## 2018-08-30 ENCOUNTER — TELEPHONE (OUTPATIENT)
Dept: INTERNAL MEDICINE | Facility: CLINIC | Age: 73
End: 2018-08-30

## 2018-08-30 DIAGNOSIS — M16.11 ARTHRITIS OF RIGHT HIP: ICD-10-CM

## 2018-08-30 DIAGNOSIS — R00.2 HEART PALPITATIONS: ICD-10-CM

## 2018-08-30 DIAGNOSIS — M47.817 LUMBOSACRAL SPONDYLOSIS WITHOUT MYELOPATHY: ICD-10-CM

## 2018-08-30 DIAGNOSIS — E78.1 HYPERTRIGLYCERIDEMIA: ICD-10-CM

## 2018-08-30 DIAGNOSIS — K21.9 GASTROESOPHAGEAL REFLUX DISEASE WITHOUT ESOPHAGITIS: ICD-10-CM

## 2018-08-30 DIAGNOSIS — M16.12 ARTHRITIS OF LEFT HIP: ICD-10-CM

## 2018-08-30 RX ORDER — PRAVASTATIN SODIUM 40 MG/1
40 TABLET ORAL DAILY
Qty: 90 TABLET | Refills: 3 | Status: CANCELLED | OUTPATIENT
Start: 2018-08-30

## 2018-08-30 RX ORDER — MELOXICAM 15 MG/1
15 TABLET ORAL DAILY
Qty: 90 TABLET | Refills: 3 | Status: CANCELLED | OUTPATIENT
Start: 2018-08-30

## 2018-08-30 RX ORDER — METOPROLOL SUCCINATE 25 MG/1
25 TABLET, EXTENDED RELEASE ORAL DAILY
Qty: 90 TABLET | Refills: 3 | Status: CANCELLED | OUTPATIENT
Start: 2018-08-30

## 2018-08-30 RX ORDER — FENOFIBRATE 54 MG/1
54 TABLET ORAL DAILY
Qty: 30 TABLET | Refills: 11 | Status: CANCELLED | OUTPATIENT
Start: 2018-08-30

## 2018-08-30 RX ORDER — PANTOPRAZOLE SODIUM 40 MG/1
40 TABLET, DELAYED RELEASE ORAL DAILY
Qty: 30 TABLET | Refills: 6 | Status: CANCELLED | OUTPATIENT
Start: 2018-08-30

## 2018-08-30 NOTE — TELEPHONE ENCOUNTER
----- Message from Rossy Pete sent at 8/30/2018  7:44 AM CDT -----  Contact: Pt   States she went to [facility] for a knot on the ride side and says the nurse / Dr can call to get the reports and would like to have them looked over before coming in /pt can be reached at [phone]/ pt has an appt with Dr Reddy on tomorrow. Pt requested the date//thanks/dbw

## 2018-08-30 NOTE — TELEPHONE ENCOUNTER
Spoke to pt and she reports that she went to the ED last night at [place]. Reports she went to the chiropractor yesterday for her back and the knot was not there. When she got home it was noticeable and was hurting. The pain got worse during the night last night and she went to the ED at [place], several tests was done but nothing was found.  Pt is scheduled to see Dr Reddy tomorrow 08/31/18 for hosp f/u. I have sent a JOSEPH for pts records from [place] to review prior to appt.   Advised pt of appt and request for her records. Pt verbalized understanding.

## 2018-08-31 ENCOUNTER — OFFICE VISIT (OUTPATIENT)
Dept: INTERNAL MEDICINE | Facility: CLINIC | Age: 73
End: 2018-08-31
Payer: MEDICARE

## 2018-08-31 VITALS
HEIGHT: 66 IN | TEMPERATURE: 99 F | SYSTOLIC BLOOD PRESSURE: 100 MMHG | OXYGEN SATURATION: 99 % | WEIGHT: 145.31 LBS | HEART RATE: 64 BPM | DIASTOLIC BLOOD PRESSURE: 56 MMHG | RESPIRATION RATE: 20 BRPM | BODY MASS INDEX: 23.35 KG/M2

## 2018-08-31 DIAGNOSIS — R19.00 ABDOMINAL WALL BULGE: Primary | ICD-10-CM

## 2018-08-31 PROCEDURE — 99213 OFFICE O/P EST LOW 20 MIN: CPT | Mod: S$GLB,,, | Performed by: FAMILY MEDICINE

## 2018-08-31 PROCEDURE — 99999 PR PBB SHADOW E&M-EST. PATIENT-LVL V: CPT | Mod: PBBFAC,,, | Performed by: FAMILY MEDICINE

## 2018-08-31 RX ORDER — CIPROFLOXACIN 250 MG/1
500 TABLET, FILM COATED ORAL
COMMUNITY
Start: 2018-08-29 | End: 2018-09-08

## 2018-08-31 RX ORDER — HYDROCODONE BITARTRATE AND ACETAMINOPHEN 5; 325 MG/1; MG/1
1 TABLET ORAL
COMMUNITY
Start: 2018-08-29 | End: 2018-09-08

## 2018-08-31 NOTE — PROGRESS NOTES
Subjective:       Patient ID: Renee Arteaga is a 73 y.o. female.    Chief Complaint: Hospital Follow Up    HPI     74 yo F    Hospital Follow up    Saw a Chiropractor visit - Wed Morning  Upon return home noted a Knot on her abdomen - R side.  Some pain - no severe.  Hurts more at night when shifts from side to side.    Went to [facility] ER.    CT obtained:    Result Date: 8/29/2018  CT Ab/ Pel w/out contrast:   No calcification is identified within either kidney, either ureter or the urinary bladder. No hydronephrosis or ureteral dilatation is identified. Calcified pelvic phleboliths are present. Visualized lung bases appear unremarkable. Non-IV contrast images of the liver and spleen appear unremarkable. The colonic and appendiceal walls display fat density. The appendix is 8 mm in diameter. No evidence of appendiceal inflammation. The appendix is retrocecal in location. There is sigmoid colon diverticulosis. No significant adenopathy is identified. The uterus is not identified. Moderate multilevel findings of lumbar spondylosis are present. The left hip displays severe articular cartilage loss. IMPRESSION: 1. No  stone, ureteral obstruction or other acute finding is identified. 2. The appendix is demonstrated and appears normal, being retrocecal in location. 3. Sigmoid colon diverticulosis. 4. Hysterectomy. 5. Chronic skeletal findings. Electronically signed by Rayshawn Troy MD on 8/29/2018 6:14 PM      Knot in abdomen does not change in size  Feels constant.    Patient has trouble with constipation - chronically -   Passing wind without problems.  No change in bowel status.      Patient notes the bulge resolves when she lies flat.          Review of Systems   Constitutional: Negative for chills and fever.   HENT: Negative for congestion, sinus pressure and voice change.    Eyes: Negative for pain and visual disturbance.   Respiratory: Negative for shortness of breath and wheezing.    Cardiovascular: Negative  for chest pain and palpitations.   Gastrointestinal: Positive for constipation. Negative for diarrhea.   Genitourinary: Negative for difficulty urinating and dysuria.   Musculoskeletal: Negative for gait problem and myalgias.   Skin: Negative for pallor and rash.   Neurological: Negative for dizziness and light-headedness.   Psychiatric/Behavioral: Negative for confusion and dysphoric mood.       Objective:       Vitals:    08/31/18 1026   BP: (!) 100/56   Pulse: 108   Resp: 20   Temp: 98.5 °F (36.9 °C)       Physical Exam   Constitutional: She is oriented to person, place, and time. She appears well-developed and well-nourished. She does not have a sickly appearance. No distress.   HENT:   Head: Normocephalic and atraumatic.   Right Ear: External ear normal.   Left Ear: External ear normal.   Eyes: Conjunctivae, EOM and lids are normal.   Neck: Trachea normal, normal range of motion and full passive range of motion without pain.   Pulmonary/Chest: Effort normal and breath sounds normal. No stridor. No respiratory distress.   Abdominal: Soft. Normal appearance and bowel sounds are normal. She exhibits no distension. There is no tenderness. There is no guarding. No hernia.   Bulging mass on R L Q of abdomen.  Seems to resolve when lying flat on her back.  Some tenderness to palpation   Musculoskeletal: Normal range of motion.   Lymphadenopathy:     She has no cervical adenopathy.   Neurological: She is alert and oriented to person, place, and time. She is not disoriented.   Skin: Skin is warm, dry and intact. No rash noted. She is not diaphoretic.   Psychiatric: She has a normal mood and affect. Her speech is normal and behavior is normal. Thought content normal.       Assessment:       1. Abdominal wall bulge        Plan:   Abdominal wall bulge    Suspected hernia  Not strangulated  Will consult General surgery for further evaluation / mgnt.  Will obtain US for further imaging.  -     US Abdomen Limited; Future;  Expected date: 08/31/2018  -     Ambulatory consult to General Surgery    Advising not to take Norco unless really needed.        No Follow-up on file.

## 2018-09-10 DIAGNOSIS — E78.1 HYPERTRIGLYCERIDEMIA: ICD-10-CM

## 2018-09-12 ENCOUNTER — TELEPHONE (OUTPATIENT)
Dept: RADIOLOGY | Facility: HOSPITAL | Age: 73
End: 2018-09-12

## 2018-09-12 ENCOUNTER — OFFICE VISIT (OUTPATIENT)
Dept: SURGERY | Facility: CLINIC | Age: 73
End: 2018-09-12
Payer: MEDICARE

## 2018-09-12 VITALS
DIASTOLIC BLOOD PRESSURE: 59 MMHG | HEART RATE: 57 BPM | BODY MASS INDEX: 22.64 KG/M2 | WEIGHT: 140.88 LBS | TEMPERATURE: 98 F | SYSTOLIC BLOOD PRESSURE: 114 MMHG | HEIGHT: 66 IN

## 2018-09-12 DIAGNOSIS — R19.00 ABDOMINAL WALL BULGE: Primary | ICD-10-CM

## 2018-09-12 PROCEDURE — 99999 PR PBB SHADOW E&M-EST. PATIENT-LVL III: CPT | Mod: PBBFAC,,, | Performed by: SURGERY

## 2018-09-12 PROCEDURE — 1101F PT FALLS ASSESS-DOCD LE1/YR: CPT | Mod: CPTII,,, | Performed by: SURGERY

## 2018-09-12 PROCEDURE — 99213 OFFICE O/P EST LOW 20 MIN: CPT | Mod: PBBFAC,PO | Performed by: SURGERY

## 2018-09-12 PROCEDURE — 99213 OFFICE O/P EST LOW 20 MIN: CPT | Mod: S$PBB,,, | Performed by: SURGERY

## 2018-09-12 RX ORDER — FENOFIBRATE 54 MG/1
54 TABLET ORAL DAILY
Qty: 30 TABLET | Refills: 0 | Status: SHIPPED | OUTPATIENT
Start: 2018-09-12 | End: 2018-09-14 | Stop reason: SDUPTHER

## 2018-09-12 NOTE — PROGRESS NOTES
Subjective:       Patient ID: Renee Arteaga is a 73 y.o. female.    Chief Complaint: Post-op Problem     HPI   71-year-old female previously s/p left breast breast excisional biopsy presents for abdominal wall bulge.  She reports seeing a chiropractor and undergone and adjustment recently afterwards noting a bulge on her right side.  It sticks out more when she is standing up and goes flat when she lays down.  She denies any significant pain associated with it.  Current Outpatient Medications on File Prior to Visit   Medication Sig Dispense Refill    ALPRAZolam (XANAX) 0.5 MG tablet Take 1 tablet (0.5 mg total) by mouth 3 (three) times daily. 90 tablet 0    aspirin (ECOTRIN) 81 MG EC tablet Take 1 tablet by mouth Daily. 1 Tablet, Delayed Release (E.C.) Oral Every day      cilostazol (PLETAL) 50 MG Tab Take 1 tablet (50 mg total) by mouth 2 (two) times daily. 60 tablet 11    fenofibrate (TRICOR) 54 MG tablet Take 1 tablet (54 mg total) by mouth once daily. 30 tablet 11    lidocaine-prilocaine (EMLA) cream       meloxicam (MOBIC) 15 MG tablet Take 1 tablet (15 mg total) by mouth once daily. 90 tablet 3    metoprolol succinate (TOPROL-XL) 25 MG 24 hr tablet Take 1 tablet (25 mg total) by mouth once daily. 90 tablet 3    pantoprazole (PROTONIX) 40 MG tablet Take 1 tablet (40 mg total) by mouth once daily. 30 tablet 6    pravastatin (PRAVACHOL) 40 MG tablet Take 1 tablet (40 mg total) by mouth once daily. 1 Tablet Oral Every day 90 tablet 3     No current facility-administered medications on file prior to visit.      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   Respiratory: Negative for cough, shortness of breath, wheezing and stridor.    Cardiovascular: Negative for chest pain, palpitations and leg swelling.   Gastrointestinal: Negative for abdominal distention, abdominal pain, constipation, diarrhea, nausea and vomiting.   Genitourinary: Negative for difficulty urinating, dysuria, frequency, hematuria and urgency.   Skin: Negative for color change, pallor, rash and wound.   Hematological: Does not bruise/bleed easily.       Objective:      Physical Exam   Constitutional: She is oriented to person, place, and time. She appears well-developed and well-nourished. No distress.   HENT:   Head: Normocephalic and atraumatic.   Eyes: EOM are normal.   Neck: Neck supple.   Cardiovascular: Normal rate and regular rhythm.   Pulmonary/Chest: Effort normal and breath sounds normal.   Abdominal: Soft. Bowel sounds are normal. She exhibits no distension and no mass. There is no tenderness. No hernia.   No hernia or masses palpable  Right-sided abdomen more prominent due to patient's positioning she is standing with her right hip elevated higher than her left hip which is likely accounting for the bulge she notices of her left abdominal wall as there is no palpable hernias or masses.   Neurological: She is alert and oriented to person, place, and time.   Skin: Skin is warm and dry.   Vitals reviewed.      Outside CT reviewed no significant rhonchi findings to account for right abdominal wall bulge  Assessment:   73-year-old female with right-sided abdominal wall bulge  Plan:       No hernia or masses  Likely secondary to musculoskeletal positioning  Follow up with her chiropractor or Orthopedics

## 2018-09-12 NOTE — LETTER
September 12, 2018      Carlos Reddy MD  [address]  [place] [address]           [facility]  [address] [place] [address]  Phone: [phone]  Fax: [phone]          Patient: Renee Arteaga   MR Number: [id number]   YOB: 1945   Date of Visit: 9/12/2018       Dear Dr. Carlos Reddy:    Thank you for referring Renee Arteaga to me for evaluation. Attached you will find relevant portions of my assessment and plan of care.    If you have questions, please do not hesitate to call me. I look forward to following Renee Arteaga along with you.    Sincerely,    Damaso Berumen MD    Enclosure  CC:  No Recipients    If you would like to receive this communication electronically, please contact [email or web address] or [phone] to request more information on [employer] Link access.    For providers and/or their staff who would like to refer a patient to [facility], please contact us through our one-stop-shop provider referral line, [facility], at [phone].    If you feel you have received this communication in error or would no longer like to receive these types of communications, please e-mail [email or web address]

## 2018-09-14 DIAGNOSIS — Z79.899 MEDICATION MANAGEMENT: Primary | ICD-10-CM

## 2018-09-14 DIAGNOSIS — F41.9 ANXIETY: ICD-10-CM

## 2018-09-14 DIAGNOSIS — G47.00 INSOMNIA, UNSPECIFIED TYPE: ICD-10-CM

## 2018-09-14 DIAGNOSIS — E78.1 HYPERTRIGLYCERIDEMIA: ICD-10-CM

## 2018-09-14 DIAGNOSIS — R79.9 ABNORMAL FINDING OF BLOOD CHEMISTRY: ICD-10-CM

## 2018-09-14 DIAGNOSIS — R53.83 OTHER FATIGUE: ICD-10-CM

## 2018-09-14 RX ORDER — FENOFIBRATE 54 MG/1
54 TABLET ORAL DAILY
Qty: 90 TABLET | Refills: 0 | Status: SHIPPED | OUTPATIENT
Start: 2018-09-14 | End: 2018-12-28 | Stop reason: SDUPTHER

## 2018-09-14 RX ORDER — ALPRAZOLAM 0.5 MG/1
0.5 TABLET ORAL 3 TIMES DAILY
Qty: 90 TABLET | Refills: 0 | Status: SHIPPED | OUTPATIENT
Start: 2018-09-14 | End: 2018-10-03 | Stop reason: SDUPTHER

## 2018-09-14 NOTE — TELEPHONE ENCOUNTER
Fasting labs scheduled for Tuesday morning. She would like to know if you will also check her iron? She has been so fatigued lately.

## 2018-09-14 NOTE — TELEPHONE ENCOUNTER
Need blood work it has been ordered.   Will need this in order to refill medications again. Fasting labs

## 2018-10-03 ENCOUNTER — OFFICE VISIT (OUTPATIENT)
Dept: INTERNAL MEDICINE | Facility: CLINIC | Age: 73
End: 2018-10-03
Payer: MEDICARE

## 2018-10-03 ENCOUNTER — LAB VISIT (OUTPATIENT)
Dept: LAB | Facility: HOSPITAL | Age: 73
End: 2018-10-03
Attending: FAMILY MEDICINE
Payer: MEDICARE

## 2018-10-03 VITALS
DIASTOLIC BLOOD PRESSURE: 78 MMHG | OXYGEN SATURATION: 98 % | SYSTOLIC BLOOD PRESSURE: 132 MMHG | TEMPERATURE: 99 F | WEIGHT: 142 LBS | BODY MASS INDEX: 22.82 KG/M2 | HEART RATE: 80 BPM | RESPIRATION RATE: 20 BRPM | HEIGHT: 66 IN

## 2018-10-03 DIAGNOSIS — R79.9 ABNORMAL FINDING OF BLOOD CHEMISTRY: ICD-10-CM

## 2018-10-03 DIAGNOSIS — R53.83 NO ENERGY: Primary | ICD-10-CM

## 2018-10-03 DIAGNOSIS — F41.9 ANXIETY: ICD-10-CM

## 2018-10-03 DIAGNOSIS — M60.80 OTHER MYOSITIS, UNSPECIFIED SITE: ICD-10-CM

## 2018-10-03 DIAGNOSIS — R53.83 OTHER FATIGUE: ICD-10-CM

## 2018-10-03 DIAGNOSIS — Z79.899 OTHER LONG TERM (CURRENT) DRUG THERAPY: ICD-10-CM

## 2018-10-03 DIAGNOSIS — G47.00 INSOMNIA, UNSPECIFIED TYPE: ICD-10-CM

## 2018-10-03 DIAGNOSIS — R53.83 NO ENERGY: ICD-10-CM

## 2018-10-03 DIAGNOSIS — Z79.899 MEDICATION MANAGEMENT: ICD-10-CM

## 2018-10-03 DIAGNOSIS — E78.1 HYPERTRIGLYCERIDEMIA: ICD-10-CM

## 2018-10-03 LAB
25(OH)D3+25(OH)D2 SERPL-MCNC: 16 NG/ML
ALBUMIN SERPL BCP-MCNC: 4 G/DL
ALP SERPL-CCNC: 59 U/L
ALT SERPL W/O P-5'-P-CCNC: 6 U/L
ANION GAP SERPL CALC-SCNC: 8 MMOL/L
AST SERPL-CCNC: 16 U/L
BASOPHILS # BLD AUTO: 0.05 K/UL
BASOPHILS NFR BLD: 0.8 %
BILIRUB SERPL-MCNC: 0.6 MG/DL
BUN SERPL-MCNC: 14 MG/DL
CALCIUM SERPL-MCNC: 9.8 MG/DL
CHLORIDE SERPL-SCNC: 101 MMOL/L
CHOLEST SERPL-MCNC: 216 MG/DL
CHOLEST/HDLC SERPL: 2.8 {RATIO}
CK SERPL-CCNC: 43 U/L
CO2 SERPL-SCNC: 24 MMOL/L
CREAT SERPL-MCNC: 1 MG/DL
DIFFERENTIAL METHOD: ABNORMAL
EOSINOPHIL # BLD AUTO: 0.2 K/UL
EOSINOPHIL NFR BLD: 3.4 %
ERYTHROCYTE [DISTWIDTH] IN BLOOD BY AUTOMATED COUNT: 12.7 %
EST. GFR  (AFRICAN AMERICAN): >60 ML/MIN/1.73 M^2
EST. GFR  (NON AFRICAN AMERICAN): 56.4 ML/MIN/1.73 M^2
GLUCOSE SERPL-MCNC: 94 MG/DL
HCT VFR BLD AUTO: 36.9 %
HDLC SERPL-MCNC: 77 MG/DL
HDLC SERPL: 35.6 %
HGB BLD-MCNC: 12.1 G/DL
IMM GRANULOCYTES # BLD AUTO: 0.03 K/UL
IMM GRANULOCYTES NFR BLD AUTO: 0.5 %
IRON SERPL-MCNC: 102 UG/DL
LDLC SERPL CALC-MCNC: 119.4 MG/DL
LYMPHOCYTES # BLD AUTO: 1.3 K/UL
LYMPHOCYTES NFR BLD: 20.6 %
MCH RBC QN AUTO: 31.1 PG
MCHC RBC AUTO-ENTMCNC: 32.8 G/DL
MCV RBC AUTO: 95 FL
MONOCYTES # BLD AUTO: 0.5 K/UL
MONOCYTES NFR BLD: 7.3 %
NEUTROPHILS # BLD AUTO: 4.4 K/UL
NEUTROPHILS NFR BLD: 67.4 %
NONHDLC SERPL-MCNC: 139 MG/DL
NRBC BLD-RTO: 0 /100 WBC
PLATELET # BLD AUTO: 275 K/UL
PMV BLD AUTO: 10.5 FL
POTASSIUM SERPL-SCNC: 4.5 MMOL/L
PROT SERPL-MCNC: 6.9 G/DL
RBC # BLD AUTO: 3.89 M/UL
SATURATED IRON: 29 %
SODIUM SERPL-SCNC: 133 MMOL/L
TOTAL IRON BINDING CAPACITY: 348 UG/DL
TRANSFERRIN SERPL-MCNC: 235 MG/DL
TRANSFERRIN SERPL-MCNC: 235 MG/DL
TRIGL SERPL-MCNC: 98 MG/DL
VIT B12 SERPL-MCNC: 238 PG/ML
WBC # BLD AUTO: 6.47 K/UL

## 2018-10-03 PROCEDURE — 99214 OFFICE O/P EST MOD 30 MIN: CPT | Mod: S$PBB,,, | Performed by: FAMILY MEDICINE

## 2018-10-03 PROCEDURE — 82306 VITAMIN D 25 HYDROXY: CPT

## 2018-10-03 PROCEDURE — 99214 OFFICE O/P EST MOD 30 MIN: CPT | Mod: PBBFAC | Performed by: FAMILY MEDICINE

## 2018-10-03 PROCEDURE — 82550 ASSAY OF CK (CPK): CPT

## 2018-10-03 PROCEDURE — 1101F PT FALLS ASSESS-DOCD LE1/YR: CPT | Mod: CPTII,,, | Performed by: FAMILY MEDICINE

## 2018-10-03 PROCEDURE — 80053 COMPREHEN METABOLIC PANEL: CPT

## 2018-10-03 PROCEDURE — 99999 PR PBB SHADOW E&M-EST. PATIENT-LVL IV: CPT | Mod: PBBFAC,,, | Performed by: FAMILY MEDICINE

## 2018-10-03 PROCEDURE — 90662 IIV NO PRSV INCREASED AG IM: CPT | Mod: PBBFAC

## 2018-10-03 PROCEDURE — 82607 VITAMIN B-12: CPT

## 2018-10-03 PROCEDURE — 85025 COMPLETE CBC W/AUTO DIFF WBC: CPT

## 2018-10-03 PROCEDURE — 80061 LIPID PANEL: CPT

## 2018-10-03 PROCEDURE — 83540 ASSAY OF IRON: CPT

## 2018-10-03 RX ORDER — ALPRAZOLAM 0.5 MG/1
0.5 TABLET ORAL 3 TIMES DAILY
Qty: 90 TABLET | Refills: 0 | Status: SHIPPED | OUTPATIENT
Start: 2018-10-03 | End: 2018-11-16 | Stop reason: SDUPTHER

## 2018-10-03 NOTE — PROGRESS NOTES
"Subjective:       Patient ID: Renee Arteaga is a 73 y.o. female.    Chief Complaint: Medication Refill and Mass (right side, )    HPI Ms. Arteaga presents today for medication refill and mass on the right side.     She follows up every few months for refill on her Xanax for anxiety and sleep. Lots of days she takes it 3 times a day but tries not to. When she can she does not take it 3 times a day.   Building a smaller house currently. Staying with brother in the meantime.     She was seen by Dr. Reddy for the right side pain and bulge-he sent Ms. Arteaga to surgery for suspected hernia and the surgeon's exam stated:   "Right-sided abdomen more prominent due to patient's positioning she is standing with her right hip elevated higher than her left hip which is likely accounting for the bulge she notices of her left abdominal wall as there is no palpable hernias or masses."  She was told to follow up with chiropractor and orthopedics.   The chiropractor did a small adjustment prior to all of this and she noted the knot that evening.     MRI scheduled for Friday of lower back at [facility].     ED did a CT scan.     The right side she can feel and it is uncomfortable. Not all the time.     Review of Systems   Constitutional: Negative.    Eyes: Negative.    Respiratory: Negative.    Psychiatric/Behavioral: Positive for sleep disturbance. The patient is nervous/anxious.          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 4/12/2018    Depression     Heart murmur     Hyperlipidemia     Scoliosis     Tachycardia      Objective:        Physical Exam    Constitutional: She is oriented to person, place, and time. She appears well-developed and well-nourished. She does not have a sickly appearance. No distress.   HENT:   Head: " Normocephalic and atraumatic.   Right Ear: External ear normal.   Left Ear: External ear normal.   Pulmonary/Chest: Effort normal and breath sounds normal. No stridor. No respiratory distress.   Abdominal: Soft. Normal appearance and bowel sounds are normal. She exhibits no distension. There is no tenderness. There is no guarding. No hernia.   Bulging mass on R L Q of abdomen, it appears positional. Uncomfortable to deep palpation.  Seems to resolve when lying flat on her back.  Musculoskeletal: Normal range of motion.   Neurological: She is alert and oriented to person, place, and time. She is not disoriented.   Skin: Skin is warm, dry and intact. No rash noted. She is not diaphoretic.   Psychiatric: She has a normal mood and affect. Her speech is normal and behavior is normal. Thought content normal.     Assessment/Plan:     No energy  -     Vitamin B12; Future; Expected date: 10/03/2018  -     Vitamin D; Future; Expected date: 10/03/2018    Anxiety  -     ALPRAZolam (XANAX) 0.5 MG tablet; Take 1 tablet (0.5 mg total) by mouth 3 (three) times daily.  Dispense: 90 tablet; Refill: 0    Insomnia, unspecified type  -     ALPRAZolam (XANAX) 0.5 MG tablet; Take 1 tablet (0.5 mg total) by mouth 3 (three) times daily.  Dispense: 90 tablet; Refill: 0    Other long term (current) drug therapy   -     Vitamin B12; Future; Expected date: 10/03/2018    Other myositis, unspecified site -not a true myositis but she is having muscle pain of the right side  -     Vitamin D; Future; Expected date: 10/03/2018    Other orders  -     Influenza - High Dose (65+) (PF) (IM)      Follow-up in about 3 months (around 1/3/2019).    Odessa Julio MD  [facility]   Family Medicine

## 2018-10-09 ENCOUNTER — TELEPHONE (OUTPATIENT)
Dept: INTERNAL MEDICINE | Facility: CLINIC | Age: 73
End: 2018-10-09

## 2018-10-09 NOTE — TELEPHONE ENCOUNTER
----- Message from Odessa Julio MD sent at 10/4/2018  8:54 AM CDT -----  Vitamin D low need to supplement- OTC 4000 IU daily  Cholesterol is better but still high-continue medication and lets focus on diet-making healthier choices to help get this even lower.   Sodium was a little low we can recheck in a month or so to make sure this is coming back up. 5 months ago it was 135 recent labs it was 133 normal is 136. powerade has some sodium in it, soups-I wouldn't add salt to anything though  Vitamin B12 is normal but low normal- multivitamin  Other labs not concerning

## 2018-10-09 NOTE — TELEPHONE ENCOUNTER
Notified pt of results and recommendations. Pt verbalized understanding. Reports she will call back when she can come in to repeat the sodium labs. Pt agreed to plan.

## 2018-10-12 ENCOUNTER — TELEPHONE (OUTPATIENT)
Dept: INTERNAL MEDICINE | Facility: CLINIC | Age: 73
End: 2018-10-12

## 2018-10-12 DIAGNOSIS — E87.1 LOW SODIUM LEVELS: ICD-10-CM

## 2018-10-12 DIAGNOSIS — E55.9 VITAMIN D DEFICIENCY: Primary | ICD-10-CM

## 2018-10-12 RX ORDER — PRAVASTATIN SODIUM 20 MG/1
20 TABLET ORAL DAILY
Qty: 90 TABLET | Refills: 1 | Status: SHIPPED | OUTPATIENT
Start: 2018-10-12 | End: 2019-01-10 | Stop reason: SDUPTHER

## 2018-10-12 NOTE — TELEPHONE ENCOUNTER
Notified pt of results and recommendations. Pt verbalized understanding. Pt states she will call back to schedule appt for repeat labs.   F/u appt already scheduled for if not done by then will get done at that time.

## 2018-10-12 NOTE — TELEPHONE ENCOUNTER
Patient presented Monday of this week with her daughter. We reviewed her labs and we have decided to decrease her pravastatin. Will send new prescription today.

## 2018-10-15 NOTE — TELEPHONE ENCOUNTER
Spoke with patient and  Informed her pravastatin has been sent into pharmacy. Patient states she has picked up script and began to take it. Patient also requesting refill of xanax, patient informed xanax was refilled on 10/03/18 and should be available for  at [facility] pharmacy. Patient verbalized understanding.

## 2018-11-16 DIAGNOSIS — F41.9 ANXIETY: ICD-10-CM

## 2018-11-16 DIAGNOSIS — G47.00 INSOMNIA, UNSPECIFIED TYPE: ICD-10-CM

## 2018-11-16 RX ORDER — ALPRAZOLAM 0.5 MG/1
0.5 TABLET ORAL 3 TIMES DAILY
Qty: 90 TABLET | Refills: 0 | Status: SHIPPED | OUTPATIENT
Start: 2018-11-16 | End: 2018-12-13 | Stop reason: SDUPTHER

## 2018-12-13 DIAGNOSIS — G47.00 INSOMNIA, UNSPECIFIED TYPE: ICD-10-CM

## 2018-12-13 DIAGNOSIS — K21.9 GASTROESOPHAGEAL REFLUX DISEASE WITHOUT ESOPHAGITIS: ICD-10-CM

## 2018-12-13 DIAGNOSIS — F41.9 ANXIETY: ICD-10-CM

## 2018-12-13 RX ORDER — PANTOPRAZOLE SODIUM 40 MG/1
40 TABLET, DELAYED RELEASE ORAL DAILY
Qty: 30 TABLET | Refills: 6 | Status: SHIPPED | OUTPATIENT
Start: 2018-12-13 | End: 2018-12-14 | Stop reason: SDUPTHER

## 2018-12-13 RX ORDER — ALPRAZOLAM 0.5 MG/1
0.5 TABLET ORAL 3 TIMES DAILY
Qty: 90 TABLET | Refills: 0 | Status: SHIPPED | OUTPATIENT
Start: 2018-12-13 | End: 2018-12-14 | Stop reason: SDUPTHER

## 2018-12-13 NOTE — TELEPHONE ENCOUNTER
----- Message from India Perkins sent at 12/13/2018  9:24 AM CST -----  Contact: self  Currently out of medication    1. What is the name of the medication you are requesting?alprazolam/pantoprazolesod  2. What is the dose? 0.5 mg/40 mg  3. How do you take the medication? Orally, topically, etc? Orally/orally  4. How often do you take this medication? One three times daily/once a day  5. Do you need a 30 day or 90 day supply? n/a  6. How many refills are you requesting? n/a  7. What is your preferred pharmacy and location of the pharmacy?  [facility] - [facility] [address]  [address]  [place] [address]  Phone: [phone] Fax: [phone]          8. Who can we contact with further questions? self

## 2018-12-14 DIAGNOSIS — G47.00 INSOMNIA, UNSPECIFIED TYPE: ICD-10-CM

## 2018-12-14 DIAGNOSIS — F41.9 ANXIETY: ICD-10-CM

## 2018-12-14 DIAGNOSIS — K21.9 GASTROESOPHAGEAL REFLUX DISEASE WITHOUT ESOPHAGITIS: ICD-10-CM

## 2018-12-14 RX ORDER — PANTOPRAZOLE SODIUM 40 MG/1
40 TABLET, DELAYED RELEASE ORAL DAILY
Qty: 30 TABLET | Refills: 6 | Status: SHIPPED | OUTPATIENT
Start: 2018-12-14 | End: 2019-01-10 | Stop reason: SDUPTHER

## 2018-12-14 RX ORDER — ALPRAZOLAM 0.5 MG/1
0.5 TABLET ORAL 3 TIMES DAILY
Qty: 90 TABLET | Refills: 0 | Status: SHIPPED | OUTPATIENT
Start: 2018-12-14 | End: 2019-01-10 | Stop reason: SDUPTHER

## 2018-12-28 DIAGNOSIS — E78.1 HYPERTRIGLYCERIDEMIA: ICD-10-CM

## 2018-12-29 RX ORDER — FENOFIBRATE 54 MG/1
54 TABLET ORAL DAILY
Qty: 90 TABLET | Refills: 0 | Status: SHIPPED | OUTPATIENT
Start: 2018-12-29 | End: 2019-01-10 | Stop reason: SDUPTHER

## 2019-01-02 ENCOUNTER — PATIENT OUTREACH (OUTPATIENT)
Dept: ADMINISTRATIVE | Facility: HOSPITAL | Age: 74
End: 2019-01-02

## 2019-01-09 DIAGNOSIS — R00.2 PALPITATIONS: ICD-10-CM

## 2019-01-09 DIAGNOSIS — E78.5 DYSLIPIDEMIA: Primary | ICD-10-CM

## 2019-01-10 ENCOUNTER — CLINICAL SUPPORT (OUTPATIENT)
Dept: CARDIOLOGY | Facility: CLINIC | Age: 74
End: 2019-01-10
Payer: MEDICARE

## 2019-01-10 ENCOUNTER — OFFICE VISIT (OUTPATIENT)
Dept: INTERNAL MEDICINE | Facility: CLINIC | Age: 74
End: 2019-01-10
Payer: MEDICARE

## 2019-01-10 ENCOUNTER — LAB VISIT (OUTPATIENT)
Dept: LAB | Facility: HOSPITAL | Age: 74
End: 2019-01-10
Attending: FAMILY MEDICINE
Payer: MEDICARE

## 2019-01-10 ENCOUNTER — OFFICE VISIT (OUTPATIENT)
Dept: CARDIOLOGY | Facility: CLINIC | Age: 74
End: 2019-01-10
Payer: MEDICARE

## 2019-01-10 VITALS
SYSTOLIC BLOOD PRESSURE: 114 MMHG | BODY MASS INDEX: 23.17 KG/M2 | TEMPERATURE: 96 F | HEART RATE: 58 BPM | WEIGHT: 144.19 LBS | HEIGHT: 66 IN | DIASTOLIC BLOOD PRESSURE: 62 MMHG

## 2019-01-10 VITALS
SYSTOLIC BLOOD PRESSURE: 122 MMHG | WEIGHT: 140 LBS | HEART RATE: 58 BPM | DIASTOLIC BLOOD PRESSURE: 58 MMHG | HEIGHT: 68 IN | BODY MASS INDEX: 21.22 KG/M2

## 2019-01-10 DIAGNOSIS — R00.2 HEART PALPITATIONS: ICD-10-CM

## 2019-01-10 DIAGNOSIS — M47.817 LUMBOSACRAL SPONDYLOSIS WITHOUT MYELOPATHY: ICD-10-CM

## 2019-01-10 DIAGNOSIS — E87.1 LOW SODIUM LEVELS: ICD-10-CM

## 2019-01-10 DIAGNOSIS — E78.5 DYSLIPIDEMIA: ICD-10-CM

## 2019-01-10 DIAGNOSIS — I70.219 ATHEROSCLEROTIC PVD WITH INTERMITTENT CLAUDICATION: Primary | ICD-10-CM

## 2019-01-10 DIAGNOSIS — E78.5 DYSLIPIDEMIA: Chronic | ICD-10-CM

## 2019-01-10 DIAGNOSIS — R00.2 PALPITATIONS: ICD-10-CM

## 2019-01-10 DIAGNOSIS — K21.9 GASTROESOPHAGEAL REFLUX DISEASE WITHOUT ESOPHAGITIS: ICD-10-CM

## 2019-01-10 DIAGNOSIS — I70.219 EXTREMITY ATHEROSCLEROSIS WITH INTERMITTENT CLAUDICATION: ICD-10-CM

## 2019-01-10 DIAGNOSIS — G47.00 INSOMNIA, UNSPECIFIED TYPE: ICD-10-CM

## 2019-01-10 DIAGNOSIS — E55.9 VITAMIN D DEFICIENCY: ICD-10-CM

## 2019-01-10 DIAGNOSIS — M16.12 ARTHRITIS OF LEFT HIP: ICD-10-CM

## 2019-01-10 DIAGNOSIS — F41.9 ANXIETY: Primary | ICD-10-CM

## 2019-01-10 DIAGNOSIS — M16.11 ARTHRITIS OF RIGHT HIP: ICD-10-CM

## 2019-01-10 DIAGNOSIS — E78.1 HYPERTRIGLYCERIDEMIA: ICD-10-CM

## 2019-01-10 LAB
25(OH)D3+25(OH)D2 SERPL-MCNC: 36 NG/ML
ALBUMIN SERPL BCP-MCNC: 3.9 G/DL
ALP SERPL-CCNC: 63 U/L
ALT SERPL W/O P-5'-P-CCNC: 8 U/L
ANION GAP SERPL CALC-SCNC: 10 MMOL/L
AST SERPL-CCNC: 14 U/L
BILIRUB SERPL-MCNC: 0.6 MG/DL
BUN SERPL-MCNC: 15 MG/DL
CALCIUM SERPL-MCNC: 9.8 MG/DL
CHLORIDE SERPL-SCNC: 100 MMOL/L
CHOLEST SERPL-MCNC: 234 MG/DL
CHOLEST/HDLC SERPL: 3.2 {RATIO}
CO2 SERPL-SCNC: 26 MMOL/L
CREAT SERPL-MCNC: 1 MG/DL
EST. GFR  (AFRICAN AMERICAN): >60 ML/MIN/1.73 M^2
EST. GFR  (NON AFRICAN AMERICAN): 56.4 ML/MIN/1.73 M^2
GLUCOSE SERPL-MCNC: 107 MG/DL
HDLC SERPL-MCNC: 73 MG/DL
HDLC SERPL: 31.2 %
LDLC SERPL CALC-MCNC: 140.8 MG/DL
NONHDLC SERPL-MCNC: 161 MG/DL
POTASSIUM SERPL-SCNC: 4.9 MMOL/L
PROT SERPL-MCNC: 7.2 G/DL
SODIUM SERPL-SCNC: 136 MMOL/L
TRIGL SERPL-MCNC: 101 MG/DL

## 2019-01-10 PROCEDURE — 99499 RISK ADDL DX/OHS AUDIT: ICD-10-PCS | Mod: S$GLB,,, | Performed by: FAMILY MEDICINE

## 2019-01-10 PROCEDURE — 99214 PR OFFICE/OUTPT VISIT, EST, LEVL IV, 30-39 MIN: ICD-10-PCS | Mod: S$GLB,,, | Performed by: NUCLEAR MEDICINE

## 2019-01-10 PROCEDURE — 80061 LIPID PANEL: CPT

## 2019-01-10 PROCEDURE — 99999 PR PBB SHADOW E&M-EST. PATIENT-LVL III: CPT | Mod: PBBFAC,,, | Performed by: NUCLEAR MEDICINE

## 2019-01-10 PROCEDURE — 1101F PR PT FALLS ASSESS DOC 0-1 FALLS W/OUT INJ PAST YR: ICD-10-PCS | Mod: CPTII,S$GLB,, | Performed by: NUCLEAR MEDICINE

## 2019-01-10 PROCEDURE — 99999 PR PBB SHADOW E&M-EST. PATIENT-LVL II: ICD-10-PCS | Mod: PBBFAC,,, | Performed by: FAMILY MEDICINE

## 2019-01-10 PROCEDURE — 36415 COLL VENOUS BLD VENIPUNCTURE: CPT

## 2019-01-10 PROCEDURE — 1101F PR PT FALLS ASSESS DOC 0-1 FALLS W/OUT INJ PAST YR: ICD-10-PCS | Mod: CPTII,S$GLB,, | Performed by: FAMILY MEDICINE

## 2019-01-10 PROCEDURE — 1101F PT FALLS ASSESS-DOCD LE1/YR: CPT | Mod: CPTII,S$GLB,, | Performed by: NUCLEAR MEDICINE

## 2019-01-10 PROCEDURE — 99214 OFFICE O/P EST MOD 30 MIN: CPT | Mod: S$GLB,,, | Performed by: NUCLEAR MEDICINE

## 2019-01-10 PROCEDURE — 82306 VITAMIN D 25 HYDROXY: CPT

## 2019-01-10 PROCEDURE — 99214 OFFICE O/P EST MOD 30 MIN: CPT | Mod: S$GLB,,, | Performed by: FAMILY MEDICINE

## 2019-01-10 PROCEDURE — 1101F PT FALLS ASSESS-DOCD LE1/YR: CPT | Mod: CPTII,S$GLB,, | Performed by: FAMILY MEDICINE

## 2019-01-10 PROCEDURE — 93000 EKG 12-LEAD: ICD-10-PCS | Mod: S$GLB,,, | Performed by: INTERNAL MEDICINE

## 2019-01-10 PROCEDURE — 99999 PR PBB SHADOW E&M-EST. PATIENT-LVL II: CPT | Mod: PBBFAC,,, | Performed by: FAMILY MEDICINE

## 2019-01-10 PROCEDURE — 99999 PR PBB SHADOW E&M-EST. PATIENT-LVL III: ICD-10-PCS | Mod: PBBFAC,,, | Performed by: NUCLEAR MEDICINE

## 2019-01-10 PROCEDURE — 80053 COMPREHEN METABOLIC PANEL: CPT

## 2019-01-10 PROCEDURE — 93000 ELECTROCARDIOGRAM COMPLETE: CPT | Mod: S$GLB,,, | Performed by: INTERNAL MEDICINE

## 2019-01-10 PROCEDURE — 99499 UNLISTED E&M SERVICE: CPT | Mod: S$GLB,,, | Performed by: FAMILY MEDICINE

## 2019-01-10 PROCEDURE — 99214 PR OFFICE/OUTPT VISIT, EST, LEVL IV, 30-39 MIN: ICD-10-PCS | Mod: S$GLB,,, | Performed by: FAMILY MEDICINE

## 2019-01-10 RX ORDER — ALPRAZOLAM 0.5 MG/1
0.5 TABLET ORAL 3 TIMES DAILY
Qty: 90 TABLET | Refills: 0 | Status: SHIPPED | OUTPATIENT
Start: 2019-01-10 | End: 2019-02-11 | Stop reason: SDUPTHER

## 2019-01-10 RX ORDER — MELOXICAM 15 MG/1
15 TABLET ORAL DAILY
Qty: 90 TABLET | Refills: 3 | Status: SHIPPED | OUTPATIENT
Start: 2019-01-10 | End: 2020-03-12 | Stop reason: SDUPTHER

## 2019-01-10 RX ORDER — METOPROLOL SUCCINATE 25 MG/1
25 TABLET, EXTENDED RELEASE ORAL DAILY
Qty: 90 TABLET | Refills: 3 | Status: SHIPPED | OUTPATIENT
Start: 2019-01-10 | End: 2020-03-12 | Stop reason: SDUPTHER

## 2019-01-10 RX ORDER — FENOFIBRATE 54 MG/1
54 TABLET ORAL DAILY
Qty: 90 TABLET | Refills: 0 | Status: SHIPPED | OUTPATIENT
Start: 2019-01-10 | End: 2019-04-11 | Stop reason: SDUPTHER

## 2019-01-10 RX ORDER — PRAVASTATIN SODIUM 20 MG/1
20 TABLET ORAL DAILY
Qty: 90 TABLET | Refills: 1 | Status: SHIPPED | OUTPATIENT
Start: 2019-01-10 | End: 2019-06-14 | Stop reason: SDUPTHER

## 2019-01-10 RX ORDER — PANTOPRAZOLE SODIUM 40 MG/1
40 TABLET, DELAYED RELEASE ORAL DAILY
Qty: 30 TABLET | Refills: 6 | Status: SHIPPED | OUTPATIENT
Start: 2019-01-10 | End: 2019-08-15 | Stop reason: SDUPTHER

## 2019-01-10 NOTE — PROGRESS NOTES
Subjective:       Patient ID: Renee Arteaga is a 73 y.o. female.    Chief Complaint: Follow-up (3 mth f/u) and Medication Refill    HPI Ms. Arteaga presents today for follow up and medication refill.   She is getting a new house built and selling her old one. She is currently living with brother while her house is being built.     Her disabled daughter is doing ok.     She had a hard time over the holidays without her  this was the second wilberto that he was not present. The first his daughter had her own health issues that had her distracted this year she cried a lot which made it difficult (more difficult) for Ms. Arteaga.    Daughter is followed by Neurology and not sure if she has dementia or all of the medication she is on.     Review of Systems   Constitutional: Positive for fatigue. Negative for activity change, appetite change and diaphoresis.   Respiratory: Negative.    Genitourinary: Negative.    Musculoskeletal: Negative.    Neurological: Negative.    Psychiatric/Behavioral: Positive for decreased concentration. The patient is nervous/anxious (stable with medication).          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   HENT:   Head: Normocephalic and atraumatic.   Eyes: EOM are normal.   Cardiovascular: Normal heart sounds.   Pulmonary/Chest: Breath sounds normal.   Skin: Skin is warm.   Psychiatric: She has a normal mood and affect. Her behavior is normal.   Vitals reviewed.        Results for orders placed or performed in visit on 10/03/18   Lipid panel   Result Value Ref Range    Cholesterol 216 (H) 120 - 199 mg/dL     Triglycerides 98 30 - 150 mg/dL    HDL 77 (H) 40 - 75 mg/dL    LDL Cholesterol 119.4 63.0 - 159.0 mg/dL    HDL/Chol Ratio 35.6 20.0 - 50.0 %    Total Cholesterol/HDL Ratio 2.8 2.0 - 5.0    Non-HDL Cholesterol 139 mg/dL   CK   Result Value Ref Range    CPK 43 20 - 180 U/L   Comprehensive metabolic panel   Result Value Ref Range    Sodium 133 (L) 136 - 145 mmol/L    Potassium 4.5 3.5 - 5.1 mmol/L    Chloride 101 95 - 110 mmol/L    CO2 24 23 - 29 mmol/L    Glucose 94 70 - 110 mg/dL    BUN, Bld 14 8 - 23 mg/dL    Creatinine 1.0 0.5 - 1.4 mg/dL    Calcium 9.8 8.7 - 10.5 mg/dL    Total Protein 6.9 6.0 - 8.4 g/dL    Albumin 4.0 3.5 - 5.2 g/dL    Total Bilirubin 0.6 0.1 - 1.0 mg/dL    Alkaline Phosphatase 59 55 - 135 U/L    AST 16 10 - 40 U/L    ALT 6 (L) 10 - 44 U/L    Anion Gap 8 8 - 16 mmol/L    eGFR if African American >60.0 >60 mL/min/1.73 m^2    eGFR if non  56.4 (A) >60 mL/min/1.73 m^2   CBC auto differential   Result Value Ref Range    WBC 6.47 3.90 - 12.70 K/uL    RBC 3.89 (L) 4.00 - 5.40 M/uL    Hemoglobin 12.1 12.0 - 16.0 g/dL    Hematocrit 36.9 (L) 37.0 - 48.5 %    MCV 95 82 - 98 fL    MCH 31.1 (H) 27.0 - 31.0 pg    MCHC 32.8 32.0 - 36.0 g/dL    RDW 12.7 11.5 - 14.5 %    Platelets 275 150 - 350 K/uL    MPV 10.5 9.2 - 12.9 fL    Immature Granulocytes 0.5 0.0 - 0.5 %    Gran # (ANC) 4.4 1.8 - 7.7 K/uL    Immature Grans (Abs) 0.03 0.00 - 0.04 K/uL    Lymph # 1.3 1.0 - 4.8 K/uL    Mono # 0.5 0.3 - 1.0 K/uL    Eos # 0.2 0.0 - 0.5 K/uL    Baso # 0.05 0.00 - 0.20 K/uL    nRBC 0 0 /100 WBC    Gran% 67.4 38.0 - 73.0 %    Lymph% 20.6 18.0 - 48.0 %    Mono% 7.3 4.0 - 15.0 %    Eosinophil% 3.4 0.0 - 8.0 %    Basophil% 0.8 0.0 - 1.9 %    Differential Method Automated    Iron and TIBC   Result Value Ref Range    Iron 102 30 - 160 ug/dL    Transferrin 235 200 - 375 mg/dL    TIBC 348 250 - 450 ug/dL    Saturated Iron 29 20 - 50 %   TRANSFERRIN   Result Value Ref Range    Transferrin 235 200 - 375 mg/dL   Vitamin  B12   Result Value Ref Range    Vitamin B-12 238 210 - 950 pg/mL   Vitamin D   Result Value Ref Range    Vit D, 25-Hydroxy 16 (L) 30 - 96 ng/mL       Assessment/Plan:   Anxiety  -     ALPRAZolam (XANAX) 0.5 MG tablet; Take 1 tablet (0.5 mg total) by mouth 3 (three) times daily.  Dispense: 90 tablet; Refill: 0    Insomnia, unspecified type  -     ALPRAZolam (XANAX) 0.5 MG tablet; Take 1 tablet (0.5 mg total) by mouth 3 (three) times daily.  Dispense: 90 tablet; Refill: 0    Extremity atherosclerosis with intermittent claudication  Treat cholesterol   Monitor diet -heart healthy diet  Exercise is encouraged.    Hypertriglyceridemia  -     fenofibrate (TRICOR) 54 MG tablet; Take 1 tablet (54 mg total) by mouth once daily.  Dispense: 90 tablet; Refill: 0    Arthritis of left hip  -     meloxicam (MOBIC) 15 MG tablet; Take 1 tablet (15 mg total) by mouth once daily.  Dispense: 90 tablet; Refill: 3    Arthritis of right hip  -     meloxicam (MOBIC) 15 MG tablet; Take 1 tablet (15 mg total) by mouth once daily.  Dispense: 90 tablet; Refill: 3    Lumbosacral spondylosis without myelopathy  -     meloxicam (MOBIC) 15 MG tablet; Take 1 tablet (15 mg total) by mouth once daily.  Dispense: 90 tablet; Refill: 3    Heart palpitations-stable  -     metoprolol succinate (TOPROL-XL) 25 MG 24 hr tablet; Take 1 tablet (25 mg total) by mouth once daily.  Dispense: 90 tablet; Refill: 3    Gastroesophageal reflux disease without esophagitis-stable  -     pantoprazole (PROTONIX) 40 MG tablet; Take 1 tablet (40 mg total) by mouth once daily.  Dispense: 30 tablet; Refill: 6    Dyslipidemia  -     Lipid panel; Future; Expected date: 01/10/2019  -     pravastatin (PRAVACHOL) 20 MG tablet; Take 1 tablet (20 mg total) by mouth once daily. 1 Tablet Oral Every day  Dispense: 90 tablet; Refill: 1      Follow-up in about 3 months (around 4/10/2019).    Odessa Julio MD  [facility]   Family Medicine

## 2019-01-10 NOTE — PROGRESS NOTES
Subjective:   Patient ID:  Renee Arteaga is a 73 y.o. female who presents for follow-up of Palpitations and Hyperlipidemia      HPI 1- PAD OF LE.  2- PALPITATIONS  PATTERN OF INTERMITTENT CLAUDICATION STABLE  PALPITATIONS CONTROLLED  NO UNUSUAL VALENZUELA. NO ORTHOPNEA OR PND  NO CHEST DISCOMFORT  NO NEAR SYNCOPE OR SYNCOPE  NO FOCAL CNS SYMPTOMS OR SIGNS TO SUGGEST TIA OR STROKE  CARD MED GOOD COMPLIANCE  ECG TODAY- SR WITH SA . OTHERWISE UNREMARKABLE    Review of Systems   Constitution: Negative for chills, fever, weakness, night sweats, weight gain and weight loss.   HENT: Negative for nosebleeds.    Eyes: Negative for blurred vision, double vision and visual disturbance.   Cardiovascular: Negative for chest pain, dyspnea on exertion, irregular heartbeat, leg swelling, orthopnea, palpitations, paroxysmal nocturnal dyspnea and syncope.   Respiratory: Negative for cough, hemoptysis and wheezing.    Endocrine: Negative for polydipsia and polyuria.   Hematologic/Lymphatic: Does not bruise/bleed easily.   Skin: Negative for rash.   Musculoskeletal: Negative for joint pain, joint swelling, muscle weakness and myalgias.   Gastrointestinal: Negative for abdominal pain, hematemesis, jaundice and melena.   Genitourinary: Negative for dysuria, hematuria and nocturia.   Neurological: Negative for dizziness, focal weakness, headaches and sensory change.   Psychiatric/Behavioral: Negative for depression. The patient does not have insomnia and is not nervous/anxious.      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 4/12/2018    Depression     Heart murmur     Hyperlipidemia     Scoliosis     Tachycardia      Current Outpatient  Medications on File Prior to Visit   Medication Sig Dispense Refill    aspirin (ECOTRIN) 81 MG EC tablet Take 1 tablet by mouth Daily. 1 Tablet, Delayed Release (E.C.) Oral Every day      cilostazol (PLETAL) 50 MG Tab Take 1 tablet (50 mg total) by mouth 2 (two) times daily. 60 tablet 11    [DISCONTINUED] ALPRAZolam (XANAX) 0.5 MG tablet Take 1 tablet (0.5 mg total) by mouth 3 (three) times daily. 90 tablet 0    [DISCONTINUED] fenofibrate (TRICOR) 54 MG tablet Take 1 tablet (54 mg total) by mouth once daily. 90 tablet 0    [DISCONTINUED] lidocaine-prilocaine (EMLA) cream       [DISCONTINUED] meloxicam (MOBIC) 15 MG tablet Take 1 tablet (15 mg total) by mouth once daily. 90 tablet 3    [DISCONTINUED] metoprolol succinate (TOPROL-XL) 25 MG 24 hr tablet Take 1 tablet (25 mg total) by mouth once daily. 90 tablet 3    [DISCONTINUED] pantoprazole (PROTONIX) 40 MG tablet Take 1 tablet (40 mg total) by mouth once daily. 30 tablet 6    [DISCONTINUED] pravastatin (PRAVACHOL) 20 MG tablet Take 1 tablet (20 mg total) by mouth once daily. 1 Tablet Oral Every day 90 tablet 1     No current facility-administered medications on file prior to visit.      Review of patient's allergies indicates:  No Known Allergies    Objective:     Physical Exam   Constitutional: She is oriented to person, place, and time. She appears well-developed. No distress.   HENT:   Head: Normocephalic.   Eyes: Conjunctivae are normal. Pupils are equal, round, and reactive to light. No scleral icterus.   Neck: Normal range of motion. Neck supple. Normal carotid pulses, no hepatojugular reflux and no JVD present. Carotid bruit is not present. No edema present. No thyroid mass and no thyromegaly present.   Cardiovascular: Normal rate, regular rhythm, S1 normal, S2 normal, normal heart sounds and intact distal pulses. PMI is not displaced. Exam reveals no gallop and no friction rub.   No murmur heard.  Pulses:       Carotid pulses are 2+ on the right  side, and 2+ on the left side.       Radial pulses are 2+ on the right side, and 2+ on the left side.        Femoral pulses are 2+ on the right side, and 2+ on the left side.       Popliteal pulses are 2+ on the right side, and 2+ on the left side.        Dorsalis pedis pulses are 2+ on the right side, and 2+ on the left side.        Posterior tibial pulses are 2+ on the right side, and 2+ on the left side.   Pulmonary/Chest: Effort normal and breath sounds normal. She has no wheezes. She has no rales. She exhibits no tenderness.   Abdominal: Soft. Bowel sounds are normal. She exhibits no pulsatile midline mass and no mass. There is no hepatosplenomegaly. There is no tenderness.   Musculoskeletal: Normal range of motion. She exhibits no edema or tenderness.        Cervical back: Normal.        Thoracic back: Normal.        Lumbar back: Normal.   Lymphadenopathy:     She has no cervical adenopathy.     She has no axillary adenopathy.        Right: No supraclavicular adenopathy present.        Left: No supraclavicular adenopathy present.   Neurological: She is alert and oriented to person, place, and time. She has normal strength and normal reflexes. No sensory deficit. Gait normal.   Skin: Skin is warm. No rash noted. No cyanosis. No pallor. Nails show no clubbing.   Psychiatric: She has a normal mood and affect. Her speech is normal and behavior is normal. Cognition and memory are normal.       Assessment:     1. Atherosclerotic PVD with intermittent claudication    2. Dyslipidemia    3. Heart palpitations      STABLE CV STATUSS- NO ACTIVE MYOCARDIAL ISCHEMIA. NO ADHF  CARD ARRHYTHMIAS CONTROLLED  BP STABLE  CNS STATUS STABLE  CARD MED WELL TOLERATED  Plan:     Atherosclerotic PVD with intermittent claudication    Dyslipidemia    Heart palpitations    1- CONTINUE PRESENT CARD MANAGEMENT    2- RETURN IN ONE YEAR

## 2019-01-17 ENCOUNTER — TELEPHONE (OUTPATIENT)
Dept: INTERNAL MEDICINE | Facility: CLINIC | Age: 74
End: 2019-01-17

## 2019-01-17 NOTE — TELEPHONE ENCOUNTER
----- Message from Slime Campos sent at 1/17/2019  2:53 PM CST -----  Contact: pt   Pt was returning nurse call regarding labs.    [phone] (home)

## 2019-01-17 NOTE — TELEPHONE ENCOUNTER
----- Message from Odessa Julio MD sent at 1/15/2019  8:49 AM CST -----  Total cholesterol a little high but the reat of the panel was normal. Vitamin D is normal. Merabolic panel normal

## 2019-02-11 DIAGNOSIS — F41.9 ANXIETY: ICD-10-CM

## 2019-02-11 DIAGNOSIS — G47.00 INSOMNIA, UNSPECIFIED TYPE: ICD-10-CM

## 2019-02-11 RX ORDER — ALPRAZOLAM 0.5 MG/1
0.5 TABLET ORAL 3 TIMES DAILY
Qty: 90 TABLET | Refills: 0 | Status: SHIPPED | OUTPATIENT
Start: 2019-02-11 | End: 2019-03-12 | Stop reason: SDUPTHER

## 2019-02-19 ENCOUNTER — TELEPHONE (OUTPATIENT)
Dept: INTERNAL MEDICINE | Facility: CLINIC | Age: 74
End: 2019-02-19

## 2019-02-19 DIAGNOSIS — R68.89 FLU-LIKE SYMPTOMS: Primary | ICD-10-CM

## 2019-02-19 RX ORDER — OSELTAMIVIR PHOSPHATE 75 MG/1
75 CAPSULE ORAL 2 TIMES DAILY
Qty: 10 CAPSULE | Refills: 0 | Status: SHIPPED | OUTPATIENT
Start: 2019-02-19 | End: 2019-02-24

## 2019-02-19 NOTE — TELEPHONE ENCOUNTER
----- Message from Donya Small sent at 2/19/2019  8:01 AM CST -----  Contact: pt  States she is congested and achy and she would like to see if she can get an antibiotic called in. Pt uses     [facility] [place] - [id number]   [address]  [facility] [address]  Phone: [phone] Fax: [phone]    Please call pt at [phone]. Thank you

## 2019-02-19 NOTE — TELEPHONE ENCOUNTER
Returned call to patient. Patient states she has been congested with body aches for two days, and temperature of 99.8. Patient would like antibiotics sent to [facility] pharmacy. Patient advised that she would need to schedule an appointment to come in and be evaluated so that she could be treated effectively for symptoms she is having. Patient states that she would rather have something sent to the pharmacy without having to come in due to transportation issues and caring for her daughter. Patient informed that with the symptoms that she is having she needs to come in and be seen to rule out the flu so that she does not spread it to her daughter. Patient verbalized understanding and was assisted with scheduling next day appointment with Dr Julio as she declined same day appointment with another provider. Patient would still like to have Tamiflu sent to pharmacy.

## 2019-02-22 ENCOUNTER — TELEPHONE (OUTPATIENT)
Dept: INTERNAL MEDICINE | Facility: CLINIC | Age: 74
End: 2019-02-22

## 2019-02-22 NOTE — TELEPHONE ENCOUNTER
----- Message from Gina Aguayo sent at 2/22/2019  9:00 AM CST -----  Contact: Self- [phone]   Pt states that she is feeling better.  Please call back at [phone].  Thx-AH

## 2019-03-12 DIAGNOSIS — F41.9 ANXIETY: ICD-10-CM

## 2019-03-12 DIAGNOSIS — G47.00 INSOMNIA, UNSPECIFIED TYPE: ICD-10-CM

## 2019-03-12 RX ORDER — ALPRAZOLAM 0.5 MG/1
0.5 TABLET ORAL 3 TIMES DAILY
Qty: 90 TABLET | Refills: 0 | Status: SHIPPED | OUTPATIENT
Start: 2019-03-12 | End: 2019-04-11 | Stop reason: SDUPTHER

## 2019-03-12 NOTE — TELEPHONE ENCOUNTER
----- Message from Ting Mcneal sent at 3/12/2019  9:09 AM CDT -----  Contact: pt  .Type:  RX Refill Request    Who Called:  pt  Refill or New Rx:refill  RX Name and Strength:xanex 0.5mg  How is the patient currently taking it? (ex. 1XDay): 1 tablet 3 times a day  Is this a 30 day or 90 day RX:90 day  Preferred Pharmacy with phone number:   [facility] [address]  [address]  [place] [address]  Phone: [phone] Fax: [phone]  Local or Mail Order: local  Ordering Provider: cralo  Would the patient rather a call back or a response via [facility]? Call back   Best Call Back Number:[phone]  Additional Information: ..

## 2019-03-13 ENCOUNTER — PES CALL (OUTPATIENT)
Dept: ADMINISTRATIVE | Facility: CLINIC | Age: 74
End: 2019-03-13

## 2019-04-11 DIAGNOSIS — E78.1 HYPERTRIGLYCERIDEMIA: ICD-10-CM

## 2019-04-11 DIAGNOSIS — G47.00 INSOMNIA, UNSPECIFIED TYPE: ICD-10-CM

## 2019-04-11 DIAGNOSIS — F41.9 ANXIETY: ICD-10-CM

## 2019-04-11 RX ORDER — FENOFIBRATE 54 MG/1
54 TABLET ORAL DAILY
Qty: 90 TABLET | Refills: 0 | Status: SHIPPED | OUTPATIENT
Start: 2019-04-11 | End: 2019-06-25 | Stop reason: SDUPTHER

## 2019-04-11 RX ORDER — ALPRAZOLAM 0.5 MG/1
0.5 TABLET ORAL 3 TIMES DAILY
Qty: 90 TABLET | Refills: 0 | Status: SHIPPED | OUTPATIENT
Start: 2019-04-11 | End: 2019-05-06 | Stop reason: SDUPTHER

## 2019-05-06 DIAGNOSIS — G47.00 INSOMNIA, UNSPECIFIED TYPE: ICD-10-CM

## 2019-05-06 DIAGNOSIS — I75.021 BLUE TOE SYNDROME OF RIGHT LOWER EXTREMITY: ICD-10-CM

## 2019-05-06 DIAGNOSIS — F41.9 ANXIETY: ICD-10-CM

## 2019-05-06 DIAGNOSIS — I73.9 PAD (PERIPHERAL ARTERY DISEASE): ICD-10-CM

## 2019-05-06 RX ORDER — ALPRAZOLAM 0.5 MG/1
0.5 TABLET ORAL 3 TIMES DAILY
Qty: 90 TABLET | Refills: 0 | Status: SHIPPED | OUTPATIENT
Start: 2019-05-06 | End: 2019-06-12 | Stop reason: SDUPTHER

## 2019-05-06 RX ORDER — CILOSTAZOL 50 MG/1
50 TABLET ORAL 2 TIMES DAILY
Qty: 60 TABLET | Refills: 11 | Status: CANCELLED | OUTPATIENT
Start: 2019-05-06 | End: 2020-05-05

## 2019-05-06 RX ORDER — CILOSTAZOL 50 MG/1
50 TABLET ORAL 2 TIMES DAILY
Qty: 60 TABLET | Refills: 11 | Status: SHIPPED | OUTPATIENT
Start: 2019-05-06 | End: 2020-06-11 | Stop reason: SDUPTHER

## 2019-05-06 RX ORDER — ALPRAZOLAM 0.5 MG/1
0.5 TABLET ORAL 3 TIMES DAILY
Qty: 90 TABLET | Refills: 0 | Status: CANCELLED | OUTPATIENT
Start: 2019-05-06 | End: 2019-06-05

## 2019-05-07 ENCOUNTER — OFFICE VISIT (OUTPATIENT)
Dept: INTERNAL MEDICINE | Facility: CLINIC | Age: 74
End: 2019-05-07
Payer: MEDICARE

## 2019-05-07 VITALS
TEMPERATURE: 97 F | OXYGEN SATURATION: 99 % | DIASTOLIC BLOOD PRESSURE: 60 MMHG | SYSTOLIC BLOOD PRESSURE: 120 MMHG | HEIGHT: 66 IN | BODY MASS INDEX: 22.5 KG/M2 | WEIGHT: 140 LBS | HEART RATE: 92 BPM

## 2019-05-07 DIAGNOSIS — J30.9 ALLERGIC RHINITIS, UNSPECIFIED SEASONALITY, UNSPECIFIED TRIGGER: Primary | ICD-10-CM

## 2019-05-07 DIAGNOSIS — R05.9 COUGH: ICD-10-CM

## 2019-05-07 PROCEDURE — 1101F PT FALLS ASSESS-DOCD LE1/YR: CPT | Mod: CPTII,S$GLB,, | Performed by: NURSE PRACTITIONER

## 2019-05-07 PROCEDURE — 99214 PR OFFICE/OUTPT VISIT, EST, LEVL IV, 30-39 MIN: ICD-10-PCS | Mod: S$GLB,,, | Performed by: NURSE PRACTITIONER

## 2019-05-07 PROCEDURE — 1101F PR PT FALLS ASSESS DOC 0-1 FALLS W/OUT INJ PAST YR: ICD-10-PCS | Mod: CPTII,S$GLB,, | Performed by: NURSE PRACTITIONER

## 2019-05-07 PROCEDURE — 99214 OFFICE O/P EST MOD 30 MIN: CPT | Mod: S$GLB,,, | Performed by: NURSE PRACTITIONER

## 2019-05-07 PROCEDURE — 99999 PR PBB SHADOW E&M-EST. PATIENT-LVL IV: CPT | Mod: PBBFAC,,, | Performed by: NURSE PRACTITIONER

## 2019-05-07 PROCEDURE — 99999 PR PBB SHADOW E&M-EST. PATIENT-LVL IV: ICD-10-PCS | Mod: PBBFAC,,, | Performed by: NURSE PRACTITIONER

## 2019-05-07 RX ORDER — MONTELUKAST SODIUM 10 MG/1
10 TABLET ORAL NIGHTLY
Qty: 30 TABLET | Refills: 0 | Status: SHIPPED | OUTPATIENT
Start: 2019-05-07 | End: 2019-06-06

## 2019-05-07 RX ORDER — FLUTICASONE PROPIONATE 50 MCG
1 SPRAY, SUSPENSION (ML) NASAL DAILY
Qty: 16 G | Refills: 1 | Status: SHIPPED | OUTPATIENT
Start: 2019-05-07 | End: 2020-03-26 | Stop reason: SDUPTHER

## 2019-05-07 RX ORDER — BENZONATATE 100 MG/1
100 CAPSULE ORAL 3 TIMES DAILY PRN
Qty: 30 CAPSULE | Refills: 0 | Status: SHIPPED | OUTPATIENT
Start: 2019-05-07 | End: 2019-05-17

## 2019-05-07 RX ORDER — METHYLPREDNISOLONE 4 MG/1
TABLET ORAL
Qty: 1 PACKAGE | Refills: 0 | Status: SHIPPED | OUTPATIENT
Start: 2019-05-07 | End: 2019-05-28

## 2019-05-07 NOTE — PROGRESS NOTES
Subjective:       Patient ID: Renee Arteaga is a 74 y.o. female.    Chief Complaint: Cough; Chest Congestion; and Fatigue    Cough   This is a new problem. The current episode started in the past 7 days. The problem has been unchanged. The problem occurs every few minutes. The cough is non-productive. Associated symptoms include ear congestion, postnasal drip and rhinorrhea. Pertinent negatives include no chills, fever, headaches, nasal congestion, sore throat or wheezing. Nothing aggravates the symptoms. She has tried OTC cough suppressant for the symptoms. The treatment provided no relief.     Review of Systems   Constitutional: Negative for chills and fever.   HENT: Positive for postnasal drip, rhinorrhea and sneezing. Negative for congestion, sinus pressure, sinus pain and sore throat.    Respiratory: Positive for cough. Negative for wheezing.    Neurological: Negative for headaches.       Objective:      Physical Exam   Constitutional: She is oriented to person, place, and time. She appears well-developed and well-nourished.   HENT:   Head: Normocephalic and atraumatic.   Right Ear: Tympanic membrane and ear canal normal.   Left Ear: Tympanic membrane and ear canal normal.   Nose: Rhinorrhea present. No mucosal edema. Right sinus exhibits no maxillary sinus tenderness and no frontal sinus tenderness. Left sinus exhibits no maxillary sinus tenderness and no frontal sinus tenderness.   Mouth/Throat: Uvula is midline and mucous membranes are normal. Posterior oropharyngeal erythema present.   Eyes: Pupils are equal, round, and reactive to light. Conjunctivae and EOM are normal. Right eye exhibits no discharge. Left eye exhibits no discharge.   Neck: Normal range of motion.   Cardiovascular: Normal rate and regular rhythm. Exam reveals no gallop and no friction rub.   No murmur heard.  Pulmonary/Chest: Effort normal and breath sounds normal. No respiratory distress. She has no wheezes. She has no rales.    Neurological: She is alert and oriented to person, place, and time.   Skin: Skin is warm and dry. No rash noted.       Assessment:       1. Allergic rhinitis, unspecified seasonality, unspecified trigger    2. Cough        Plan:   Allergic rhinitis, unspecified seasonality, unspecified trigger  -     fluticasone propionate (FLONASE) 50 mcg/actuation nasal spray; 1 spray (50 mcg total) by Each Nare route once daily.  Dispense: 16 g; Refill: 1  -     montelukast (SINGULAIR) 10 mg tablet; Take 1 tablet (10 mg total) by mouth every evening.  Dispense: 30 tablet; Refill: 0    Cough  -     benzonatate (TESSALON) 100 MG capsule; Take 1 capsule (100 mg total) by mouth 3 (three) times daily as needed for Cough.  Dispense: 30 capsule; Refill: 0    Other orders  -     methylPREDNISolone (MEDROL DOSEPACK) 4 mg tablet; use as directed  Dispense: 1 Package; Refill: 0      See AVS for further instructions   Follow up if symptoms worsen or fail to improve, for keep routine follow up.

## 2019-05-07 NOTE — PATIENT INSTRUCTIONS
Start flonase nasal spray, one spray per nostril once daily.  singulair at night  Start over the counter mucinex and take with full glass of water.

## 2019-05-29 ENCOUNTER — CLINICAL SUPPORT (OUTPATIENT)
Dept: AUDIOLOGY | Facility: CLINIC | Age: 74
End: 2019-05-29
Payer: MEDICARE

## 2019-05-29 ENCOUNTER — OFFICE VISIT (OUTPATIENT)
Dept: OTOLARYNGOLOGY | Facility: CLINIC | Age: 74
End: 2019-05-29
Payer: MEDICARE

## 2019-05-29 VITALS — HEIGHT: 67 IN | BODY MASS INDEX: 21.82 KG/M2 | WEIGHT: 139 LBS

## 2019-05-29 DIAGNOSIS — H65.193 ACUTE EFFUSION OF BOTH MIDDLE EARS: Primary | ICD-10-CM

## 2019-05-29 DIAGNOSIS — H69.93 DYSFUNCTION OF BOTH EUSTACHIAN TUBES: Primary | ICD-10-CM

## 2019-05-29 DIAGNOSIS — H91.90 PERCEIVED HEARING LOSS: ICD-10-CM

## 2019-05-29 DIAGNOSIS — J31.0 NONALLERGIC RHINITIS: ICD-10-CM

## 2019-05-29 PROCEDURE — 92567 TYMPANOMETRY: CPT | Mod: S$GLB,,, | Performed by: AUDIOLOGIST

## 2019-05-29 PROCEDURE — 99999 PR PBB SHADOW E&M-EST. PATIENT-LVL III: CPT | Mod: PBBFAC,,, | Performed by: PHYSICIAN ASSISTANT

## 2019-05-29 PROCEDURE — 99203 PR OFFICE/OUTPT VISIT, NEW, LEVL III, 30-44 MIN: ICD-10-PCS | Mod: S$GLB,,, | Performed by: PHYSICIAN ASSISTANT

## 2019-05-29 PROCEDURE — 92567 PR TYMPA2METRY: ICD-10-PCS | Mod: S$GLB,,, | Performed by: AUDIOLOGIST

## 2019-05-29 PROCEDURE — 1101F PR PT FALLS ASSESS DOC 0-1 FALLS W/OUT INJ PAST YR: ICD-10-PCS | Mod: CPTII,S$GLB,, | Performed by: PHYSICIAN ASSISTANT

## 2019-05-29 PROCEDURE — 99203 OFFICE O/P NEW LOW 30 MIN: CPT | Mod: S$GLB,,, | Performed by: PHYSICIAN ASSISTANT

## 2019-05-29 PROCEDURE — 1101F PT FALLS ASSESS-DOCD LE1/YR: CPT | Mod: CPTII,S$GLB,, | Performed by: PHYSICIAN ASSISTANT

## 2019-05-29 PROCEDURE — 99999 PR PBB SHADOW E&M-EST. PATIENT-LVL III: ICD-10-PCS | Mod: PBBFAC,,, | Performed by: PHYSICIAN ASSISTANT

## 2019-05-29 RX ORDER — PREDNISONE 20 MG/1
TABLET ORAL
Qty: 21 TABLET | Refills: 0 | Status: SHIPPED | OUTPATIENT
Start: 2019-05-29 | End: 2019-08-05

## 2019-05-29 RX ORDER — IPRATROPIUM BROMIDE 0.5 MG/2.5ML
500 SOLUTION RESPIRATORY (INHALATION) 4 TIMES DAILY
Qty: 1 BOX | Refills: 0 | Status: SHIPPED | OUTPATIENT
Start: 2019-05-29 | End: 2019-05-30 | Stop reason: ALTCHOICE

## 2019-05-29 NOTE — PROGRESS NOTES
Tympanometry revealed Type B in the right ear, and Type B in the left ear.     Right Ear: No peak, with ear canal volume of:1.1ml    Left Ear:  No Peak, with ear canal volume of: 1.2ml    Patient was counseled with results.

## 2019-05-29 NOTE — PROGRESS NOTES
Subjective:       Patient ID: Renee Arteaga is a 74 y.o. female.    Chief Complaint: No chief complaint on file.    Ms. Arteaga is a very pleasant 73 yo female here to see me today with complaints of bilateral hearing loss and fullness after recent URI. She was treated with antibiotics. She also complains of persistent, chronic clear nasal discharge for many years. Worse with eating.     Review of Systems   Constitutional: Negative for chills, fatigue, fever and unexpected weight change.   HENT: Positive for hearing loss and rhinorrhea. Negative for congestion, dental problem, ear discharge, ear pain, facial swelling, nosebleeds, postnasal drip, sinus pressure, sneezing, sore throat, tinnitus, trouble swallowing and voice change.    Eyes: Negative for redness, itching and visual disturbance.   Respiratory: Negative for cough, choking, shortness of breath and wheezing.    Cardiovascular: Negative for chest pain and palpitations.   Gastrointestinal: Negative for abdominal pain.        No reflux.   Musculoskeletal: Negative for gait problem.   Skin: Negative for rash.   Neurological: Negative for dizziness, light-headedness and headaches.       Objective:      Physical Exam   Constitutional: She is oriented to person, place, and time. She appears well-developed and well-nourished. No distress.   HENT:   Head: Normocephalic and atraumatic.   Right Ear: External ear and ear canal normal. A middle ear effusion is present. Decreased hearing is noted.   Left Ear: External ear and ear canal normal. A middle ear effusion is present. Decreased hearing is noted.   Nose: Rhinorrhea present. No mucosal edema, nasal deformity or septal deviation. No epistaxis. Right sinus exhibits no maxillary sinus tenderness and no frontal sinus tenderness. Left sinus exhibits no maxillary sinus tenderness and no frontal sinus tenderness.   Mouth/Throat: Uvula is midline, oropharynx is clear and moist and mucous membranes are normal. Mucous  membranes are not pale and not dry. No dental caries. No oropharyngeal exudate or posterior oropharyngeal erythema.   Eyes: Pupils are equal, round, and reactive to light. Conjunctivae, EOM and lids are normal. Right eye exhibits no chemosis. Left eye exhibits no chemosis. Right conjunctiva is not injected. Left conjunctiva is not injected. No scleral icterus. Right eye exhibits normal extraocular motion and no nystagmus. Left eye exhibits normal extraocular motion and no nystagmus.   Neck: Trachea normal and phonation normal. No tracheal tenderness present. No tracheal deviation present. No thyroid mass and no thyromegaly present.   Cardiovascular: Intact distal pulses.   Pulmonary/Chest: Effort normal. No stridor. No respiratory distress.   Abdominal: She exhibits no distension.   Lymphadenopathy:        Head (right side): No submental, no submandibular, no preauricular, no posterior auricular and no occipital adenopathy present.        Head (left side): No submental, no submandibular, no preauricular, no posterior auricular and no occipital adenopathy present.     She has no cervical adenopathy.   Neurological: She is alert and oriented to person, place, and time. No cranial nerve deficit.   Skin: Skin is warm and dry. No rash noted. No erythema.   Psychiatric: She has a normal mood and affect. Her behavior is normal.       Assessment:       1. Acute effusion of both middle ears    2. Nonallergic rhinitis    3. Perceived hearing loss        Plan:       Bilateral Ear effusion: will treat with course of oral steroids. I would like her to RTC in 3 weeks for recheck and audiogram prior.     Nonallergic rhinitis is a common condition characterized by the chronic presence of onf nasal congestion (stuffiness), rhinorrhea, and postnasal drainage. Clinically, it is distinguished from allergic rhinitis by the the fact that it has onset at a later age and absence of nasal and ocular itching and prominent sneezing.  Typical  triggers in nonallergic rhinitis include irritant odors and strong fragrances, such as tobacco smoke, perfumes, diesel and car exhaust (ie, patients become congested when sitting in traffic), cleaning products, newsprint, changes in temperature, and alcoholic beverages. Subtypes of nonallergic rhinitis include Vasomotor rhinitis, which is characterized by intermittent symptoms of congestion (stuffiness) and/or watery nasal discharge, and an exaggerated reaction to nonspecific irritants, such as air pollution or temperature changes, especially exposure to cold, dry air and gustatory rhinitis, which is an episodic condition with prominent watery rhinorrhea triggered most often by hot or spicy foods and is caused by a vagally-mediated reflex.  Treatment options primarily involve intranasal medications.  Will start the patient on atrovent nose spray.

## 2019-05-30 ENCOUNTER — TELEPHONE (OUTPATIENT)
Dept: OTOLARYNGOLOGY | Facility: CLINIC | Age: 74
End: 2019-05-30

## 2019-05-30 RX ORDER — IPRATROPIUM BROMIDE 21 UG/1
2 SPRAY, METERED NASAL 3 TIMES DAILY
Qty: 30 ML | Refills: 3 | Status: SHIPPED | OUTPATIENT
Start: 2019-05-30 | End: 2019-06-29

## 2019-05-30 NOTE — TELEPHONE ENCOUNTER
Changed order to be Atrovent nasal spray instead of neb; called and left voicemail for pt with this info.  Asked that she call us back with any questions

## 2019-05-30 NOTE — TELEPHONE ENCOUNTER
----- Message from Laila Lux LPN sent at 5/30/2019  3:21 PM CDT -----  Contact: [phone]  Called pt and she was wanting to know if you meant to call in a nebulizer tx instead of a nasal spray.  ----- Message -----  From: Yareli Wang  Sent: 5/30/2019   3:00 PM  To: Nadira Pollock Staff    Pt would like return call form nurse regarding direction for medication. Please call back at [phone]. [facility]

## 2019-06-03 ENCOUNTER — TELEPHONE (OUTPATIENT)
Dept: OTOLARYNGOLOGY | Facility: CLINIC | Age: 74
End: 2019-06-03

## 2019-06-03 NOTE — TELEPHONE ENCOUNTER
----- Message from Donya Small sent at 6/3/2019  2:55 PM CDT -----  Contact: pt  States she can't the prednisone it makes her heart race and it feels like she was going to pass out.  States she's still using the nose drops and her ears are still stopped up. Please call pt at [phone]. Thank you

## 2019-06-04 NOTE — TELEPHONE ENCOUNTER
Good Morning Ms. Arteaga,   I am sorry that you are having these issues. You can spread out the dosing so you don't take all 60 mg at once ( 20, 20,20). However, this may make the insomnia that can be caused by steroids worse. You can discontinue the steroids and take a decongestant and wait it out. It may just take a longer time to resolve.     Hope this helps. Have a great day!     Milagro Waddell PA-C     Above message was routed in error but needs to be conveyed to the pt.  1st attempt to contact pt.  No answer, left voicemail requesting return call.

## 2019-06-12 ENCOUNTER — TELEPHONE (OUTPATIENT)
Dept: OTOLARYNGOLOGY | Facility: CLINIC | Age: 74
End: 2019-06-12

## 2019-06-12 DIAGNOSIS — F41.9 ANXIETY: ICD-10-CM

## 2019-06-12 DIAGNOSIS — G47.00 INSOMNIA, UNSPECIFIED TYPE: ICD-10-CM

## 2019-06-12 RX ORDER — ALPRAZOLAM 0.5 MG/1
0.5 TABLET ORAL 3 TIMES DAILY
Qty: 90 TABLET | Refills: 0 | Status: SHIPPED | OUTPATIENT
Start: 2019-06-12 | End: 2019-07-12 | Stop reason: SDUPTHER

## 2019-06-12 NOTE — TELEPHONE ENCOUNTER
----- Message from Estefany Rosario sent at 6/12/2019 12:24 PM CDT -----  Contact: Pt   Pt is calling regarding requesting to have nurse call pt. Pt states call is concerning antibiotics predniSONE (DELTASONE) 20 MG tablet prescribed  for ear stopped up. Pt states that she was not  able to use antibiotics due to an allergic reaction. Pt heart was running and about to past out. Pt states that she would like another script to be sent over. .[phone] (home)     [facility] [address]  [address]  [place] [address]  Phone: [phone] Fax: [phone]          ..Thank You  Tiffany Rosario

## 2019-06-12 NOTE — TELEPHONE ENCOUNTER
Spoke with pt.  She stopped taking the steroid due to increased heart rate and feeling faint.  States ears still feel like fluid is in them and she is requesting an alternative treatment.

## 2019-06-12 NOTE — TELEPHONE ENCOUNTER
----- Message from Estefany Rosario sent at 6/12/2019 12:22 PM CDT -----  Contact: Pt   Pt is calling regarding requesting .Type:  RX Refill Request    Who Called: Pt   Refill or New Rx: Refill   RX Name and Strength: ALPRAZolam (XANAX) 0.5 MG tablet  How is the patient currently taking it? (ex. 1XDay): 3 times daily   Is this a 30 day or 90 day RX: 90 days   Preferred Pharmacy with phone number: .  [facility] [address]  [address]  [place] [address]  Phone: [phone] Fax: [phone]  Local or Mail Order:local   Ordering Provider: Dr. Julio   Would the patient rather a call back or a response via [facility]?  Call Back   Best Call Back Number: [phone] or         ..Thank You  Tiffany Rosario

## 2019-06-13 NOTE — TELEPHONE ENCOUNTER
Spoke with pt.  Advised of recommendations.  Pt scheduled for audiogram at 9am on 7/1/19 then appt with Dr. Funes at 9:40am.  Pt verbalized understanding of appt details.

## 2019-06-14 ENCOUNTER — TELEPHONE (OUTPATIENT)
Dept: INTERNAL MEDICINE | Facility: CLINIC | Age: 74
End: 2019-06-14

## 2019-06-14 RX ORDER — PRAVASTATIN SODIUM 40 MG/1
40 TABLET ORAL DAILY
Qty: 90 TABLET | Refills: 1 | Status: SHIPPED | OUTPATIENT
Start: 2019-06-14 | End: 2019-11-25 | Stop reason: SDUPTHER

## 2019-06-14 NOTE — TELEPHONE ENCOUNTER
----- Message from Odessa Julio MD sent at 6/14/2019  1:13 PM CDT -----  Can you call to see when she can follow up to discuss statin.   Registry reports she should be on a higher dose statin>     Either follow up to discuss with me or if she is ok with us increasing I will    YLissette JULIO MD     ----- Message -----  From: Sandra Mcginnis LPN  Sent: 6/13/2019   2:54 PM  To: Odessa Julio MD     Statin report for [facility] patients: Statin Therapy for Cardiovascular Dis. Needs High or Moderate-Intensity Statin Medication. Member on too low strength, currently on: Pravastatin 20mg. No follow up on file Please reveiw

## 2019-06-15 NOTE — TELEPHONE ENCOUNTER
Sent medication pravastatin 40 mg to pharmacy.   Please inform us if there is any muscle pain with this increase

## 2019-06-25 DIAGNOSIS — E78.1 HYPERTRIGLYCERIDEMIA: ICD-10-CM

## 2019-06-25 RX ORDER — FENOFIBRATE 54 MG/1
54 TABLET ORAL DAILY
Qty: 90 TABLET | Refills: 1 | Status: SHIPPED | OUTPATIENT
Start: 2019-06-25 | End: 2020-03-05 | Stop reason: SDUPTHER

## 2019-07-12 DIAGNOSIS — F41.9 ANXIETY: ICD-10-CM

## 2019-07-12 DIAGNOSIS — G47.00 INSOMNIA, UNSPECIFIED TYPE: ICD-10-CM

## 2019-07-12 RX ORDER — ALPRAZOLAM 0.5 MG/1
0.5 TABLET ORAL 3 TIMES DAILY
Qty: 90 TABLET | Refills: 0 | Status: SHIPPED | OUTPATIENT
Start: 2019-07-12 | End: 2019-08-13 | Stop reason: SDUPTHER

## 2019-07-15 NOTE — TELEPHONE ENCOUNTER
----- Message from Jordan Murphy sent at 7/15/2019  8:10 AM CDT -----  Contact: self  Type:  RX Refill Request    Who Called: pt  Refill or New Rx:refill  RX Name and Strength:zanex  How is the patient currently taking it? (ex. 1XDay):3xday  Is this a 30 day or 90 day RX:30  Preferred Pharmacy with phone number:  [facility] [address]  [address]  [place] [address]  Phone: [phone] Fax: [phone]  Local or Mail Order:local  Ordering Provider:matos  Would the patient rather a call back or a response via [facility]? Call back  Best Call Back Number:[phone]  Additional Information: pt is currently out of rx.    Thanks,  Jordan Murphy

## 2019-07-15 NOTE — TELEPHONE ENCOUNTER
----- Message from Beth Aguirre sent at 7/15/2019  9:17 AM CDT -----  Contact: self [phone]  .Type:  Patient Returning Call    Who Called:Renee Arteaga  Who Left Message for Patient:Kat  Does the patient know what this is regarding?:no  Would the patient rather a call back or a response via [facility]? Call back  Best Call Back Number:[phone]  Additional Information:

## 2019-07-16 NOTE — MR AVS SNAPSHOT
[facility]  [address] [address]  Phone: [phone]  Fax: [phone]                  Renee Arteaga   1/3/2017 4:20 PM   Office Visit    Description:  Female : 1945   Provider:  Renee Campos NP   Department:  [facility] - Internal Medicine           Reason for Visit     Medication Reaction           Diagnoses this Visit        Comments    Vaccine reaction, initial encounter    -  Primary localized skin reaction; ice 15-20 min 3-4 times daily, tylenol PRN           To Do List           Future Appointments        Provider Department Dept Phone    1/3/2017 4:20 PM Renee Campos NP [facility] [phone]    2017 1:00 PM Elisa Posey NP [facility] - Dermatology [phone]    2017 1:45 PM Annabelle Singh PA-C [facility] [phone]      Goals (5 Years of Data)     None      Follow-Up and Disposition     Return if symptoms worsen or fail to improve.      [facility] On [facility]     [facility] On Call Nurse Care Line -  Assistance  Registered nurses in the [facility] On Call Center provide clinical advisement, health education, appointment booking, and other advisory services.  Call for this free service at [phone].             Medications           Message regarding Medications     Verify the changes and/or additions to your medication regime listed below are the same as discussed with your clinician today.  If any of these changes or additions are incorrect, please notify your healthcare provider.             Verify that the below list of medications is an accurate representation of the medications you are currently taking.  If none reported, the list may be blank. If incorrect, please contact your healthcare provider. Carry this list with you in case of emergency.           Current Medications     aspirin (ECOTRIN) 81 MG EC tablet Take 1 tablet by mouth Daily. 1 Tablet, Delayed Release (E.C.) Oral Every day  "   clonazePAM (KLONOPIN) 0.5 MG tablet Take 1 tablet (0.5 mg total) by mouth every evening.    cyclobenzaprine (FLEXERIL) 5 MG tablet Take 1 tablet (5 mg total) by mouth 3 (three) times daily as needed for Muscle spasms.    fenofibrate (TRICOR) 54 MG tablet Take 1 tablet (54 mg total) by mouth once daily.    metoprolol succinate (TOPROL-XL) 25 MG 24 hr tablet Take 25 mg by mouth once daily.    pantoprazole (PROTONIX) 40 MG tablet Take 1 tablet (40 mg total) by mouth once daily.    pravastatin (PRAVACHOL) 40 MG tablet Take 1 tablet by mouth Daily. 1 Tablet Oral Every day           Clinical Reference Information           Vital Signs - Last Recorded  Most recent update: 1/3/2017 11:23 AM by Laura Alba    BP Pulse Temp Ht    118/70 (BP Location: Right arm, Patient Position: Sitting, BP Method: Manual) 96 97.3 °F (36.3 °C) (Tympanic) 5' 7" (1.702 m)    Wt SpO2 BMI    69.7 kg (153 lb 10.6 oz) 99% 24.07 kg/m2      Blood Pressure          Most Recent Value    BP  118/70      Allergies as of 1/3/2017     No Known Allergies      Immunizations Administered on Date of Encounter - 1/3/2017     None      [facility] Sign-Up     Activating your [facility] account is as easy as 1-2-3!     1) Visit [email or web address], select Sign Up Now, enter this activation code and your date of birth, then select Next.  [email or web address]  Expires: 2/13/2017 11:51 AM      2) Create a username and password to use when you visit [facility] in the future and select a security question in case you lose your password and select Next.    3) Enter your e-mail address and click Sign Up!    Additional Information  If you have questions, please e-mail [email or web address] or call [phone] to talk to our [facility] staff. Remember, [facility] is NOT to be used for urgent needs. For medical emergencies, dial 911.         Instructions    Ice to affected area 15-20 minutes 3-4 times daily. Tylenol as needed.        " Adequate: hears normal conversation without difficulty

## 2019-07-29 ENCOUNTER — OFFICE VISIT (OUTPATIENT)
Dept: OTOLARYNGOLOGY | Facility: CLINIC | Age: 74
End: 2019-07-29
Payer: MEDICARE

## 2019-07-29 ENCOUNTER — CLINICAL SUPPORT (OUTPATIENT)
Dept: AUDIOLOGY | Facility: CLINIC | Age: 74
End: 2019-07-29
Payer: MEDICARE

## 2019-07-29 VITALS
DIASTOLIC BLOOD PRESSURE: 67 MMHG | TEMPERATURE: 98 F | SYSTOLIC BLOOD PRESSURE: 125 MMHG | HEIGHT: 67 IN | HEART RATE: 70 BPM | WEIGHT: 138.88 LBS | BODY MASS INDEX: 21.8 KG/M2

## 2019-07-29 DIAGNOSIS — H90.0 CONDUCTIVE HEARING LOSS, BILATERAL: Primary | ICD-10-CM

## 2019-07-29 DIAGNOSIS — H90.6 MIXED CONDUCTIVE AND SENSORINEURAL HEARING LOSS OF BOTH EARS: Primary | ICD-10-CM

## 2019-07-29 DIAGNOSIS — H65.193 ACUTE EFFUSION OF BOTH MIDDLE EARS: ICD-10-CM

## 2019-07-29 PROCEDURE — 99213 PR OFFICE/OUTPT VISIT, EST, LEVL III, 20-29 MIN: ICD-10-PCS | Mod: S$GLB,,, | Performed by: PHYSICIAN ASSISTANT

## 2019-07-29 PROCEDURE — 1101F PR PT FALLS ASSESS DOC 0-1 FALLS W/OUT INJ PAST YR: ICD-10-PCS | Mod: CPTII,S$GLB,, | Performed by: PHYSICIAN ASSISTANT

## 2019-07-29 PROCEDURE — 1101F PT FALLS ASSESS-DOCD LE1/YR: CPT | Mod: CPTII,S$GLB,, | Performed by: PHYSICIAN ASSISTANT

## 2019-07-29 PROCEDURE — 92567 TYMPANOMETRY: CPT | Mod: S$GLB,,, | Performed by: AUDIOLOGIST

## 2019-07-29 PROCEDURE — 99999 PR PBB SHADOW E&M-EST. PATIENT-LVL III: ICD-10-PCS | Mod: PBBFAC,,, | Performed by: PHYSICIAN ASSISTANT

## 2019-07-29 PROCEDURE — 92557 COMPREHENSIVE HEARING TEST: CPT | Mod: S$GLB,,, | Performed by: AUDIOLOGIST

## 2019-07-29 PROCEDURE — 92557 PR COMPREHENSIVE HEARING TEST: ICD-10-PCS | Mod: S$GLB,,, | Performed by: AUDIOLOGIST

## 2019-07-29 PROCEDURE — 92567 PR TYMPA2METRY: ICD-10-PCS | Mod: S$GLB,,, | Performed by: AUDIOLOGIST

## 2019-07-29 PROCEDURE — 99999 PR PBB SHADOW E&M-EST. PATIENT-LVL III: CPT | Mod: PBBFAC,,, | Performed by: PHYSICIAN ASSISTANT

## 2019-07-29 PROCEDURE — 99213 OFFICE O/P EST LOW 20 MIN: CPT | Mod: S$GLB,,, | Performed by: PHYSICIAN ASSISTANT

## 2019-07-29 NOTE — PROGRESS NOTES
Renee Arteaga was seen 07/29/2019 for an audiological evaluation.  Patient complains of continued bilateral muffled hearing.  She was treated by ENT for otitis media two months ago, but was unable to complete steroid medication due to side effects.  She reports that her hearing is worse today than it was when she was originally seen.     Results reveal a severe mixed hearing loss 250-8000 Hz for the right ear, and  moderate-to profound mixed hearing loss 250-8000 Hz for the left ear.   Speech Reception Thresholds were  65 dBHL for the right ear and 40 dBHL for the left ear.   Word recognition scores were fair for the right ear and good for the left ear.   Tympanograms were Type B for the right ear and Type B for the left ear.    Patient was counseled on the above findings.    REC:  ENT review of audiogram  Repeat audiogram upon ENT request

## 2019-07-29 NOTE — PROGRESS NOTES
Subjective:       Patient ID: Renee Arteaga is a 74 y.o. female.    Chief Complaint: Cerumen Impaction    Ms. Arteaga is a very pleasant 75 yo female here to see me today with complaints of bilateral hearing loss and fullness.I last saw her in May with same issues. I diagnosed her with ear effusions and started her on oral steroids. She was unable to tolerate. Hearing loss is worse.    Review of Systems   Constitutional: Negative for chills, fatigue, fever and unexpected weight change.   HENT: Positive for hearing loss and rhinorrhea. Negative for congestion, dental problem, ear discharge, ear pain, facial swelling, nosebleeds, postnasal drip, sinus pressure, sneezing, sore throat, tinnitus, trouble swallowing and voice change.    Eyes: Negative for redness, itching and visual disturbance.   Respiratory: Negative for cough, choking, shortness of breath and wheezing.    Cardiovascular: Negative for chest pain and palpitations.   Gastrointestinal: Negative for abdominal pain.        No reflux.   Musculoskeletal: Negative for gait problem.   Skin: Negative for rash.   Neurological: Negative for dizziness, light-headedness and headaches.       Objective:      Physical Exam   Constitutional: She is oriented to person, place, and time. She appears well-developed and well-nourished. No distress.   HENT:   Head: Normocephalic and atraumatic.   Right Ear: External ear and ear canal normal. A middle ear effusion is present.   Left Ear: External ear and ear canal normal. A middle ear effusion is present.   Nose: Nose normal. No mucosal edema, rhinorrhea, nasal deformity or septal deviation. No epistaxis. Right sinus exhibits no maxillary sinus tenderness and no frontal sinus tenderness. Left sinus exhibits no maxillary sinus tenderness and no frontal sinus tenderness.   Mouth/Throat: Uvula is midline, oropharynx is clear and moist and mucous membranes are normal. Mucous membranes are not pale and not dry. No dental caries. No  oropharyngeal exudate or posterior oropharyngeal erythema.   Eyes: Pupils are equal, round, and reactive to light. Conjunctivae, EOM and lids are normal. Right eye exhibits no chemosis. Left eye exhibits no chemosis. Right conjunctiva is not injected. Left conjunctiva is not injected. No scleral icterus. Right eye exhibits normal extraocular motion and no nystagmus. Left eye exhibits normal extraocular motion and no nystagmus.   Neck: Trachea normal and phonation normal. No tracheal tenderness present. No tracheal deviation present. No thyroid mass and no thyromegaly present.   Cardiovascular: Intact distal pulses.   Pulmonary/Chest: Effort normal. No stridor. No respiratory distress.   Abdominal: She exhibits no distension.   Lymphadenopathy:        Head (right side): No submental, no submandibular, no preauricular, no posterior auricular and no occipital adenopathy present.        Head (left side): No submental, no submandibular, no preauricular, no posterior auricular and no occipital adenopathy present.     She has no cervical adenopathy.   Neurological: She is alert and oriented to person, place, and time. No cranial nerve deficit.   Skin: Skin is warm and dry. No rash noted. No erythema.   Psychiatric: She has a normal mood and affect. Her behavior is normal.       Assessment:       1. Conductive hearing loss, bilateral    2. Acute effusion of both middle ears        Plan:       We have tried oral steroids but patient was unable to tolerate. She will be scheduled for PET with Dr. Funes next week.

## 2019-08-05 ENCOUNTER — TELEPHONE (OUTPATIENT)
Dept: OTOLARYNGOLOGY | Facility: CLINIC | Age: 74
End: 2019-08-05

## 2019-08-05 ENCOUNTER — PROCEDURE VISIT (OUTPATIENT)
Dept: OTOLARYNGOLOGY | Facility: CLINIC | Age: 74
End: 2019-08-05
Payer: MEDICARE

## 2019-08-05 VITALS
TEMPERATURE: 98 F | HEART RATE: 71 BPM | DIASTOLIC BLOOD PRESSURE: 65 MMHG | WEIGHT: 139.56 LBS | BODY MASS INDEX: 22.19 KG/M2 | SYSTOLIC BLOOD PRESSURE: 117 MMHG

## 2019-08-05 DIAGNOSIS — H65.493 CHRONIC MEE (MIDDLE EAR EFFUSION), BILATERAL: ICD-10-CM

## 2019-08-05 DIAGNOSIS — H90.6 MIXED CONDUCTIVE AND SENSORINEURAL HEARING LOSS OF BOTH EARS: Primary | ICD-10-CM

## 2019-08-05 PROCEDURE — 69433 PR CREATE EARDRUM OPENING,LOCAL ANESTH: ICD-10-PCS | Mod: 50,S$GLB,, | Performed by: ORTHOPAEDIC SURGERY

## 2019-08-05 PROCEDURE — 69433 CREATE EARDRUM OPENING: CPT | Mod: 50,S$GLB,, | Performed by: ORTHOPAEDIC SURGERY

## 2019-08-05 NOTE — PROGRESS NOTES
Subjective:       Patient ID: Renee Arteaga is a 74 y.o. female.    Chief Complaint: Bilateral PE tube placement    Patient is a very pleasant 74-year-old female here today for tube placement in her ears.  She has had a persistent middle ear effusion of both ears for the last several months, and could not tolerate oral steroids.  Risks and benefits of tube placement were discussed in detail, including specifically that this will restore only the conductive portion of her hearing loss not the neural component.  She expressed understanding.    Review of Systems   HENT: Positive for hearing loss.        Objective:      Physical Exam   HENT:   Right Ear: A middle ear effusion is present.   Left Ear: A middle ear effusion is present.         Preprocedure Diagnosis:  Chronic bilateral otitis media with effusion  Postoprocedure Diagnosis:  Same  Procedure:  Bilateral ear tube placement    Findings:  Left ear tympanic membrane bulging and serous effusion, right ear tympanic membrane bulging and serous effusion    Procedure in detail:  After appropriate consents were obtained, the patient was placed on the exam chair in a supine position.  The binocular operating microscope was then brought into the field.    Her right EAC was found to have a small amount of cerumen that was carefully cleaned with a curette.  The tympanic membrane was then visualized, and was found to be bulging and serous effusion.  A small drop of phenol was then applied to the tympanic membrane in the area of anticipated myringotomy.   A radial myringotomy was then made in the anterior-inferior quadrant of the tympanic membrane, and a #5 Metzger tip suction was used to clear the middle ear.  With an alligator forceps, an Gage beveled grommet tube was then placed into the myringotomy site without difficulty.     Her left EAC was found to have a small amount of cerumen that was carefully cleaned with a curette.  The tympanic membrane was then  visualized, and was found to be bulging and serous effusion.  A small drop of phenol was then applied to the tympanic membrane in the area of anticipated myringotomy.   A radial myringotomy was then made in the anterior-inferior quadrant of the tympanic membrane, and a #5 Metzger tip suction was used to clear the middle ear.  With an alligator forceps, an Gage beveled grommet tube was then placed into the myringotomy site without difficulty.     The patient tolerated the procedure without difficulty, and there were no complications.          Assessment:       1. Mixed conductive and sensorineural hearing loss of both ears    2. Chronic RICARDO (middle ear effusion), bilateral        Plan:       1.  Mixed hearing loss:  Bilateral tubes placed today without difficulty or complication.  Discussed that this sort of tube generally stays in place for 6-12 months in extrude spontaneously.  Return to clinic in 6 months for recheck, call for sooner appointment for any ear pain or persistent ear drainage.  Patient did note significant improvement in her hearing following the procedure.

## 2019-08-05 NOTE — TELEPHONE ENCOUNTER
----- Message from India Perkins sent at 8/2/2019  4:06 PM CDT -----  Contact: PATIENT   Patient requesting a call back regarding tubes in ears.Please call back at [phone].        Thanks,  India Perkins

## 2019-08-07 ENCOUNTER — TELEPHONE (OUTPATIENT)
Dept: OTOLARYNGOLOGY | Facility: CLINIC | Age: 74
End: 2019-08-07

## 2019-08-07 NOTE — TELEPHONE ENCOUNTER
Spoke with patient. States her tongue is burning after receiving ear drops during tube placement. Informed her I will check with provider and get back with her. She voice understanding.

## 2019-08-07 NOTE — TELEPHONE ENCOUNTER
----- Message from Rosales Fuentes LPN sent at 8/7/2019  9:52 AM CDT -----  Contact: Pt  Spoke with patient. She states when drops were put in her ear after tube placement her tongue began to burn. She was wondering if you had read up on it and have any advise for her because her tongue is still burning. Thanks.   ----- Message -----  From: Tiffany Lizama  Sent: 8/7/2019   7:52 AM  To: [facility] Staff    Please give pt a call at .[phone] (home) she states that her mouth still feels like she has burning in her mouth.

## 2019-08-07 NOTE — TELEPHONE ENCOUNTER
I conveyed message below to patient. She states she will use Biotin and if she's not improving she will give us a call back.

## 2019-08-07 NOTE — TELEPHONE ENCOUNTER
----- Message from Tiffany Lizama sent at 8/7/2019  7:52 AM CDT -----  Contact: Pt  Please give pt a call at .[phone] (home) she states that her mouth still feels like she has burning in her mouth.

## 2019-08-07 NOTE — TELEPHONE ENCOUNTER
She is talking about the phenol, which was applied to her ear drum before the tube was placed.  No drops were used after the tube was placed, so none of that medication would have been able to enter her oral cavity.  I would recommend she try to use Biotene oral cavity spray and lozenges to see if that helps.  Avoid any alcohol containing mouth wash or rinse as that can in general irritate the oral cavity.

## 2019-08-13 DIAGNOSIS — G47.00 INSOMNIA, UNSPECIFIED TYPE: ICD-10-CM

## 2019-08-13 DIAGNOSIS — F41.9 ANXIETY: ICD-10-CM

## 2019-08-14 RX ORDER — ALPRAZOLAM 0.5 MG/1
0.5 TABLET ORAL 3 TIMES DAILY
Qty: 90 TABLET | Refills: 0 | Status: SHIPPED | OUTPATIENT
Start: 2019-08-14 | End: 2019-09-12 | Stop reason: SDUPTHER

## 2019-08-15 DIAGNOSIS — K21.9 GASTROESOPHAGEAL REFLUX DISEASE WITHOUT ESOPHAGITIS: ICD-10-CM

## 2019-08-16 RX ORDER — PANTOPRAZOLE SODIUM 40 MG/1
40 TABLET, DELAYED RELEASE ORAL DAILY
Qty: 90 TABLET | Refills: 1 | Status: SHIPPED | OUTPATIENT
Start: 2019-08-16 | End: 2019-11-20 | Stop reason: SDUPTHER

## 2019-09-12 ENCOUNTER — TELEPHONE (OUTPATIENT)
Dept: INTERNAL MEDICINE | Facility: CLINIC | Age: 74
End: 2019-09-12

## 2019-09-12 DIAGNOSIS — G47.00 INSOMNIA, UNSPECIFIED TYPE: ICD-10-CM

## 2019-09-12 DIAGNOSIS — F41.9 ANXIETY: ICD-10-CM

## 2019-09-12 RX ORDER — ALPRAZOLAM 0.5 MG/1
0.5 TABLET ORAL 3 TIMES DAILY
Qty: 90 TABLET | Refills: 0 | Status: SHIPPED | OUTPATIENT
Start: 2019-09-12 | End: 2019-10-18 | Stop reason: SDUPTHER

## 2019-09-16 ENCOUNTER — TELEPHONE (OUTPATIENT)
Dept: INTERNAL MEDICINE | Facility: CLINIC | Age: 74
End: 2019-09-16

## 2019-09-16 NOTE — TELEPHONE ENCOUNTER
----- Message from Pamela Roth sent at 9/16/2019 10:09 AM CDT -----  Contact: pt   Type:  RX Refill Request    Who Called: BHAVANA JOHNSON   Refill or New Rx: refill  RX Name and Strength: XANAX 0.5 MG   How is the patient currently taking it? (ex. 1XDay): 3 times daily   Is this a 30 day or 90 day RX: 30 day   Preferred Pharmacy with phone number:     [facility] [address]  [address]  [place] [address]  Phone: [phone] Fax: [phone]    Local or Mail Order: local   Ordering Provider: matos   Would the patient rather a call back or a response via [facility]? Call   Best Call Back Number: [phone] (home)    Additional Information:  Pt is out of this med and need it filled today please

## 2019-10-16 ENCOUNTER — TELEPHONE (OUTPATIENT)
Dept: INTERNAL MEDICINE | Facility: CLINIC | Age: 74
End: 2019-10-16

## 2019-10-16 NOTE — TELEPHONE ENCOUNTER
----- Message from Slime Campos sent at 10/16/2019  8:34 AM CDT -----  Contact: pt   Type:  RX Refill Request    Who Called: Renee Arteaga  Refill or New Rx: refill  RX Name and Strength: cilostazol (PLETAL) 50 MG Tab  How is the patient currently taking it? (ex. 1XDay):   Is this a 30 day or 90 day RX:30  Preferred Pharmacy with phone number:  [place] - [address]  [address]  [place] [address]  Phone: [phone] Fax: [phone]  Local or Mail Order:local   Ordering Provider: Julio   Would the patient rather a call back or a response via [facility]? Call back   Best Call Back Number: [phone] (home)   Additional Information:       Type:  RX Refill Request    Who Called: Renee Arteaga  Refill or New Rx: refill  RX Name and Strength: ALPRAZolam (XANAX) 0.5 MG tablet  How is the patient currently taking it? (ex. 1XDay):   Is this a 30 day or 90 day RX:30  Preferred Pharmacy with phone number:  [place] - [address]  [address]  [place] [address]  Phone: [phone] Fax: [phone]  Local or Mail Order:local   Ordering Provider: Julio   Would the patient rather a call back or a response via [facility]? Call back   Best Call Back Number: [phone] (home)   Additional Information:

## 2019-10-18 ENCOUNTER — OFFICE VISIT (OUTPATIENT)
Dept: INTERNAL MEDICINE | Facility: CLINIC | Age: 74
End: 2019-10-18
Payer: MEDICARE

## 2019-10-18 VITALS
HEIGHT: 67 IN | SYSTOLIC BLOOD PRESSURE: 136 MMHG | DIASTOLIC BLOOD PRESSURE: 70 MMHG | BODY MASS INDEX: 21.48 KG/M2 | OXYGEN SATURATION: 99 % | WEIGHT: 136.88 LBS | TEMPERATURE: 97 F | HEART RATE: 71 BPM

## 2019-10-18 DIAGNOSIS — G47.00 INSOMNIA, UNSPECIFIED TYPE: ICD-10-CM

## 2019-10-18 DIAGNOSIS — F51.01 PRIMARY INSOMNIA: Primary | ICD-10-CM

## 2019-10-18 DIAGNOSIS — F41.9 ANXIETY: ICD-10-CM

## 2019-10-18 DIAGNOSIS — I50.22 CHRONIC SYSTOLIC CONGESTIVE HEART FAILURE: ICD-10-CM

## 2019-10-18 DIAGNOSIS — M46.94 UNSPECIFIED INFLAMMATORY SPONDYLOPATHY, THORACIC REGION: ICD-10-CM

## 2019-10-18 PROCEDURE — 99499 UNLISTED E&M SERVICE: CPT | Mod: S$GLB,,, | Performed by: FAMILY MEDICINE

## 2019-10-18 PROCEDURE — 90662 IIV NO PRSV INCREASED AG IM: CPT | Mod: S$GLB,,, | Performed by: FAMILY MEDICINE

## 2019-10-18 PROCEDURE — 99999 PR PBB SHADOW E&M-EST. PATIENT-LVL IV: ICD-10-PCS | Mod: PBBFAC,,, | Performed by: FAMILY MEDICINE

## 2019-10-18 PROCEDURE — 1101F PR PT FALLS ASSESS DOC 0-1 FALLS W/OUT INJ PAST YR: ICD-10-PCS | Mod: CPTII,S$GLB,, | Performed by: FAMILY MEDICINE

## 2019-10-18 PROCEDURE — 99999 PR PBB SHADOW E&M-EST. PATIENT-LVL IV: CPT | Mod: PBBFAC,,, | Performed by: FAMILY MEDICINE

## 2019-10-18 PROCEDURE — G0008 ADMIN INFLUENZA VIRUS VAC: HCPCS | Mod: S$GLB,,, | Performed by: FAMILY MEDICINE

## 2019-10-18 PROCEDURE — 99213 PR OFFICE/OUTPT VISIT, EST, LEVL III, 20-29 MIN: ICD-10-PCS | Mod: 25,S$GLB,, | Performed by: FAMILY MEDICINE

## 2019-10-18 PROCEDURE — 99213 OFFICE O/P EST LOW 20 MIN: CPT | Mod: 25,S$GLB,, | Performed by: FAMILY MEDICINE

## 2019-10-18 PROCEDURE — 1101F PT FALLS ASSESS-DOCD LE1/YR: CPT | Mod: CPTII,S$GLB,, | Performed by: FAMILY MEDICINE

## 2019-10-18 PROCEDURE — G0008 FLU VACCINE - HIGH DOSE (65+) PRESERVATIVE FREE IM: ICD-10-PCS | Mod: S$GLB,,, | Performed by: FAMILY MEDICINE

## 2019-10-18 PROCEDURE — 90662 FLU VACCINE - HIGH DOSE (65+) PRESERVATIVE FREE IM: ICD-10-PCS | Mod: S$GLB,,, | Performed by: FAMILY MEDICINE

## 2019-10-18 PROCEDURE — 99499 RISK ADDL DX/OHS AUDIT: ICD-10-PCS | Mod: S$GLB,,, | Performed by: FAMILY MEDICINE

## 2019-10-18 RX ORDER — TRAZODONE HYDROCHLORIDE 50 MG/1
50 TABLET ORAL NIGHTLY
Qty: 90 TABLET | Refills: 0 | Status: SHIPPED | OUTPATIENT
Start: 2019-10-18 | End: 2020-01-13 | Stop reason: SDUPTHER

## 2019-10-18 RX ORDER — ALPRAZOLAM 0.5 MG/1
0.5 TABLET ORAL 3 TIMES DAILY
Qty: 90 TABLET | Refills: 0 | Status: SHIPPED | OUTPATIENT
Start: 2019-10-18 | End: 2019-11-18 | Stop reason: SDUPTHER

## 2019-10-18 NOTE — PROGRESS NOTES
Subjective:       Patient ID: Renee Arteaga is a 74 y.o. female.    Chief Complaint: Medication Refill    HPI Ms. Arteaga presents today for medication refill.   Refill on Xanax     Gets up at 12 to 1 am   Not able to sleep long.   She doesn't want anything that will put her in a deep sleep because her adult daughter who is mildly cognitive delayed and has seizures sleeps next to her.     Would like to wait on bone scan    Review of Systems   Constitutional: Negative for activity change, appetite change, fatigue and fever.   HENT: Negative for congestion, ear pain and sinus pressure.    Eyes: Negative for pain and visual disturbance.   Respiratory: Negative for cough, chest tightness and wheezing.    Cardiovascular: Negative for chest pain, palpitations and leg swelling.   Gastrointestinal: Negative for abdominal distention, abdominal pain, constipation, diarrhea, nausea and vomiting.   Endocrine: Negative for polydipsia and polyuria.   Genitourinary: Negative for difficulty urinating, dyspareunia, dysuria, flank pain and hematuria.   Musculoskeletal: Negative for arthralgias and back pain.   Skin: Negative for rash.   Neurological: Negative for dizziness, tremors, syncope, weakness, numbness and headaches.   Psychiatric/Behavioral: Positive for sleep disturbance. Negative for agitation and confusion.           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 She appears well-developed and well-nourished.   HENT:   Head: Normocephalic and atraumatic.   Right Ear: External ear normal.   Left Ear: External ear normal.   Nose: Nose normal.   Cardiovascular: Normal heart sounds.   Pulmonary/Chest: Effort normal and breath sounds normal.   Neurological: She is alert and oriented to person, place, and time.   Vitals reviewed.        Results for orders placed or performed in visit on 01/10/19   Vitamin D   Result Value Ref Range    Vit D, 25-Hydroxy 36 30 - 96 ng/mL   Comprehensive metabolic panel   Result Value Ref Range    Sodium 136 136 - 145 mmol/L    Potassium 4.9 3.5 - 5.1 mmol/L    Chloride 100 95 - 110 mmol/L    CO2 26 23 - 29 mmol/L    Glucose 107 70 - 110 mg/dL    BUN, Bld 15 8 - 23 mg/dL    Creatinine 1.0 0.5 - 1.4 mg/dL    Calcium 9.8 8.7 - 10.5 mg/dL    Total Protein 7.2 6.0 - 8.4 g/dL    Albumin 3.9 3.5 - 5.2 g/dL    Total Bilirubin 0.6 0.1 - 1.0 mg/dL    Alkaline Phosphatase 63 55 - 135 U/L    AST 14 10 - 40 U/L    ALT 8 (L) 10 - 44 U/L    Anion Gap 10 8 - 16 mmol/L    eGFR if African American >60.0 >60 mL/min/1.73 m^2    eGFR if non  56.4 (A) >60 mL/min/1.73 m^2   Lipid panel   Result Value Ref Range    Cholesterol 234 (H) 120 - 199 mg/dL    Triglycerides 101 30 - 150 mg/dL    HDL 73 40 - 75 mg/dL    LDL Cholesterol 140.8 63.0 - 159.0 mg/dL    Hdl/Cholesterol Ratio 31.2 20.0 - 50.0 %    Total Cholesterol/HDL Ratio 3.2 2.0 - 5.0    Non-HDL Cholesterol 161 mg/dL       Assessment/Plan:       Primary insomnia  -     traZODone (DESYREL) 50 MG tablet; Take 1 tablet (50 mg total) by mouth every evening.  Dispense: 90 tablet; Refill: 0    Unspecified inflammatory spondylopathy, thoracic region-stable    Chronic systolic congestive heart failure-stable   Follows with cardiology 1-2 times a year    Anxiety-unchanged  -     ALPRAZolam (XANAX) 0.5 MG tablet; Take 1 tablet (0.5 mg  total) by mouth 3 (three) times daily.  Dispense: 90 tablet; Refill: 0    Insomnia, unspecified type  -     ALPRAZolam (XANAX) 0.5 MG tablet; Take 1 tablet (0.5 mg total) by mouth 3 (three) times daily.  Dispense: 90 tablet; Refill: 0    Other orders  -     Influenza - High Dose (65+) (PF) (IM)      Follow up in about 4 months (around 2/18/2020).    Odessa Julio MD  [facility]   Family Medicine

## 2019-10-19 PROBLEM — I75.021 BLUE TOE SYNDROME OF RIGHT LOWER EXTREMITY: Status: RESOLVED | Noted: 2018-04-12 | Resolved: 2019-10-19

## 2019-10-28 ENCOUNTER — TELEPHONE (OUTPATIENT)
Dept: INTERNAL MEDICINE | Facility: CLINIC | Age: 74
End: 2019-10-28

## 2019-10-28 NOTE — TELEPHONE ENCOUNTER
----- Message from Ainsley Morse sent at 10/28/2019  1:50 PM CDT -----  Contact: pt  States she seen  on the news  a recall on ALPRAZolam (XANAX) 0.5 MG tablet, and said call your doctor if you take this medication. Would like to know what her options is, please give a call back [phone].        Thanks,  Ainsley DESAI

## 2019-11-18 DIAGNOSIS — F41.9 ANXIETY: ICD-10-CM

## 2019-11-18 DIAGNOSIS — G47.00 INSOMNIA, UNSPECIFIED TYPE: ICD-10-CM

## 2019-11-18 NOTE — TELEPHONE ENCOUNTER
----- Message from Sara Webber sent at 11/18/2019 11:33 AM CST -----  Contact: PT   Type:  RX Refill Request    Who Called: Pt   Refill or New Rx:refil  RX Name and Strength:Xanax 0.05 mg   How is the patient currently taking it? (ex. 1XDay):3 times daily  Is this a 30 day or 90 day RX: 30  Preferred Pharmacy with phone number:  [facility] [address]  [address]  [place] [address]  Phone: [phone] Fax: [phone]  Local or Mail Order:local   Ordering Provider:carlo  Would the patient rather a call back or a response via [facility]? Call back   Best Call Back Number:[phone] ([place])   Additional Information: n/a

## 2019-11-19 RX ORDER — ALPRAZOLAM 0.5 MG/1
0.5 TABLET ORAL 3 TIMES DAILY
Qty: 90 TABLET | Refills: 0 | Status: SHIPPED | OUTPATIENT
Start: 2019-11-19 | End: 2019-12-16 | Stop reason: SDUPTHER

## 2019-11-19 NOTE — TELEPHONE ENCOUNTER
----- Message from Justina Jimenes sent at 11/19/2019  8:35 AM CST -----  Contact: Pt  Pt asked that nurse contact her regarding a message which was left regarding her medication.

## 2019-11-20 DIAGNOSIS — K21.9 GASTROESOPHAGEAL REFLUX DISEASE WITHOUT ESOPHAGITIS: ICD-10-CM

## 2019-11-20 RX ORDER — PANTOPRAZOLE SODIUM 40 MG/1
40 TABLET, DELAYED RELEASE ORAL DAILY
Qty: 90 TABLET | Refills: 1 | Status: SHIPPED | OUTPATIENT
Start: 2019-11-20 | End: 2020-08-31 | Stop reason: SDUPTHER

## 2019-11-25 RX ORDER — PRAVASTATIN SODIUM 40 MG/1
40 TABLET ORAL DAILY
Qty: 90 TABLET | Refills: 1 | Status: SHIPPED | OUTPATIENT
Start: 2019-11-25 | End: 2020-05-28 | Stop reason: SDUPTHER

## 2019-12-16 DIAGNOSIS — F41.9 ANXIETY: ICD-10-CM

## 2019-12-16 DIAGNOSIS — G47.00 INSOMNIA, UNSPECIFIED TYPE: ICD-10-CM

## 2019-12-16 RX ORDER — ALPRAZOLAM 0.5 MG/1
0.5 TABLET ORAL 3 TIMES DAILY
Qty: 90 TABLET | Refills: 0 | Status: SHIPPED | OUTPATIENT
Start: 2019-12-16 | End: 2020-01-15 | Stop reason: SDUPTHER

## 2019-12-16 NOTE — TELEPHONE ENCOUNTER
----- Message from Milagro Dominguez sent at 12/16/2019  9:46 AM CST -----  Contact: pt  .Type:  RX Refill Request    Who Called:  pt  Refill or New Rx: refill   RX Name and Strength: xanax   How is the patient currently taking it? (ex. 1XDay):   Is this a 30 day or 90 day RX:   Preferred Pharmacy with phone number:     [facility] [address]  [address]  [place] [address]  Phone: [phone] Fax: [phone]      Local or Mail Order: local   Ordering Provider: Sumanth  Would the patient rather a call back or a response via [facility]? Call back   Best Call Back Number:[phone] (home)     Additional Information:  Pt request a 90 day supply and is out

## 2020-01-06 DIAGNOSIS — R00.2 PALPITATIONS: Primary | ICD-10-CM

## 2020-01-13 DIAGNOSIS — F51.01 PRIMARY INSOMNIA: ICD-10-CM

## 2020-01-13 RX ORDER — TRAZODONE HYDROCHLORIDE 50 MG/1
50 TABLET ORAL NIGHTLY
Qty: 90 TABLET | Refills: 1 | Status: SHIPPED | OUTPATIENT
Start: 2020-01-13 | End: 2021-01-19

## 2020-01-15 DIAGNOSIS — G47.00 INSOMNIA, UNSPECIFIED TYPE: ICD-10-CM

## 2020-01-15 DIAGNOSIS — F41.9 ANXIETY: ICD-10-CM

## 2020-01-15 RX ORDER — ALPRAZOLAM 0.5 MG/1
0.5 TABLET ORAL 3 TIMES DAILY
Qty: 90 TABLET | Refills: 0 | Status: SHIPPED | OUTPATIENT
Start: 2020-01-15 | End: 2020-01-16 | Stop reason: SDUPTHER

## 2020-01-15 NOTE — TELEPHONE ENCOUNTER
----- Message from Yareli Wang sent at 1/15/2020  8:37 AM CST -----  Contact: Self- [phone]  .Type:  RX Refill Request    Who Called: Renee Arteaga  Refill or New Rx:Refill   RX Name and Strength:Xanax 0.5  How is the patient currently taking it? (ex. 1XDay):3xday   Is this a 30 day or 90 day RX:90day   Preferred Pharmacy with phone number:.  [facility] [address]  [address]  [place] [address]  Phone: [phone] Fax: [phone]    Local or Mail Order:Local   Ordering Provider:Julio   Would the patient rather a call back or a response via [facility]? Call back   Best Call Back Number:.[phone] (home)   Additional Information:       Thank You,   Yareli Wang

## 2020-01-16 DIAGNOSIS — G47.00 INSOMNIA, UNSPECIFIED TYPE: ICD-10-CM

## 2020-01-16 DIAGNOSIS — F41.9 ANXIETY: ICD-10-CM

## 2020-01-17 RX ORDER — ALPRAZOLAM 0.5 MG/1
0.5 TABLET ORAL 3 TIMES DAILY
Qty: 90 TABLET | Refills: 1 | Status: SHIPPED | OUTPATIENT
Start: 2020-01-17 | End: 2020-02-12 | Stop reason: SDUPTHER

## 2020-02-12 ENCOUNTER — PATIENT OUTREACH (OUTPATIENT)
Dept: ADMINISTRATIVE | Facility: HOSPITAL | Age: 75
End: 2020-02-12

## 2020-02-12 DIAGNOSIS — G47.00 INSOMNIA, UNSPECIFIED TYPE: ICD-10-CM

## 2020-02-12 DIAGNOSIS — F41.9 ANXIETY: ICD-10-CM

## 2020-02-12 RX ORDER — ALPRAZOLAM 0.5 MG/1
0.5 TABLET ORAL 3 TIMES DAILY
Qty: 90 TABLET | Refills: 1 | Status: SHIPPED | OUTPATIENT
Start: 2020-02-12 | End: 2020-03-05 | Stop reason: SDUPTHER

## 2020-02-12 NOTE — TELEPHONE ENCOUNTER
----- Message from Kaur Vail sent at 2/12/2020  7:36 AM CST -----  Contact: pt  1. What is the name of the medication you are requesting? Xanax ( the pt is out of this medication)   2. What is the dose? n/a  3. How do you take the medication? Orally, topically, etc? Orally  4. How often do you take this medication? n/a  5. Do you need a 30 day or 90 day supply? n/a  6. How many refills are you requesting? n/a  7. What is your preferred pharmacy and location of the pharmacy? [facility]  8. Who can we contact with further questions? The pt request a return call at [phone]

## 2020-02-12 NOTE — PROGRESS NOTES
Chart audit completed.  Discussed dexa scan and offered to schedule appt. Was unable to schedule same day appt so she said she would need to see when she could come in as she has to set up a care giver for her daughter.  Requested she review her calendar and would help schedule appt when she is in to see Dr Julio. She was agreeable with this.

## 2020-03-05 ENCOUNTER — PATIENT OUTREACH (OUTPATIENT)
Dept: ADMINISTRATIVE | Facility: OTHER | Age: 75
End: 2020-03-05

## 2020-03-05 DIAGNOSIS — G47.00 INSOMNIA, UNSPECIFIED TYPE: ICD-10-CM

## 2020-03-05 DIAGNOSIS — E78.1 HYPERTRIGLYCERIDEMIA: ICD-10-CM

## 2020-03-05 DIAGNOSIS — F41.9 ANXIETY: ICD-10-CM

## 2020-03-05 RX ORDER — ALPRAZOLAM 0.5 MG/1
0.5 TABLET ORAL 3 TIMES DAILY
Qty: 90 TABLET | Refills: 1 | Status: SHIPPED | OUTPATIENT
Start: 2020-03-05 | End: 2020-07-30 | Stop reason: SDUPTHER

## 2020-03-05 RX ORDER — FENOFIBRATE 54 MG/1
54 TABLET ORAL DAILY
Qty: 90 TABLET | Refills: 1 | Status: SHIPPED | OUTPATIENT
Start: 2020-03-05 | End: 2020-03-12 | Stop reason: SDUPTHER

## 2020-03-10 ENCOUNTER — PATIENT OUTREACH (OUTPATIENT)
Dept: ADMINISTRATIVE | Facility: HOSPITAL | Age: 75
End: 2020-03-10

## 2020-03-12 ENCOUNTER — OFFICE VISIT (OUTPATIENT)
Dept: INTERNAL MEDICINE | Facility: CLINIC | Age: 75
End: 2020-03-12
Payer: MEDICARE

## 2020-03-12 VITALS
SYSTOLIC BLOOD PRESSURE: 126 MMHG | DIASTOLIC BLOOD PRESSURE: 72 MMHG | BODY MASS INDEX: 21.66 KG/M2 | HEART RATE: 90 BPM | WEIGHT: 138 LBS | HEIGHT: 67 IN | TEMPERATURE: 98 F

## 2020-03-12 DIAGNOSIS — R03.0 ELEVATED BLOOD PRESSURE, SITUATIONAL: ICD-10-CM

## 2020-03-12 DIAGNOSIS — R00.2 HEART PALPITATIONS: Primary | ICD-10-CM

## 2020-03-12 DIAGNOSIS — M16.11 ARTHRITIS OF RIGHT HIP: ICD-10-CM

## 2020-03-12 DIAGNOSIS — I50.22 CHRONIC SYSTOLIC CONGESTIVE HEART FAILURE: ICD-10-CM

## 2020-03-12 DIAGNOSIS — K21.9 GASTROESOPHAGEAL REFLUX DISEASE, ESOPHAGITIS PRESENCE NOT SPECIFIED: ICD-10-CM

## 2020-03-12 DIAGNOSIS — M16.12 ARTHRITIS OF LEFT HIP: ICD-10-CM

## 2020-03-12 DIAGNOSIS — M47.817 LUMBOSACRAL SPONDYLOSIS WITHOUT MYELOPATHY: ICD-10-CM

## 2020-03-12 DIAGNOSIS — E78.1 HYPERTRIGLYCERIDEMIA: ICD-10-CM

## 2020-03-12 DIAGNOSIS — R00.2 HEART PALPITATIONS: ICD-10-CM

## 2020-03-12 PROCEDURE — 1159F MED LIST DOCD IN RCRD: CPT | Mod: S$GLB,,, | Performed by: FAMILY MEDICINE

## 2020-03-12 PROCEDURE — 3074F SYST BP LT 130 MM HG: CPT | Mod: CPTII,S$GLB,, | Performed by: FAMILY MEDICINE

## 2020-03-12 PROCEDURE — 99499 RISK ADDL DX/OHS AUDIT: ICD-10-PCS | Mod: S$GLB,,, | Performed by: FAMILY MEDICINE

## 2020-03-12 PROCEDURE — 1101F PR PT FALLS ASSESS DOC 0-1 FALLS W/OUT INJ PAST YR: ICD-10-PCS | Mod: CPTII,S$GLB,, | Performed by: FAMILY MEDICINE

## 2020-03-12 PROCEDURE — 99214 OFFICE O/P EST MOD 30 MIN: CPT | Mod: S$GLB,,, | Performed by: FAMILY MEDICINE

## 2020-03-12 PROCEDURE — 3074F PR MOST RECENT SYSTOLIC BLOOD PRESSURE < 130 MM HG: ICD-10-PCS | Mod: CPTII,S$GLB,, | Performed by: FAMILY MEDICINE

## 2020-03-12 PROCEDURE — 99214 PR OFFICE/OUTPT VISIT, EST, LEVL IV, 30-39 MIN: ICD-10-PCS | Mod: S$GLB,,, | Performed by: FAMILY MEDICINE

## 2020-03-12 PROCEDURE — 1159F PR MEDICATION LIST DOCUMENTED IN MEDICAL RECORD: ICD-10-PCS | Mod: S$GLB,,, | Performed by: FAMILY MEDICINE

## 2020-03-12 PROCEDURE — 99499 UNLISTED E&M SERVICE: CPT | Mod: S$GLB,,, | Performed by: FAMILY MEDICINE

## 2020-03-12 PROCEDURE — 1101F PT FALLS ASSESS-DOCD LE1/YR: CPT | Mod: CPTII,S$GLB,, | Performed by: FAMILY MEDICINE

## 2020-03-12 PROCEDURE — 99999 PR PBB SHADOW E&M-EST. PATIENT-LVL III: CPT | Mod: PBBFAC,,, | Performed by: FAMILY MEDICINE

## 2020-03-12 PROCEDURE — 1126F AMNT PAIN NOTED NONE PRSNT: CPT | Mod: S$GLB,,, | Performed by: FAMILY MEDICINE

## 2020-03-12 PROCEDURE — 1126F PR PAIN SEVERITY QUANTIFIED, NO PAIN PRESENT: ICD-10-PCS | Mod: S$GLB,,, | Performed by: FAMILY MEDICINE

## 2020-03-12 PROCEDURE — 99999 PR PBB SHADOW E&M-EST. PATIENT-LVL III: ICD-10-PCS | Mod: PBBFAC,,, | Performed by: FAMILY MEDICINE

## 2020-03-12 PROCEDURE — 3078F DIAST BP <80 MM HG: CPT | Mod: CPTII,S$GLB,, | Performed by: FAMILY MEDICINE

## 2020-03-12 PROCEDURE — 3078F PR MOST RECENT DIASTOLIC BLOOD PRESSURE < 80 MM HG: ICD-10-PCS | Mod: CPTII,S$GLB,, | Performed by: FAMILY MEDICINE

## 2020-03-12 RX ORDER — IPRATROPIUM BROMIDE 21 UG/1
SPRAY, METERED NASAL
COMMUNITY
Start: 2020-01-15 | End: 2021-06-16 | Stop reason: SDUPTHER

## 2020-03-12 RX ORDER — METOPROLOL SUCCINATE 25 MG/1
25 TABLET, EXTENDED RELEASE ORAL DAILY
Qty: 90 TABLET | Refills: 1 | Status: SHIPPED | OUTPATIENT
Start: 2020-03-12 | End: 2020-08-31 | Stop reason: SDUPTHER

## 2020-03-12 RX ORDER — FENOFIBRATE 54 MG/1
54 TABLET ORAL DAILY
Qty: 90 TABLET | Refills: 1 | Status: SHIPPED | OUTPATIENT
Start: 2020-03-12 | End: 2020-07-29 | Stop reason: SDUPTHER

## 2020-03-12 RX ORDER — MELOXICAM 15 MG/1
15 TABLET ORAL DAILY
Qty: 90 TABLET | Refills: 1 | Status: SHIPPED | OUTPATIENT
Start: 2020-03-12 | End: 2020-09-01 | Stop reason: SDUPTHER

## 2020-03-12 NOTE — PROGRESS NOTES
Renee Arteaga  03/12/2020  [id number]    Odessa Julio MD  Patient Care Team:  Odessa Julio MD as PCP - General (Internal Medicine)  Sandra Mcginnis LPN as Care Coordinator (Internal Medicine)        Chief Complaint:  Chief Complaint   Patient presents with    elevated blood pressure       History of Present Illness:   Renee Arteaga 74 y.o. female  states that she is under lot of daily stress due to taking care of a special needs family member at home.    She says that 2 nights ago she felt a discomfort and upper mid chest shortly after eating and checked her blood pressure which was 180 systolic.  She took Nova-[place] and the pain resolved within 1-2 minutes and later in the evening her blood pressure was down to normal range and even by the next morning at 1 time was systolic of approximately 105.  The patient states that she has a very old cuff and it has not been checked for accuracy.  She felt like her heart rate had elevated during her high blood pressure episode 2 nights ago but she did not check it at the time.    She is taking her Protonix as directed daily.    She feels that the episode she had was due to anxiety, lack of sleep, and heartburn type feeling after eating.    I have reminded her however to go to the emergency room if she has any chest discomfort that last over minute or 2.  She also knows to follow soon with her primary care physician.  She states that last night and today she feels totally back to normal and her blood pressure is back in normal range at present.    History:  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 4/12/2018    Colon polyp 03/14/2015    Depression     Heart murmur     Hyperlipidemia     Scoliosis     Tachycardia      Past Surgical History:   Procedure Laterality Date    BACK  SURGERY      bladder/rectum lift      BREAST SURGERY      HYSTERECTOMY       Family History   Problem Relation Age of Onset    Arthritis Mother     Lung disease Mother     Heart disease Father      Social History     Socioeconomic History    Marital status:      Spouse name: Not on file    Number of children: Not on file    Years of education: Not on file    Highest education level: Not on file   Occupational History    Not on file   Social Needs    Financial resource strain: Not on file    Food insecurity:     Worry: Not on file     Inability: Not on file    Transportation needs:     Medical: Not on file     Non-medical: Not on file   Tobacco Use    Smoking status: Never Smoker    Smokeless tobacco: Never Used   Substance and Sexual Activity    Alcohol use: No    Drug use: No    Sexual activity: Not on file   Lifestyle    Physical activity:     Days per week: Not on file     Minutes per session: Not on file    Stress: Not on file   Relationships    Social connections:     Talks on phone: Not on file     Gets together: Not on file     Attends Jehov[employer]'s witness service: Not on file     Active member of club or organization: Not on file     Attends meetings of clubs or organizations: Not on file     Relationship status: Not on file   Other Topics Concern    Not on file   Social History Narrative    Not on file     Patient Active Problem List   Diagnosis    Heart palpitations    Dyslipidemia    Idiopathic osteoporosis    Senile osteoporosis    Lumbosacral spondylosis without myelopathy    Arthritis of right hip    Arthritis of left hip    Atherosclerotic PVD with intermittent claudication    Abnormal mammogram of left breast    Unspecified inflammatory spondylopathy, thoracic region    Chronic systolic congestive heart failure    Gastroesophageal reflux disease    Elevated blood pressure, situational     Review of patient's allergies indicates:   Allergen Reactions    Prednisone         The following were reviewed at this visit: active problem list, medication list, allergies, family history, social history, and health maintenance.    Medications:  Current Outpatient Medications on File Prior to Visit   Medication Sig Dispense Refill    ALPRAZolam (XANAX) 0.5 MG tablet Take 1 tablet (0.5 mg total) by mouth 3 (three) times daily. 90 tablet 1    aspirin (ECOTRIN) 81 MG EC tablet Take 1 tablet by mouth Daily. 1 Tablet, Delayed Release (E.C.) Oral Every day      cilostazol (PLETAL) 50 MG Tab Take 1 tablet (50 mg total) by mouth 2 (two) times daily. 60 tablet 11    fenofibrate (TRICOR) 54 MG tablet Take 1 tablet (54 mg total) by mouth once daily. 90 tablet 1    fluticasone propionate (FLONASE) 50 mcg/actuation nasal spray 1 spray (50 mcg total) by Each Nare route once daily. 16 g 1    ipratropium (ATROVENT) 0.03 % nasal spray       meloxicam (MOBIC) 15 MG tablet Take 1 tablet (15 mg total) by mouth once daily. 90 tablet 3    metoprolol succinate (TOPROL-XL) 25 MG 24 hr tablet Take 1 tablet (25 mg total) by mouth once daily. 90 tablet 3    pantoprazole (PROTONIX) 40 MG tablet Take 1 tablet (40 mg total) by mouth once daily. 90 tablet 1    pravastatin (PRAVACHOL) 40 MG tablet Take 1 tablet (40 mg total) by mouth once daily. 1 Tablet Oral Every day 90 tablet 1    traZODone (DESYREL) 50 MG tablet Take 1 tablet (50 mg total) by mouth every evening. 90 tablet 1     No current facility-administered medications on file prior to visit.        Medications have been reviewed and reconciled with patient at this visit.      Exam:  Wt Readings from Last 3 Encounters:   03/12/20 62.6 kg (138 lb 0.1 oz)   10/18/19 62.1 kg (136 lb 14.5 oz)   08/05/19 63.3 kg (139 lb 8.8 oz)     Temp Readings from Last 3 Encounters:   03/12/20 97.5 °F (36.4 °C) (Tympanic)   10/18/19 96.9 °F (36.1 °C) (Tympanic)   08/05/19 97.8 °F (36.6 °C) (Tympanic)     BP Readings from Last 3 Encounters:   03/12/20 126/72   10/18/19  136/70   08/05/19 117/65     Pulse Readings from Last 3 Encounters:   03/12/20 90   10/18/19 71   08/05/19 71     Body mass index is 21.94 kg/m².      Review of Systems   Constitutional: Negative for chills, fever and weight loss.   Respiratory: Negative for shortness of breath.    Cardiovascular: Negative for chest pain and palpitations.     Physical Exam  WNWD, A&O, ambulatory in no acute distress. Pleasant adult female with somewhat anxious demeanor and speech patterns.  Patient also seems to be hard of hearing today and states that she has an ENT problem that is being treated.    Heart regular rate and rhythm  Lungs clear and respiratory effort normal    Psychiatric good affect and cognition      Renee was seen today for elevated blood pressure.    Diagnoses and all orders for this visit:    Heart palpitations    Elevated blood pressure, situational    Gastroesophageal reflux disease, esophagitis presence not specified    Chronic systolic congestive heart failure     25 min spent with patient face-to-face and over 50% time in counseling regarding obtaining new blood pressure machine and managing any further episodes including proceeding to the emergency room if chest pain is severe, last more than a min or 2.    The patient verbalized good understanding of the medical issues discussed today and expressed appreciation for the care provided.  Patient was given the opportunity to ask questions and be an active participant in their medical care. Patient had no further questions or concerns at this time.   The patient was encouraged to participate in appropriate physical activity.    After visit summary was printed and given to patient upon discharge today.  Patient goals and care plan are included in After Visit Summary.    This note was produced with voice recognition software and may have sound a like errors

## 2020-03-12 NOTE — PATIENT INSTRUCTIONS
Recommend to purchase new more accurate and regulated blood pressure cuff for personal use    If chest pain recurs proceed to emergency room for evaluation at that time    Follow-up visits with PCP and other specialist

## 2020-03-26 ENCOUNTER — OFFICE VISIT (OUTPATIENT)
Dept: INTERNAL MEDICINE | Facility: CLINIC | Age: 75
End: 2020-03-26
Payer: MEDICARE

## 2020-03-26 ENCOUNTER — TELEPHONE (OUTPATIENT)
Dept: INTERNAL MEDICINE | Facility: CLINIC | Age: 75
End: 2020-03-26

## 2020-03-26 DIAGNOSIS — M46.94 UNSPECIFIED INFLAMMATORY SPONDYLOPATHY, THORACIC REGION: ICD-10-CM

## 2020-03-26 DIAGNOSIS — I70.219 EXTREMITY ATHEROSCLEROSIS WITH INTERMITTENT CLAUDICATION: ICD-10-CM

## 2020-03-26 DIAGNOSIS — J30.9 ALLERGIC RHINITIS, UNSPECIFIED SEASONALITY, UNSPECIFIED TRIGGER: Primary | ICD-10-CM

## 2020-03-26 PROCEDURE — 99499 RISK ADDL DX/OHS AUDIT: ICD-10-PCS | Mod: 95,,, | Performed by: FAMILY MEDICINE

## 2020-03-26 PROCEDURE — 99499 UNLISTED E&M SERVICE: CPT | Mod: 95,,, | Performed by: FAMILY MEDICINE

## 2020-03-26 PROCEDURE — 99213 OFFICE O/P EST LOW 20 MIN: CPT | Mod: 95,,, | Performed by: FAMILY MEDICINE

## 2020-03-26 PROCEDURE — 1159F PR MEDICATION LIST DOCUMENTED IN MEDICAL RECORD: ICD-10-PCS | Mod: 95,,, | Performed by: FAMILY MEDICINE

## 2020-03-26 PROCEDURE — 1101F PT FALLS ASSESS-DOCD LE1/YR: CPT | Mod: CPTII,95,, | Performed by: FAMILY MEDICINE

## 2020-03-26 PROCEDURE — 1101F PR PT FALLS ASSESS DOC 0-1 FALLS W/OUT INJ PAST YR: ICD-10-PCS | Mod: CPTII,95,, | Performed by: FAMILY MEDICINE

## 2020-03-26 PROCEDURE — 99213 PR OFFICE/OUTPT VISIT, EST, LEVL III, 20-29 MIN: ICD-10-PCS | Mod: 95,,, | Performed by: FAMILY MEDICINE

## 2020-03-26 PROCEDURE — 1159F MED LIST DOCD IN RCRD: CPT | Mod: 95,,, | Performed by: FAMILY MEDICINE

## 2020-03-26 RX ORDER — MONTELUKAST SODIUM 10 MG/1
10 TABLET ORAL NIGHTLY
Qty: 30 TABLET | Refills: 0 | Status: SHIPPED | OUTPATIENT
Start: 2020-03-26 | End: 2020-04-25

## 2020-03-26 RX ORDER — FLUTICASONE PROPIONATE 50 MCG
1 SPRAY, SUSPENSION (ML) NASAL DAILY
Qty: 16 G | Refills: 1 | Status: SHIPPED | OUTPATIENT
Start: 2020-03-26

## 2020-03-26 NOTE — PROGRESS NOTES
The patient location is: [place] (Home)  The chief complaint leading to consultation is: allergy symptoms, postnasal drip  Visit type: Virtual visit with synchronous audio and video  Time start 228  Time end 240  Total time spent with patient: 12 minutes   Each patient to whom he or she provides medical services by telemedicine is:  (1) informed of the relationship between the physician and patient and the respective role of any other health care provider with respect to management of the patient; and (2) notified that he or she may decline to receive medical services by telemedicine and may withdraw from such care at any time.      Subjective:       Patient ID: Renee Arteaga is a 74 y.o. female.    Chief Complaint: No chief complaint on file.    HPI Ms. Arteaga presents via telemedicine for Sinus and allergy problems     Symptoms have been for several weeks   Constant drip that never goes away and gets worse around this time of year.   Doesn't feel bad       Ear is stopped up  Hacking cough  Spit up whith phlegm last night    Body aches     Had similar symptoms last year   Hurts from throat into left ear.     Tessalon did not help  Steroids made heart race     Trazodone made her hyper instead of helping her.     Review of Systems    see HPI for important ROS     Objective:        Physical Exam   Constitutional: She appears well-developed and well-nourished.   HENT:   Head: Normocephalic and atraumatic.         Assessment/Plan:       Allergic rhinitis, unspecified seasonality, unspecified trigger  -     fluticasone propionate (FLONASE) 50 mcg/actuation nasal spray; 1 spray (50 mcg total) by Each Nostril route once daily.  Dispense: 16 g; Refill: 1  -     montelukast (SINGULAIR) 10 mg tablet; Take 1 tablet (10 mg total) by mouth every evening.  Dispense: 30 tablet; Refill: 0    Unspecified inflammatory spondylopathy, thoracic region-stable    Extremity atherosclerosis with intermittent claudication-maintain  healthy blood pressure and cholesterol      Follow up via [facility] next week    Odessa Julio MD  [facility]

## 2020-03-26 NOTE — TELEPHONE ENCOUNTER
----- Message from Princess Conteh sent at 3/26/2020  6:31 AM CDT -----  Contact: Patient  Patient needs to talk to nurse regarding her allergies, patient is not sure what to do, please call her back at [phone]. Tank you

## 2020-05-05 RX ORDER — MONTELUKAST SODIUM 10 MG/1
10 TABLET ORAL NIGHTLY
Qty: 30 TABLET | Refills: 0 | Status: SHIPPED | OUTPATIENT
Start: 2020-05-05 | End: 2020-06-04 | Stop reason: SDUPTHER

## 2020-05-08 ENCOUNTER — TELEPHONE (OUTPATIENT)
Dept: INTERNAL MEDICINE | Facility: CLINIC | Age: 75
End: 2020-05-08

## 2020-05-08 RX ORDER — CETIRIZINE HYDROCHLORIDE, PSEUDOEPHEDRINE HYDROCHLORIDE 5; 120 MG/1; MG/1
1 TABLET, FILM COATED, EXTENDED RELEASE ORAL 2 TIMES DAILY
Qty: 14 TABLET | Refills: 0 | Status: SHIPPED | OUTPATIENT
Start: 2020-05-08 | End: 2020-05-18

## 2020-05-08 NOTE — TELEPHONE ENCOUNTER
----- Message from Princess Conteh sent at 5/8/2020  7:01 AM CDT -----  Contact: Patient  Patient states that she still has a sinus infection and needs something called in, but not a steroid, ear, head stopped up and nose running, please call her back at [phone]. Thank you

## 2020-05-08 NOTE — TELEPHONE ENCOUNTER
Patient requesting non steriod rx for sinus infection, running nose and head stopped up. Last OV 03/26/20. Please advise.

## 2020-05-13 ENCOUNTER — PATIENT OUTREACH (OUTPATIENT)
Dept: ADMINISTRATIVE | Facility: HOSPITAL | Age: 75
End: 2020-05-13

## 2020-05-13 DIAGNOSIS — R92.8 ABNORMAL MAMMOGRAM OF LEFT BREAST: Primary | ICD-10-CM

## 2020-05-13 DIAGNOSIS — Z12.31 ENCOUNTER FOR SCREENING MAMMOGRAM FOR MALIGNANT NEOPLASM OF BREAST: ICD-10-CM

## 2020-05-13 NOTE — PROGRESS NOTES
Called to schedule overdue mammogram. Scheduled for 7/31/2020 at [facility]. Also scheduled for daughter Beckie same day, pt states daughter is handicapped and would need help and would need to go together.  *KDL*

## 2020-05-29 RX ORDER — PRAVASTATIN SODIUM 40 MG/1
40 TABLET ORAL DAILY
Qty: 90 TABLET | Refills: 1 | Status: SHIPPED | OUTPATIENT
Start: 2020-05-29 | End: 2020-11-24 | Stop reason: SDUPTHER

## 2020-06-04 RX ORDER — MONTELUKAST SODIUM 10 MG/1
10 TABLET ORAL NIGHTLY
Qty: 30 TABLET | Refills: 6 | Status: SHIPPED | OUTPATIENT
Start: 2020-06-04 | End: 2020-11-25

## 2020-06-11 DIAGNOSIS — I75.021 BLUE TOE SYNDROME OF RIGHT LOWER EXTREMITY: ICD-10-CM

## 2020-06-11 DIAGNOSIS — I73.9 PAD (PERIPHERAL ARTERY DISEASE): ICD-10-CM

## 2020-06-11 RX ORDER — CILOSTAZOL 50 MG/1
50 TABLET ORAL 2 TIMES DAILY
Qty: 60 TABLET | Refills: 0 | Status: SHIPPED | OUTPATIENT
Start: 2020-06-11 | End: 2020-07-01 | Stop reason: SDUPTHER

## 2020-06-15 ENCOUNTER — CLINICAL SUPPORT (OUTPATIENT)
Dept: AUDIOLOGY | Facility: CLINIC | Age: 75
End: 2020-06-15
Payer: MEDICARE

## 2020-06-15 ENCOUNTER — OFFICE VISIT (OUTPATIENT)
Dept: OTOLARYNGOLOGY | Facility: CLINIC | Age: 75
End: 2020-06-15
Payer: MEDICARE

## 2020-06-15 VITALS
WEIGHT: 138.69 LBS | HEART RATE: 92 BPM | SYSTOLIC BLOOD PRESSURE: 113 MMHG | DIASTOLIC BLOOD PRESSURE: 64 MMHG | TEMPERATURE: 97 F | BODY MASS INDEX: 22.05 KG/M2

## 2020-06-15 DIAGNOSIS — H65.493 CHRONIC MEE (MIDDLE EAR EFFUSION), BILATERAL: Primary | ICD-10-CM

## 2020-06-15 DIAGNOSIS — J31.0 NONALLERGIC RHINITIS: ICD-10-CM

## 2020-06-15 DIAGNOSIS — H91.90 PERCEIVED HEARING LOSS: ICD-10-CM

## 2020-06-15 PROCEDURE — 1159F PR MEDICATION LIST DOCUMENTED IN MEDICAL RECORD: ICD-10-PCS | Mod: S$GLB,,, | Performed by: PHYSICIAN ASSISTANT

## 2020-06-15 PROCEDURE — 1126F PR PAIN SEVERITY QUANTIFIED, NO PAIN PRESENT: ICD-10-PCS | Mod: S$GLB,,, | Performed by: PHYSICIAN ASSISTANT

## 2020-06-15 PROCEDURE — 3078F DIAST BP <80 MM HG: CPT | Mod: CPTII,S$GLB,, | Performed by: PHYSICIAN ASSISTANT

## 2020-06-15 PROCEDURE — 3074F PR MOST RECENT SYSTOLIC BLOOD PRESSURE < 130 MM HG: ICD-10-PCS | Mod: CPTII,S$GLB,, | Performed by: PHYSICIAN ASSISTANT

## 2020-06-15 PROCEDURE — 3074F SYST BP LT 130 MM HG: CPT | Mod: CPTII,S$GLB,, | Performed by: PHYSICIAN ASSISTANT

## 2020-06-15 PROCEDURE — 1101F PR PT FALLS ASSESS DOC 0-1 FALLS W/OUT INJ PAST YR: ICD-10-PCS | Mod: CPTII,S$GLB,, | Performed by: PHYSICIAN ASSISTANT

## 2020-06-15 PROCEDURE — 99999 PR PBB SHADOW E&M-EST. PATIENT-LVL III: ICD-10-PCS | Mod: PBBFAC,,, | Performed by: PHYSICIAN ASSISTANT

## 2020-06-15 PROCEDURE — 1159F MED LIST DOCD IN RCRD: CPT | Mod: S$GLB,,, | Performed by: PHYSICIAN ASSISTANT

## 2020-06-15 PROCEDURE — 99213 PR OFFICE/OUTPT VISIT, EST, LEVL III, 20-29 MIN: ICD-10-PCS | Mod: S$GLB,,, | Performed by: PHYSICIAN ASSISTANT

## 2020-06-15 PROCEDURE — 3078F PR MOST RECENT DIASTOLIC BLOOD PRESSURE < 80 MM HG: ICD-10-PCS | Mod: CPTII,S$GLB,, | Performed by: PHYSICIAN ASSISTANT

## 2020-06-15 PROCEDURE — 1126F AMNT PAIN NOTED NONE PRSNT: CPT | Mod: S$GLB,,, | Performed by: PHYSICIAN ASSISTANT

## 2020-06-15 PROCEDURE — 92567 TYMPANOMETRY: CPT | Mod: S$GLB,,, | Performed by: AUDIOLOGIST-HEARING AID FITTER

## 2020-06-15 PROCEDURE — 99999 PR PBB SHADOW E&M-EST. PATIENT-LVL III: CPT | Mod: PBBFAC,,, | Performed by: PHYSICIAN ASSISTANT

## 2020-06-15 PROCEDURE — 1101F PT FALLS ASSESS-DOCD LE1/YR: CPT | Mod: CPTII,S$GLB,, | Performed by: PHYSICIAN ASSISTANT

## 2020-06-15 PROCEDURE — 99213 OFFICE O/P EST LOW 20 MIN: CPT | Mod: S$GLB,,, | Performed by: PHYSICIAN ASSISTANT

## 2020-06-15 PROCEDURE — 92567 PR TYMPA2METRY: ICD-10-PCS | Mod: S$GLB,,, | Performed by: AUDIOLOGIST-HEARING AID FITTER

## 2020-06-15 RX ORDER — IPRATROPIUM BROMIDE 21 UG/1
2 SPRAY, METERED NASAL 2 TIMES DAILY
Qty: 30 ML | Refills: 0 | Status: SHIPPED | OUTPATIENT
Start: 2020-06-15 | End: 2021-07-20 | Stop reason: SDUPTHER

## 2020-06-15 NOTE — PROGRESS NOTES
Tympanograms were completed 06/15/2020 per Maris Waddell PA-C.  Patient has PET AD.  Has hearing loss AU. Last audiogram was July 2019.    Left ear: Type B, flat.  ECV: 1.5 ml   Right ear: Unable to obtain seal due to patent PET    Recommendations:  1. ENT   2. Audiogram post middle ear management  3. Patient is likely a candidate for hearing aids

## 2020-06-15 NOTE — PROGRESS NOTES
Subjective:   Patient ID: Renee Arteaga is a 75 y.o. female.    Chief Complaint: Cerumen Impaction    Ms. Arteaga is a very pleasant 76 yo female here to see me today with complaints of decreased hearing, full ears ( L>R) and chronic/persistent nasal drainage. She had PET placed last July at bedside by Dr. Funes. She denies any drainage. Her ast audiogram was prior to tube placement. Her nasal drainage is constant and clear- worse when eating. She was unable to tolerated prednisone for last effusion.     Review of patient's allergies indicates:   Allergen Reactions    Prednisone            Review of Systems   Constitutional: Negative for chills, fatigue, fever and unexpected weight change.   HENT: Positive for hearing loss and rhinorrhea (constant/copious). Negative for congestion, dental problem, ear discharge, ear pain, facial swelling, nosebleeds, postnasal drip, sinus pressure, sneezing, sore throat, tinnitus, trouble swallowing and voice change.    Eyes: Negative for redness, itching and visual disturbance.   Respiratory: Negative for cough, choking, shortness of breath and wheezing.    Cardiovascular: Negative for chest pain and palpitations.   Gastrointestinal: Negative for abdominal pain.        No reflux.   Musculoskeletal: Negative for gait problem.   Skin: Negative for rash.   Neurological: Negative for dizziness, light-headedness and headaches.         Objective:   /64   Pulse 92   Temp 97.2 °F (36.2 °C) (Tympanic)   Wt 62.9 kg (138 lb 10.7 oz)   BMI 22.05 kg/m²     Physical Exam  Constitutional:       General: She is not in acute distress.     Appearance: She is well-developed.   HENT:      Head: Normocephalic and atraumatic.      Right Ear: Tympanic membrane, ear canal and external ear normal. Decreased hearing noted. A PE tube is present.      Left Ear: Ear canal and external ear normal. Decreased hearing noted. A middle ear effusion is present.      Nose: Rhinorrhea present. No nasal  deformity, septal deviation or mucosal edema.      Right Sinus: No maxillary sinus tenderness or frontal sinus tenderness.      Left Sinus: No maxillary sinus tenderness or frontal sinus tenderness.      Mouth/Throat:      Mouth: Mucous membranes are not pale and not dry.      Dentition: No dental caries.      Pharynx: Uvula midline. No oropharyngeal exudate or posterior oropharyngeal erythema.   Eyes:      General: Lids are normal. No scleral icterus.     Extraocular Movements:      Right eye: Normal extraocular motion and no nystagmus.      Left eye: Normal extraocular motion and no nystagmus.      Conjunctiva/sclera: Conjunctivae normal.      Right eye: Right conjunctiva is not injected. No chemosis.     Left eye: Left conjunctiva is not injected. No chemosis.     Pupils: Pupils are equal, round, and reactive to light.   Neck:      Thyroid: No thyroid mass or thyromegaly.      Trachea: Trachea and phonation normal. No tracheal tenderness or tracheal deviation.   Pulmonary:      Effort: Pulmonary effort is normal. No respiratory distress.      Breath sounds: No stridor.   Abdominal:      General: There is no distension.   Lymphadenopathy:      Head:      Right side of head: No submental, submandibular, preauricular, posterior auricular or occipital adenopathy.      Left side of head: No submental, submandibular, preauricular, posterior auricular or occipital adenopathy.      Cervical: No cervical adenopathy.   Skin:     General: Skin is warm and dry.      Findings: No erythema or rash.   Neurological:      Mental Status: She is alert and oriented to person, place, and time.      Cranial Nerves: No cranial nerve deficit.   Psychiatric:         Behavior: Behavior normal.              Assessment:     1. Chronic RICARDO (middle ear effusion), bilateral    2. Perceived hearing loss    3. Nonallergic rhinitis        Plan:     Chronic RICARDO (middle ear effusion), bilateral    Perceived hearing loss    Nonallergic  rhinitis    Other orders  -     ipratropium (ATROVENT) 0.03 % nasal spray; 2 sprays by Nasal route 2 (two) times daily.  Dispense: 30 mL; Refill: 0    RICARDO: will plan on PET in OR. She will need audio after procedure      Nonallergic rhinitis is a common condition characterized by the chronic presence of onf nasal congestion (stuffiness), rhinorrhea, and postnasal drainage. Clinically, it is distinguished from allergic rhinitis by the the fact that it has onset at a later age and absence of nasal and ocular itching and prominent sneezing.  Typical triggers in nonallergic rhinitis include irritant odors and strong fragrances, such as tobacco smoke, perfumes, diesel and car exhaust (ie, patients become congested when sitting in traffic), cleaning products, newsprint, changes in temperature, and alcoholic beverages. Subtypes of nonallergic rhinitis include Vasomotor rhinitis, which is characterized by intermittent symptoms of congestion (stuffiness) and/or watery nasal discharge, and an exaggerated reaction to nonspecific irritants, such as air pollution or temperature changes, especially exposure to cold, dry air and gustatory rhinitis, which is an episodic condition with prominent watery rhinorrhea triggered most often by hot or spicy foods and is caused by a vagally-mediated reflex.  Treatment options primarily involve intranasal medications.  Will start the patient on atrovent nasal spray.

## 2020-07-01 DIAGNOSIS — I75.021 BLUE TOE SYNDROME OF RIGHT LOWER EXTREMITY: ICD-10-CM

## 2020-07-01 DIAGNOSIS — I73.9 PAD (PERIPHERAL ARTERY DISEASE): ICD-10-CM

## 2020-07-02 RX ORDER — CILOSTAZOL 50 MG/1
50 TABLET ORAL 2 TIMES DAILY
Qty: 60 TABLET | Refills: 0 | Status: SHIPPED | OUTPATIENT
Start: 2020-07-02 | End: 2020-08-24 | Stop reason: SDUPTHER

## 2020-07-06 ENCOUNTER — TELEPHONE (OUTPATIENT)
Dept: OTOLARYNGOLOGY | Facility: CLINIC | Age: 75
End: 2020-07-06

## 2020-07-06 NOTE — TELEPHONE ENCOUNTER
I tired calling the patient again.  I was able to reach her and get her rescheduled for next Monday, July 13th, at 1:15 (O'Jose A).  The patient verbalized understanding of date, time and location.

## 2020-07-06 NOTE — TELEPHONE ENCOUNTER
----- Message from Pamela Roth sent at 7/6/2020  6:38 AM CDT -----  .Type:  Needs Medical Advice    Who Called: BHAVANA JOHNSON   Symptoms (please be specific):   How long has patient had these symptoms:   Pharmacy name and phone #:   Would the patient rather a call back or a response via [facility]? Call   Best Call Back Number:  [phone] (home)    Additional Information:    Pt is requesting a call back from the nurse in regards to the pt needing to reschedule her PROCEDURE today please

## 2020-07-13 ENCOUNTER — PROCEDURE VISIT (OUTPATIENT)
Dept: OTOLARYNGOLOGY | Facility: CLINIC | Age: 75
End: 2020-07-13
Payer: MEDICARE

## 2020-07-13 VITALS
BODY MASS INDEX: 22.05 KG/M2 | WEIGHT: 138.69 LBS | DIASTOLIC BLOOD PRESSURE: 79 MMHG | HEART RATE: 68 BPM | SYSTOLIC BLOOD PRESSURE: 166 MMHG | TEMPERATURE: 98 F

## 2020-07-13 DIAGNOSIS — H65.492 CHRONIC MEE (MIDDLE EAR EFFUSION), LEFT: ICD-10-CM

## 2020-07-13 PROCEDURE — 69433 PR CREATE EARDRUM OPENING,LOCAL ANESTH: ICD-10-PCS | Mod: LT,S$GLB,, | Performed by: ORTHOPAEDIC SURGERY

## 2020-07-13 PROCEDURE — 69433 CREATE EARDRUM OPENING: CPT | Mod: LT,S$GLB,, | Performed by: ORTHOPAEDIC SURGERY

## 2020-07-13 NOTE — PROCEDURES
Preprocedure Diagnosis:  Chronic left otitis media with effusion  Postoprocedure Diagnosis:  Same  Procedure:  Left ear tube placement    Findings:  Left ear tympanic membrane serous effusion     Procedure in detail:  After appropriate consents were obtained, the patient was placed on the exam chair in a supine position.  The binocular operating microscope was then brought into the field.    Her left EAC was found to have a small amount of cerumen that was carefully cleaned with a curette.  The tympanic membrane was then visualized, and was found to be serous effusion.  A small drop of phenol was then applied to the tympanic membrane in the area of anticipated myringotomy.   A radial myringotomy was then made in the anterior-inferior quadrant of the tympanic membrane, and a #5 Metzger tip suction was used to clear the middle ear.  With an alligator forceps, an Gage beveled grommet tube was then placed into the myringotomy site without difficulty.     The patient tolerated the procedure without difficulty, and there were no complications.    Plan: RTC 6 months, sooner if issues arise.  With her previous procedure she noted an abnormality of her tongue following, which she fortunately did not experience today.

## 2020-07-29 DIAGNOSIS — E78.1 HYPERTRIGLYCERIDEMIA: ICD-10-CM

## 2020-07-29 DIAGNOSIS — G47.00 INSOMNIA, UNSPECIFIED TYPE: ICD-10-CM

## 2020-07-29 DIAGNOSIS — F41.9 ANXIETY: ICD-10-CM

## 2020-07-29 NOTE — TELEPHONE ENCOUNTER
----- Message from Nuria Ruiz sent at 2020  8:45 AM CDT -----  Type:  RX Refill Request    Who Called: Renee  Refill or New Rx:refill  RX Name and Strength:ALPRAZolam (XANAX) 0.5 MG tablet ()///fenofibrate (TRICOR) 54 MG tablet  How is the patient currently taking it? (ex. 1XDay):  Is this a 30 day or 90 day RX:  Preferred Pharmacy with phone number:  [facility] [id number]  [address]  [place] [address]  Phone: [phone] Fax: [phone]      Local or Mail Order:local  Ordering Provider:Sumanth  Would the patient rather a call back or a response via [facility]? call  Best Call Back Number:[phone]    Additional Information:

## 2020-07-30 DIAGNOSIS — F41.9 ANXIETY: ICD-10-CM

## 2020-07-30 DIAGNOSIS — G47.00 INSOMNIA, UNSPECIFIED TYPE: ICD-10-CM

## 2020-07-30 RX ORDER — ALPRAZOLAM 0.5 MG/1
0.5 TABLET ORAL 3 TIMES DAILY
Qty: 90 TABLET | Refills: 1 | OUTPATIENT
Start: 2020-07-30 | End: 2020-08-29

## 2020-07-30 RX ORDER — ALPRAZOLAM 0.5 MG/1
0.5 TABLET ORAL 3 TIMES DAILY
Qty: 90 TABLET | Refills: 0 | Status: SHIPPED | OUTPATIENT
Start: 2020-07-30 | End: 2020-08-25

## 2020-07-30 RX ORDER — FENOFIBRATE 54 MG/1
54 TABLET ORAL DAILY
Qty: 30 TABLET | Refills: 0 | Status: SHIPPED | OUTPATIENT
Start: 2020-07-30 | End: 2020-08-31 | Stop reason: SDUPTHER

## 2020-07-30 NOTE — TELEPHONE ENCOUNTER
"----- Message from Amelia Trujillo sent at 7/30/2020 11:40 AM CDT -----  Type:  RX Refill Request    Who Called: Renee Arteaga  Refill or New Rx: Refill   RX Name and Strength: ALPRAZolam (XANAX) 0.5 MG tablet  How is the patient currently taking it? (ex. 1XDay): Take 1 tablet (0.5 mg total) by mouth 3 (three) times daily. - Oral  Is this a 30 day or 90 day RX: 30 days   Preferred Pharmacy with phone number:  [place] - [address]  [address]  [place] [address]  Phone: [phone] Fax: [phone]    Local or Mail Order:local  Ordering Provider: Migdalia Julio   Would the patient rather a call back or a response via [facility]? Call back   Best Call Back Number:[phone]  Additional Information: patient is requesting for her Rx refill. "Patient stated that she called yesterday 7/29/20; this morning 7/30/20 and would like her medication filled TODAY."    "

## 2020-07-30 NOTE — TELEPHONE ENCOUNTER
----- Message from Yareli Wang sent at 7/30/2020  8:41 AM CDT -----  Regarding: pt  Pt stated she is completely out of her xanax medication , would like to know when can it be refilled. Please call back at .[phone] (home)         Thank you,   Yareli Wang

## 2020-07-30 NOTE — TELEPHONE ENCOUNTER
Needs visit for xanax refill.  MELISA requires patient seen every 3 months.  Offer virtaul.  1 month supply of fenofibrate sent, due for current labs

## 2020-07-30 NOTE — TELEPHONE ENCOUNTER
MIGUEL ÁNGEL for pt stating the I sent her refill request to the provider this morning when she called.I informed pt that we would call the pt when the provider responds.     This is a duplicate request

## 2020-07-31 ENCOUNTER — TELEPHONE (OUTPATIENT)
Dept: ADMINISTRATIVE | Facility: HOSPITAL | Age: 75
End: 2020-07-31

## 2020-07-31 NOTE — TELEPHONE ENCOUNTER
Working Dr. Julio mammo overdue report. Spoke with pt and she will call later to schedule annual/mammogram

## 2020-08-04 ENCOUNTER — TELEPHONE (OUTPATIENT)
Dept: INTERNAL MEDICINE | Facility: CLINIC | Age: 75
End: 2020-08-04

## 2020-08-04 NOTE — TELEPHONE ENCOUNTER
All routine labs. Cbc, cmp, lipid, vitamin d  Has not had labs since 2018.  Do I need to place orders?

## 2020-08-14 ENCOUNTER — PATIENT OUTREACH (OUTPATIENT)
Dept: ADMINISTRATIVE | Facility: HOSPITAL | Age: 75
End: 2020-08-14

## 2020-08-24 DIAGNOSIS — F41.9 ANXIETY: ICD-10-CM

## 2020-08-24 DIAGNOSIS — G47.00 INSOMNIA, UNSPECIFIED TYPE: ICD-10-CM

## 2020-08-24 DIAGNOSIS — I75.021 BLUE TOE SYNDROME OF RIGHT LOWER EXTREMITY: ICD-10-CM

## 2020-08-24 DIAGNOSIS — I73.9 PAD (PERIPHERAL ARTERY DISEASE): ICD-10-CM

## 2020-08-24 NOTE — TELEPHONE ENCOUNTER
----- Message from Maddison Arias sent at 8/24/2020 12:34 PM CDT -----  Contact: pt  Type:  RX Refill Request    Who Called: Renee  Refill or New Rx:refill  RX Name and Strength:ALPRAZolam (XANAX) 0.5 MG tablet  How is the patient currently taking it? (ex. 1XDay):3xday  Is this a 30 day or 90 day RX:30  Preferred Pharmacy with phone number:  [facility] [address]  [address]  [place] [address]  Phone: [phone] Fax: [phone]    Local or Mail Order:local  Ordering Provider:Sumanth  Would the patient rather a call back or a response via [facility]? call  Best Call Back Number:[phone]  Additional Information: pt states that she will be out soon and would like to get prescription today before weather gets bad

## 2020-08-24 NOTE — TELEPHONE ENCOUNTER
----- Message from Laura Man sent at 8/24/2020  8:15 AM CDT -----  Regarding: refill  Contact: pt  Type:  RX Refill Request    Who Called: pt  Refill or New Rx:refill  RX Name and Strength:ALPRAZolam (XANAX) 0.5 MG tablet  How is the patient currently taking it? (ex. 1XDay):3x a day  Is this a 30 day or 90 day RX:30  Preferred Pharmacy with phone number:  [facility] [address]  [address]  [place] [address]  Phone: [phone] Fax: [phone]  Local or Mail Order:local  Ordering Provider:carlo  Would the patient rather a call back or a response via [facility]? Call back  Best Call Back Number:[phone] (home)   Additional Information: caller is  out of the medication and would like to get the refill before the storm.  Thanks.

## 2020-08-25 RX ORDER — CILOSTAZOL 50 MG/1
50 TABLET ORAL 2 TIMES DAILY
Qty: 60 TABLET | Refills: 0 | Status: SHIPPED | OUTPATIENT
Start: 2020-08-25 | End: 2020-09-09 | Stop reason: SDUPTHER

## 2020-08-25 RX ORDER — ALPRAZOLAM 0.5 MG/1
TABLET ORAL
Qty: 30 TABLET | Refills: 0 | Status: SHIPPED | OUTPATIENT
Start: 2020-08-25 | End: 2020-08-31 | Stop reason: SDUPTHER

## 2020-08-25 RX ORDER — ALPRAZOLAM 0.5 MG/1
0.5 TABLET ORAL 3 TIMES DAILY
Qty: 90 TABLET | Refills: 0 | OUTPATIENT
Start: 2020-08-25 | End: 2020-09-24

## 2020-08-25 NOTE — TELEPHONE ENCOUNTER
Spoke to patient and advised her of the prescription sent to the pharmacy. And she would need to scheduled an appointment.

## 2020-08-31 ENCOUNTER — TELEPHONE (OUTPATIENT)
Dept: INTERNAL MEDICINE | Facility: CLINIC | Age: 75
End: 2020-08-31

## 2020-08-31 ENCOUNTER — OFFICE VISIT (OUTPATIENT)
Dept: INTERNAL MEDICINE | Facility: CLINIC | Age: 75
End: 2020-08-31
Payer: MEDICARE

## 2020-08-31 DIAGNOSIS — K21.9 GASTROESOPHAGEAL REFLUX DISEASE WITHOUT ESOPHAGITIS: ICD-10-CM

## 2020-08-31 DIAGNOSIS — R00.2 HEART PALPITATIONS: ICD-10-CM

## 2020-08-31 DIAGNOSIS — G47.00 INSOMNIA, UNSPECIFIED TYPE: ICD-10-CM

## 2020-08-31 DIAGNOSIS — F41.9 ANXIETY: ICD-10-CM

## 2020-08-31 DIAGNOSIS — E78.1 HYPERTRIGLYCERIDEMIA: ICD-10-CM

## 2020-08-31 PROCEDURE — 99213 PR OFFICE/OUTPT VISIT, EST, LEVL III, 20-29 MIN: ICD-10-PCS | Mod: 95,,, | Performed by: FAMILY MEDICINE

## 2020-08-31 PROCEDURE — 99213 OFFICE O/P EST LOW 20 MIN: CPT | Mod: 95,,, | Performed by: FAMILY MEDICINE

## 2020-08-31 PROCEDURE — 1101F PR PT FALLS ASSESS DOC 0-1 FALLS W/OUT INJ PAST YR: ICD-10-PCS | Mod: CPTII,95,, | Performed by: FAMILY MEDICINE

## 2020-08-31 PROCEDURE — 1101F PT FALLS ASSESS-DOCD LE1/YR: CPT | Mod: CPTII,95,, | Performed by: FAMILY MEDICINE

## 2020-08-31 PROCEDURE — 1159F PR MEDICATION LIST DOCUMENTED IN MEDICAL RECORD: ICD-10-PCS | Mod: 95,,, | Performed by: FAMILY MEDICINE

## 2020-08-31 PROCEDURE — 1159F MED LIST DOCD IN RCRD: CPT | Mod: 95,,, | Performed by: FAMILY MEDICINE

## 2020-08-31 RX ORDER — PANTOPRAZOLE SODIUM 40 MG/1
40 TABLET, DELAYED RELEASE ORAL DAILY
Qty: 90 TABLET | Refills: 1 | Status: SHIPPED | OUTPATIENT
Start: 2020-08-31 | End: 2021-02-08

## 2020-08-31 RX ORDER — BUPROPION HYDROCHLORIDE 75 MG/1
75 TABLET ORAL 2 TIMES DAILY
Qty: 60 TABLET | Refills: 1 | Status: SHIPPED | OUTPATIENT
Start: 2020-08-31 | End: 2020-10-29 | Stop reason: SDUPTHER

## 2020-08-31 RX ORDER — METOPROLOL SUCCINATE 25 MG/1
25 TABLET, EXTENDED RELEASE ORAL DAILY
Qty: 90 TABLET | Refills: 1 | Status: SHIPPED | OUTPATIENT
Start: 2020-08-31 | End: 2021-02-23 | Stop reason: SDUPTHER

## 2020-08-31 RX ORDER — FENOFIBRATE 54 MG/1
54 TABLET ORAL DAILY
Qty: 90 TABLET | Refills: 1 | Status: SHIPPED | OUTPATIENT
Start: 2020-08-31 | End: 2021-02-17 | Stop reason: SDUPTHER

## 2020-08-31 RX ORDER — ALPRAZOLAM 0.5 MG/1
0.5 TABLET ORAL 3 TIMES DAILY
Qty: 90 TABLET | Refills: 0 | Status: SHIPPED | OUTPATIENT
Start: 2020-08-31 | End: 2020-10-02 | Stop reason: SDUPTHER

## 2020-08-31 NOTE — TELEPHONE ENCOUNTER
----- Message from Delia Briones sent at 8/31/2020  9:37 AM CDT -----  Contact: pt  Pt states she is still waiting on [facility] for virtual visit 920 patient called at 935am.

## 2020-08-31 NOTE — PROGRESS NOTES
The patient location is: [place] (home)  The chief complaint leading to consultation is: medication refill    Visit type: audiovisual    Face to Face time with patient: 11 minutes    11 minutes of total time spent on the encounter, which includes face to face time and non-face to face time preparing to see the patient (eg, review of tests), Obtaining and/or reviewing separately obtained history, Documenting clinical information in the electronic or other health record, Independently interpreting results (not separately reported) and communicating results to the patient/family/caregiver, or Care coordination (not separately reported).         Each patient to whom he or she provides medical services by telemedicine is:  (1) informed of the relationship between the physician and patient and the respective role of any other health care provider with respect to management of the patient; and (2) notified that he or she may decline to receive medical services by telemedicine and may withdraw from such care at any time.    Notes:       Subjective:       Patient ID: Renee Arteaga is a 75 y.o. female.    Chief Complaint: No chief complaint on file.    HPI Ms. Arteaga presents today for refills.   She would like to get Xanax refilled.   She takes it 3 times a day for a few years.   She has tried not taking a dose in the day but she does not have good days. Most days she takes it three times a day.     Paxil did not work well. Had side effects  Zoloft did not work well     Needs refills on protonix and fenofibrate as well as her toprol    Review of Systems   Constitutional: Negative for activity change and unexpected weight change.   HENT: Negative for hearing loss, rhinorrhea and trouble swallowing.    Eyes: Negative for discharge and visual disturbance.   Respiratory: Negative for chest tightness and wheezing.    Cardiovascular: Negative for chest pain and palpitations.   Gastrointestinal: Negative for blood in stool,  constipation, diarrhea and vomiting.   Endocrine: Negative for polydipsia and polyuria.   Genitourinary: Negative for difficulty urinating, dysuria, hematuria and menstrual problem.   Musculoskeletal: Negative for arthralgias, joint swelling and neck pain.   Neurological: Negative for weakness and headaches.   Psychiatric/Behavioral: Negative for confusion and dysphoric mood.           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 4/12/2018    Colon polyp 03/14/2015    Depression     Heart murmur     Hyperlipidemia     Scoliosis     Tachycardia      Past Surgical History:   Procedure Laterality Date    BACK SURGERY      bladder/rectum lift      BREAST SURGERY      HYSTERECTOMY       Family History   Problem Relation Age of Onset    Arthritis Mother     Lung disease Mother     Heart disease Father      Social History     Socioeconomic History    Marital status:      Spouse name: Not on file    Number of children: Not on file    Years of education: Not on file    Highest education level: Not on file   Occupational History    Not on file   Social Needs    Financial resource strain: Not on file    Food insecurity     Worry: Not on file     Inability: Not on file    Transportation needs     Medical: Not on file     Non-medical: Not on file   Tobacco Use    Smoking status: Never Smoker    Smokeless tobacco: Never Used   Substance and Sexual Activity    Alcohol use: No    Drug use: No    Sexual activity: Not on file   Lifestyle    Physical activity     Days per week: Not on file     Minutes per session: Not on file    Stress: Not on file   Relationships    Social connections     Talks on phone: Not on file     Gets together: Not on file     Attends [employer] service: Not on file     Active member of club or  organization: Not on file     Attends meetings of clubs or organizations: Not on file     Relationship status: Not on file   Other Topics Concern    Not on file   Social History Narrative    Not on file       Objective:        Physical Exam  Constitutional:       Appearance: Normal appearance.   Pulmonary:      Effort: Pulmonary effort is normal.   Neurological:      Mental Status: She is alert.           Assessment/Plan:     Anxiety  -     ALPRAZolam (XANAX) 0.5 MG tablet; Take 1 tablet (0.5 mg total) by mouth 3 (three) times daily.  Dispense: 90 tablet; Refill: 0  -     buPROPion (WELLBUTRIN) 75 MG tablet; Take 1 tablet (75 mg total) by mouth 2 (two) times daily.  Dispense: 60 tablet; Refill: 1    Insomnia, unspecified type  -     ALPRAZolam (XANAX) 0.5 MG tablet; Take 1 tablet (0.5 mg total) by mouth 3 (three) times daily.  Dispense: 90 tablet; Refill: 0    Hypertriglyceridemia  -     fenofibrate (TRICOR) 54 MG tablet; Take 1 tablet (54 mg total) by mouth once daily.  Dispense: 90 tablet; Refill: 1    Gastroesophageal reflux disease without esophagitis  -     pantoprazole (PROTONIX) 40 MG tablet; Take 1 tablet (40 mg total) by mouth once daily.  Dispense: 90 tablet; Refill: 1    Heart palpitations  -     metoprolol succinate (TOPROL-XL) 25 MG 24 hr tablet; Take 1 tablet (25 mg total) by mouth once daily.  Dispense: 90 tablet; Refill: 1          Follow up 6 months or before if needed    Odessa Julio MD  [facility]   Family Medicine

## 2020-09-09 DIAGNOSIS — I75.021 BLUE TOE SYNDROME OF RIGHT LOWER EXTREMITY: ICD-10-CM

## 2020-09-09 DIAGNOSIS — I73.9 PAD (PERIPHERAL ARTERY DISEASE): ICD-10-CM

## 2020-09-09 RX ORDER — CILOSTAZOL 50 MG/1
50 TABLET ORAL 2 TIMES DAILY
Qty: 60 TABLET | Refills: 0 | Status: SHIPPED | OUTPATIENT
Start: 2020-09-09 | End: 2020-10-21 | Stop reason: SDUPTHER

## 2020-09-10 ENCOUNTER — PATIENT OUTREACH (OUTPATIENT)
Dept: ADMINISTRATIVE | Facility: HOSPITAL | Age: 75
End: 2020-09-10

## 2020-09-15 ENCOUNTER — PATIENT MESSAGE (OUTPATIENT)
Dept: INTERNAL MEDICINE | Facility: CLINIC | Age: 75
End: 2020-09-15

## 2020-09-15 DIAGNOSIS — M16.12 ARTHRITIS OF LEFT HIP: ICD-10-CM

## 2020-09-15 DIAGNOSIS — M47.817 LUMBOSACRAL SPONDYLOSIS WITHOUT MYELOPATHY: ICD-10-CM

## 2020-09-15 DIAGNOSIS — M16.11 ARTHRITIS OF RIGHT HIP: ICD-10-CM

## 2020-09-15 RX ORDER — MELOXICAM 15 MG/1
15 TABLET ORAL DAILY
Qty: 90 TABLET | Refills: 0 | Status: SHIPPED | OUTPATIENT
Start: 2020-09-15 | End: 2021-03-15 | Stop reason: SDUPTHER

## 2020-10-02 DIAGNOSIS — F41.9 ANXIETY: ICD-10-CM

## 2020-10-02 DIAGNOSIS — G47.00 INSOMNIA, UNSPECIFIED TYPE: ICD-10-CM

## 2020-10-02 RX ORDER — ALPRAZOLAM 0.5 MG/1
0.5 TABLET ORAL 3 TIMES DAILY
Qty: 90 TABLET | Refills: 0 | Status: SHIPPED | OUTPATIENT
Start: 2020-10-02 | End: 2020-11-02 | Stop reason: SDUPTHER

## 2020-10-02 NOTE — TELEPHONE ENCOUNTER
----- Message from Kaylin Dubose sent at 10/2/2020  1:32 PM CDT -----  Contact: Renee  Type:  RX Refill Request    Who Called:  Renee   Refill or New Rx: Refill   RX Name and Strength: ALPRAZolam (XANAX) 0.5 MG   How is the patient currently taking it? (ex. 1XDay): 3XDay   Is this a 30 day or 90 day RX: 90 day   Preferred Pharmacy with phone number:   [facility] [address]  [address]  [place] [address]  Phone: [phone] Fax: [phone]  Local or Mail Order: local   Ordering Provider: Dr. Mar   Would the patient rather a call back or a response via [facility]? Call back   Best Call Back Number: [phone]  Additional Information: Pt would like a call back for confirmation      Thanks,  Pb

## 2020-10-02 NOTE — TELEPHONE ENCOUNTER
----- Message from Cassy Campos sent at 10/2/2020  2:54 PM CDT -----  Contact: Renee  Type:  RX Refill Request    Who Called: Renee  Refill or New Rx: Refill  RX Name and Strength:ALPRAZolam (XANAX) 0.5 MG tablet  How is the patient currently taking it? (ex. 1XDay): 3XD  Is this a 30 day or 90 day RX: 30  Preferred Pharmacy with phone number:   [facility] [address]  [address]  [place] [address]  Phone: [phone] Fax: [phone]  Local or Mail Order: local  Ordering Provider: Dr. Julio  Would the patient rather a call back or a response via [facility]? Call back  Best Call Back Number: please call [phone]  Additional Information:

## 2020-10-12 ENCOUNTER — TELEPHONE (OUTPATIENT)
Dept: INTERNAL MEDICINE | Facility: CLINIC | Age: 75
End: 2020-10-12

## 2020-10-12 NOTE — TELEPHONE ENCOUNTER
----- Message from Odessa Julio MD sent at 10/12/2020 12:46 PM CDT -----  Contact: self/[phone]    ----- Message -----  From: Roas Dennison  Sent: 10/12/2020  12:37 PM CDT  To: Odessa Julio MD    Would like to schedule a flu and pneumonia shots, please call back at [phone]. Thanks/ar

## 2020-10-13 ENCOUNTER — IMMUNIZATION (OUTPATIENT)
Dept: INTERNAL MEDICINE | Facility: CLINIC | Age: 75
End: 2020-10-13
Payer: MEDICARE

## 2020-10-13 ENCOUNTER — OFFICE VISIT (OUTPATIENT)
Dept: CARDIOLOGY | Facility: CLINIC | Age: 75
End: 2020-10-13
Attending: NUCLEAR MEDICINE
Payer: MEDICARE

## 2020-10-13 ENCOUNTER — HOSPITAL ENCOUNTER (OUTPATIENT)
Dept: CARDIOLOGY | Facility: HOSPITAL | Age: 75
Discharge: HOME | End: 2020-10-13
Attending: NUCLEAR MEDICINE
Payer: MEDICARE

## 2020-10-13 VITALS
OXYGEN SATURATION: 100 % | HEIGHT: 67 IN | BODY MASS INDEX: 21.77 KG/M2 | HEART RATE: 66 BPM | DIASTOLIC BLOOD PRESSURE: 82 MMHG | SYSTOLIC BLOOD PRESSURE: 140 MMHG | WEIGHT: 138.69 LBS

## 2020-10-13 DIAGNOSIS — R00.2 PALPITATIONS: ICD-10-CM

## 2020-10-13 DIAGNOSIS — I70.219 ATHEROSCLEROTIC PVD WITH INTERMITTENT CLAUDICATION: ICD-10-CM

## 2020-10-13 DIAGNOSIS — R00.2 HEART PALPITATIONS: Primary | ICD-10-CM

## 2020-10-13 DIAGNOSIS — E78.5 DYSLIPIDEMIA: Chronic | ICD-10-CM

## 2020-10-13 PROCEDURE — G0008 FLU VACCINE - QUADRIVALENT - ADJUVANTED: ICD-10-PCS | Mod: S$GLB,,, | Performed by: FAMILY MEDICINE

## 2020-10-13 PROCEDURE — G0008 ADMIN INFLUENZA VIRUS VAC: HCPCS | Mod: S$GLB,,, | Performed by: FAMILY MEDICINE

## 2020-10-13 PROCEDURE — 99999 PR PBB SHADOW E&M-EST. PATIENT-LVL II: ICD-10-PCS | Mod: PBBFAC,,,

## 2020-10-13 PROCEDURE — 99214 PR OFFICE/OUTPT VISIT, EST, LEVL IV, 30-39 MIN: ICD-10-PCS | Mod: S$GLB,,, | Performed by: NUCLEAR MEDICINE

## 2020-10-13 PROCEDURE — 3079F DIAST BP 80-89 MM HG: CPT | Mod: CPTII,S$GLB,, | Performed by: NUCLEAR MEDICINE

## 2020-10-13 PROCEDURE — 1159F MED LIST DOCD IN RCRD: CPT | Mod: S$GLB,,, | Performed by: NUCLEAR MEDICINE

## 2020-10-13 PROCEDURE — 99999 PR PBB SHADOW E&M-EST. PATIENT-LVL IV: CPT | Mod: PBBFAC,,, | Performed by: NUCLEAR MEDICINE

## 2020-10-13 PROCEDURE — 3079F PR MOST RECENT DIASTOLIC BLOOD PRESSURE 80-89 MM HG: ICD-10-PCS | Mod: CPTII,S$GLB,, | Performed by: NUCLEAR MEDICINE

## 2020-10-13 PROCEDURE — 99999 PR PBB SHADOW E&M-EST. PATIENT-LVL IV: ICD-10-PCS | Mod: PBBFAC,,, | Performed by: NUCLEAR MEDICINE

## 2020-10-13 PROCEDURE — 93010 ELECTROCARDIOGRAM REPORT: CPT | Mod: ,,, | Performed by: INTERNAL MEDICINE

## 2020-10-13 PROCEDURE — 1101F PT FALLS ASSESS-DOCD LE1/YR: CPT | Mod: CPTII,S$GLB,, | Performed by: NUCLEAR MEDICINE

## 2020-10-13 PROCEDURE — 3077F SYST BP >= 140 MM HG: CPT | Mod: CPTII,S$GLB,, | Performed by: NUCLEAR MEDICINE

## 2020-10-13 PROCEDURE — 99999 PR PBB SHADOW E&M-EST. PATIENT-LVL II: CPT | Mod: PBBFAC,,,

## 2020-10-13 PROCEDURE — 90694 FLU VACCINE - QUADRIVALENT - ADJUVANTED: ICD-10-PCS | Mod: S$GLB,,, | Performed by: FAMILY MEDICINE

## 2020-10-13 PROCEDURE — 99214 OFFICE O/P EST MOD 30 MIN: CPT | Mod: S$GLB,,, | Performed by: NUCLEAR MEDICINE

## 2020-10-13 PROCEDURE — 93005 ELECTROCARDIOGRAM TRACING: CPT

## 2020-10-13 PROCEDURE — 1159F PR MEDICATION LIST DOCUMENTED IN MEDICAL RECORD: ICD-10-PCS | Mod: S$GLB,,, | Performed by: NUCLEAR MEDICINE

## 2020-10-13 PROCEDURE — 90694 VACC AIIV4 NO PRSRV 0.5ML IM: CPT | Mod: S$GLB,,, | Performed by: FAMILY MEDICINE

## 2020-10-13 PROCEDURE — 3008F PR BODY MASS INDEX (BMI) DOCUMENTED: ICD-10-PCS | Mod: CPTII,S$GLB,, | Performed by: NUCLEAR MEDICINE

## 2020-10-13 PROCEDURE — 3008F BODY MASS INDEX DOCD: CPT | Mod: CPTII,S$GLB,, | Performed by: NUCLEAR MEDICINE

## 2020-10-13 PROCEDURE — 93010 EKG 12-LEAD: ICD-10-PCS | Mod: ,,, | Performed by: INTERNAL MEDICINE

## 2020-10-13 PROCEDURE — 3077F PR MOST RECENT SYSTOLIC BLOOD PRESSURE >= 140 MM HG: ICD-10-PCS | Mod: CPTII,S$GLB,, | Performed by: NUCLEAR MEDICINE

## 2020-10-13 PROCEDURE — 1101F PR PT FALLS ASSESS DOC 0-1 FALLS W/OUT INJ PAST YR: ICD-10-PCS | Mod: CPTII,S$GLB,, | Performed by: NUCLEAR MEDICINE

## 2020-10-13 NOTE — PROGRESS NOTES
Subjective:   Patient ID:  Renee Arteaga is a 75 y.o. female who presents for follow-up of Congestive Heart Failure      HPI DOING WELL. NO RECENT HOSPITALIZATIONS OR ED VISITS,  FOR ACS, CARD ARRHYTHMIAS, ADHF OR CVA  PATTERN OF INTERMITTENT CLAUDICATION STABLE  NO ABDOMINAL DISCOMFORT OR PULSATIONS  NO FOCAL CNS SYMPTOMS OR SIGNS TO SUGGEST TIA OR STROKE  NO CHEST DISCOMFORT- ANGINA OR EQUIVALENT  NO EDEMA. NO CALVE TENDERNESS  CARD MED GOOD COMPLIANCE,  NO SIDE EFFECTS  ECG TODAY-SB, 54, SA.  OTHERWISE UNREMARKABLE    Review of Systems   Constitution: Negative for chills, fever, night sweats, weight gain and weight loss.   HENT: Negative for nosebleeds.    Eyes: Negative for blurred vision, double vision and visual disturbance.   Cardiovascular: Negative for chest pain, dyspnea on exertion, irregular heartbeat, leg swelling, orthopnea, palpitations, paroxysmal nocturnal dyspnea and syncope.   Respiratory: Negative for cough, hemoptysis and wheezing.    Endocrine: Negative for polydipsia and polyuria.   Hematologic/Lymphatic: Does not bruise/bleed easily.   Skin: Negative for rash.   Musculoskeletal: Negative for joint pain, joint swelling, muscle weakness and myalgias.   Gastrointestinal: Negative for abdominal pain, hematemesis, jaundice and melena.   Genitourinary: Negative for dysuria, hematuria and nocturia.   Neurological: Negative for dizziness, focal weakness, headaches, sensory change and weakness.   Psychiatric/Behavioral: Negative for depression. The patient does not have insomnia and is not nervous/anxious.      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 4/12/2018    Colon polyp 03/14/2015     Depression     Heart murmur     Hyperlipidemia     Scoliosis     Tachycardia      Social History     Socioeconomic History    Marital status:      Spouse name: Not on file    Number of children: Not on file    Years of education: Not on file    Highest education level: Not on file   Occupational History    Not on file   Social Needs    Financial resource strain: Not on file    Food insecurity     Worry: Not on file     Inability: Not on file    Transportation needs     Medical: Not on file     Non-medical: Not on file   Tobacco Use    Smoking status: Never Smoker    Smokeless tobacco: Never Used   Substance and Sexual Activity    Alcohol use: No    Drug use: No    Sexual activity: Not on file   Lifestyle    Physical activity     Days per week: Not on file     Minutes per session: Not on file    Stress: Not on file   Relationships    Social connections     Talks on phone: Not on file     Gets together: Not on file     Attends Holiness service: Not on file     Active member of club or organization: Not on file     Attends meetings of clubs or organizations: Not on file     Relationship status: Not on file   Other Topics Concern    Not on file   Social History Narrative    Not on file     Current Outpatient Medications on File Prior to Visit   Medication Sig Dispense Refill    ALPRAZolam (XANAX) 0.5 MG tablet Take 1 tablet (0.5 mg total) by mouth 3 (three) times daily. 90 tablet 0    aspirin (ECOTRIN) 81 MG EC tablet Take 1 tablet by mouth Daily. 1 Tablet, Delayed Release (E.C.) Oral Every day      buPROPion (WELLBUTRIN) 75 MG tablet Take 1 tablet (75 mg total) by mouth 2 (two) times daily. 60 tablet 1    cilostazoL (PLETAL) 50 MG Tab Take 1 tablet (50 mg total) by mouth 2 (two) times daily. 60 tablet 0    fenofibrate (TRICOR) 54 MG tablet Take 1 tablet (54 mg total) by mouth once daily. 90 tablet 1    meloxicam (MOBIC) 15 MG tablet Take 1 tablet (15 mg total) by mouth once daily. 90  tablet 0    metoprolol succinate (TOPROL-XL) 25 MG 24 hr tablet Take 1 tablet (25 mg total) by mouth once daily. 90 tablet 1    pantoprazole (PROTONIX) 40 MG tablet Take 1 tablet (40 mg total) by mouth once daily. 90 tablet 1    pravastatin (PRAVACHOL) 40 MG tablet Take 1 tablet (40 mg total) by mouth once daily. 1 Tablet Oral Every day 90 tablet 1    traZODone (DESYREL) 50 MG tablet Take 1 tablet (50 mg total) by mouth every evening. 90 tablet 1    fluticasone propionate (FLONASE) 50 mcg/actuation nasal spray 1 spray (50 mcg total) by Each Nostril route once daily. 16 g 1    ipratropium (ATROVENT) 0.03 % nasal spray       ipratropium (ATROVENT) 0.03 % nasal spray 2 sprays by Nasal route 2 (two) times daily. 30 mL 0     No current facility-administered medications on file prior to visit.      Review of patient's allergies indicates:   Allergen Reactions    Prednisone        Objective:     Physical Exam   Constitutional: She is oriented to person, place, and time. She appears well-developed. No distress.   HENT:   Head: Normocephalic.   Eyes: Pupils are equal, round, and reactive to light. Conjunctivae are normal. No scleral icterus.   Neck: Normal range of motion. Neck supple. Normal carotid pulses, no hepatojugular reflux and no JVD present. Carotid bruit is not present. No edema present. No thyroid mass and no thyromegaly present.   Cardiovascular: Normal rate, regular rhythm, S1 normal, S2 normal, normal heart sounds and intact distal pulses. PMI is not displaced. Exam reveals no gallop and no friction rub.   No murmur heard.  Pulses:       Carotid pulses are 2+ on the right side and 2+ on the left side.       Radial pulses are 2+ on the right side and 2+ on the left side.        Femoral pulses are 2+ on the right side and 2+ on the left side.       Popliteal pulses are 2+ on the right side and 2+ on the left side.        Dorsalis pedis pulses are 2+ on the right side and 2+ on the left side.         Posterior tibial pulses are 2+ on the right side and 2+ on the left side.   Pulmonary/Chest: Effort normal and breath sounds normal. She has no wheezes. She has no rales. She exhibits no tenderness.   Abdominal: Soft. Bowel sounds are normal. She exhibits no pulsatile midline mass and no mass. There is no hepatosplenomegaly. There is no abdominal tenderness.   Musculoskeletal: Normal range of motion.         General: No tenderness or edema.      Cervical back: Normal.      Thoracic back: Normal.      Lumbar back: Normal.   Lymphadenopathy:     She has no cervical adenopathy.     She has no axillary adenopathy.        Right: No supraclavicular adenopathy present.        Left: No supraclavicular adenopathy present.   Neurological: She is alert and oriented to person, place, and time. She has normal strength and normal reflexes. No sensory deficit. Gait normal.   Skin: Skin is warm. No rash noted. No cyanosis. No pallor. Nails show no clubbing.   Psychiatric: She has a normal mood and affect. Her speech is normal and behavior is normal. Cognition and memory are normal.       Assessment:     1. Heart palpitations    2. Atherosclerotic PVD with intermittent claudication    3. Dyslipidemia        Plan:     Heart palpitations  CONTROLLED  NO EXACERBATION OF CARD ARRHYTHMIAS    Atherosclerotic PVD with intermittent claudication  STABLE PATTERN  PLETAL WELL TOLERATED    Dyslipidemia  STATIN WELL TOLERATED    CONTINUE PRESENT CARD MANAGEMENT  RETURN IN ONE YEAR

## 2020-10-21 DIAGNOSIS — I75.021 BLUE TOE SYNDROME OF RIGHT LOWER EXTREMITY: ICD-10-CM

## 2020-10-21 DIAGNOSIS — I73.9 PAD (PERIPHERAL ARTERY DISEASE): ICD-10-CM

## 2020-10-22 RX ORDER — CILOSTAZOL 50 MG/1
50 TABLET ORAL 2 TIMES DAILY
Qty: 60 TABLET | Refills: 5 | Status: SHIPPED | OUTPATIENT
Start: 2020-10-22 | End: 2021-04-12

## 2020-10-28 ENCOUNTER — TELEPHONE (OUTPATIENT)
Dept: ADMINISTRATIVE | Facility: HOSPITAL | Age: 75
End: 2020-10-28

## 2020-10-28 NOTE — TELEPHONE ENCOUNTER
Contacted patient to schedule overdue mammogram. Patient was previously contacted regarding scheduling her daughter mammogram. Patient states that she will call back to schedule her mammogram with her daughters.

## 2020-10-29 DIAGNOSIS — F41.9 ANXIETY: ICD-10-CM

## 2020-10-30 RX ORDER — BUPROPION HYDROCHLORIDE 75 MG/1
75 TABLET ORAL 2 TIMES DAILY
Qty: 60 TABLET | Refills: 3 | Status: SHIPPED | OUTPATIENT
Start: 2020-10-30 | End: 2021-01-19

## 2020-11-02 ENCOUNTER — PATIENT MESSAGE (OUTPATIENT)
Dept: INTERNAL MEDICINE | Facility: CLINIC | Age: 75
End: 2020-11-02

## 2020-11-02 DIAGNOSIS — G47.00 INSOMNIA, UNSPECIFIED TYPE: ICD-10-CM

## 2020-11-02 DIAGNOSIS — F41.9 ANXIETY: ICD-10-CM

## 2020-11-02 RX ORDER — ALPRAZOLAM 0.5 MG/1
0.5 TABLET ORAL 3 TIMES DAILY
Qty: 90 TABLET | Refills: 0 | Status: SHIPPED | OUTPATIENT
Start: 2020-11-02 | End: 2020-11-03

## 2020-11-03 RX ORDER — ALPRAZOLAM 0.5 MG/1
0.5 TABLET ORAL 3 TIMES DAILY
Qty: 90 TABLET | Refills: 0 | Status: SHIPPED | OUTPATIENT
Start: 2020-11-03 | End: 2020-11-30 | Stop reason: SDUPTHER

## 2020-11-24 RX ORDER — PRAVASTATIN SODIUM 40 MG/1
40 TABLET ORAL DAILY
Qty: 90 TABLET | Refills: 1 | Status: SHIPPED | OUTPATIENT
Start: 2020-11-24 | End: 2021-06-08

## 2020-11-30 ENCOUNTER — PATIENT MESSAGE (OUTPATIENT)
Dept: INTERNAL MEDICINE | Facility: CLINIC | Age: 75
End: 2020-11-30

## 2020-11-30 RX ORDER — ALPRAZOLAM 0.5 MG/1
0.5 TABLET ORAL 3 TIMES DAILY
Qty: 90 TABLET | Refills: 0 | Status: SHIPPED | OUTPATIENT
Start: 2020-11-30 | End: 2021-01-06 | Stop reason: SDUPTHER

## 2020-12-01 ENCOUNTER — PATIENT MESSAGE (OUTPATIENT)
Dept: INTERNAL MEDICINE | Facility: CLINIC | Age: 75
End: 2020-12-01

## 2020-12-03 ENCOUNTER — PATIENT OUTREACH (OUTPATIENT)
Dept: ADMINISTRATIVE | Facility: HOSPITAL | Age: 75
End: 2020-12-03

## 2020-12-03 NOTE — PROGRESS NOTES
Working mammogram report; I have contacted patient to schedule overdue mammogram. Appointment scheduled 1-6-21 at [facility].   Appointment reminder mailed out

## 2020-12-24 ENCOUNTER — OFFICE VISIT (OUTPATIENT)
Dept: URGENT CARE | Facility: CLINIC | Age: 75
End: 2020-12-24
Payer: MEDICARE

## 2020-12-24 VITALS
BODY MASS INDEX: 21.03 KG/M2 | HEART RATE: 79 BPM | OXYGEN SATURATION: 100 % | DIASTOLIC BLOOD PRESSURE: 64 MMHG | TEMPERATURE: 99 F | WEIGHT: 132.25 LBS | SYSTOLIC BLOOD PRESSURE: 124 MMHG

## 2020-12-24 DIAGNOSIS — J06.9 VIRAL URI: Primary | ICD-10-CM

## 2020-12-24 DIAGNOSIS — I10 ELEVATED BLOOD PRESSURE READING IN OFFICE WITH DIAGNOSIS OF HYPERTENSION: ICD-10-CM

## 2020-12-24 DIAGNOSIS — Z20.822 EXPOSURE TO COVID-19 VIRUS: ICD-10-CM

## 2020-12-24 DIAGNOSIS — J02.9 SORE THROAT: ICD-10-CM

## 2020-12-24 LAB
CTP QC/QA: YES
SARS-COV-2 RDRP RESP QL NAA+PROBE: NEGATIVE

## 2020-12-24 PROCEDURE — 1159F MED LIST DOCD IN RCRD: CPT | Mod: S$GLB,,, | Performed by: PHYSICIAN ASSISTANT

## 2020-12-24 PROCEDURE — 3074F SYST BP LT 130 MM HG: CPT | Mod: CPTII,S$GLB,, | Performed by: PHYSICIAN ASSISTANT

## 2020-12-24 PROCEDURE — 3074F PR MOST RECENT SYSTOLIC BLOOD PRESSURE < 130 MM HG: ICD-10-PCS | Mod: CPTII,S$GLB,, | Performed by: PHYSICIAN ASSISTANT

## 2020-12-24 PROCEDURE — 3008F PR BODY MASS INDEX (BMI) DOCUMENTED: ICD-10-PCS | Mod: CPTII,S$GLB,, | Performed by: PHYSICIAN ASSISTANT

## 2020-12-24 PROCEDURE — 1101F PR PT FALLS ASSESS DOC 0-1 FALLS W/OUT INJ PAST YR: ICD-10-PCS | Mod: CPTII,S$GLB,, | Performed by: PHYSICIAN ASSISTANT

## 2020-12-24 PROCEDURE — 99999 PR PBB SHADOW E&M-EST. PATIENT-LVL V: CPT | Mod: PBBFAC,,, | Performed by: PHYSICIAN ASSISTANT

## 2020-12-24 PROCEDURE — 1126F AMNT PAIN NOTED NONE PRSNT: CPT | Mod: S$GLB,,, | Performed by: PHYSICIAN ASSISTANT

## 2020-12-24 PROCEDURE — 3288F PR FALLS RISK ASSESSMENT DOCUMENTED: ICD-10-PCS | Mod: CPTII,S$GLB,, | Performed by: PHYSICIAN ASSISTANT

## 2020-12-24 PROCEDURE — 3078F PR MOST RECENT DIASTOLIC BLOOD PRESSURE < 80 MM HG: ICD-10-PCS | Mod: CPTII,S$GLB,, | Performed by: PHYSICIAN ASSISTANT

## 2020-12-24 PROCEDURE — U0002 COVID-19 LAB TEST NON-CDC: HCPCS | Mod: QW,S$GLB,, | Performed by: PHYSICIAN ASSISTANT

## 2020-12-24 PROCEDURE — 99214 PR OFFICE/OUTPT VISIT, EST, LEVL IV, 30-39 MIN: ICD-10-PCS | Mod: S$GLB,CS,, | Performed by: PHYSICIAN ASSISTANT

## 2020-12-24 PROCEDURE — 1101F PT FALLS ASSESS-DOCD LE1/YR: CPT | Mod: CPTII,S$GLB,, | Performed by: PHYSICIAN ASSISTANT

## 2020-12-24 PROCEDURE — 3078F DIAST BP <80 MM HG: CPT | Mod: CPTII,S$GLB,, | Performed by: PHYSICIAN ASSISTANT

## 2020-12-24 PROCEDURE — 99999 PR PBB SHADOW E&M-EST. PATIENT-LVL V: ICD-10-PCS | Mod: PBBFAC,,, | Performed by: PHYSICIAN ASSISTANT

## 2020-12-24 PROCEDURE — 1126F PR PAIN SEVERITY QUANTIFIED, NO PAIN PRESENT: ICD-10-PCS | Mod: S$GLB,,, | Performed by: PHYSICIAN ASSISTANT

## 2020-12-24 PROCEDURE — U0002: ICD-10-PCS | Mod: QW,S$GLB,, | Performed by: PHYSICIAN ASSISTANT

## 2020-12-24 PROCEDURE — 99214 OFFICE O/P EST MOD 30 MIN: CPT | Mod: S$GLB,CS,, | Performed by: PHYSICIAN ASSISTANT

## 2020-12-24 PROCEDURE — 1159F PR MEDICATION LIST DOCUMENTED IN MEDICAL RECORD: ICD-10-PCS | Mod: S$GLB,,, | Performed by: PHYSICIAN ASSISTANT

## 2020-12-24 PROCEDURE — 3008F BODY MASS INDEX DOCD: CPT | Mod: CPTII,S$GLB,, | Performed by: PHYSICIAN ASSISTANT

## 2020-12-24 PROCEDURE — 3288F FALL RISK ASSESSMENT DOCD: CPT | Mod: CPTII,S$GLB,, | Performed by: PHYSICIAN ASSISTANT

## 2020-12-24 NOTE — PROGRESS NOTES
Renee Arteaga is a 75 y.o. female who presents after close exposure to COVID 19.  They are presenting with symptoms of sore throat, cough, headache, swollen lymph nodes for 3 days  Denies fever, night sweats, body aches chills, anosmia, N/V/D.  Desires testing today.    All areas of patients chart reviewed including past medical history, past surgical history, medications, allergies, family history, and social history.    Review of Systems   Constitutional: Negative for chills and fever.   HENT: Positive for congestion and sore throat.    Respiratory: Positive for cough. Negative for shortness of breath.    Cardiovascular: Negative for chest pain.   Gastrointestinal: Negative for abdominal pain and nausea.   Genitourinary: Negative for dysuria and frequency.   Musculoskeletal: Negative for back pain and myalgias.   Neurological: Positive for headaches.   All other systems reviewed and are negative.      Physical Exam:  /64   Pulse 79   Temp 98.6 °F (37 °C) (Temporal)   Wt 60 kg (132 lb 4.4 oz)   SpO2 100%   BMI 21.03 kg/m²     Physical Exam   Constitutional: She is oriented to person, place, and time and well-developed, well-nourished, and in no distress.   HENT:   Head: Normocephalic.   Right Ear: Hearing, external ear and ear canal normal.   Left Ear: Hearing, external ear and ear canal normal.   Nose: Mucosal edema and rhinorrhea present. Right sinus exhibits no maxillary sinus tenderness and no frontal sinus tenderness. Left sinus exhibits no maxillary sinus tenderness and no frontal sinus tenderness.   Mouth/Throat: Uvula is midline and mucous membranes are normal. Posterior oropharyngeal erythema present. No oropharyngeal exudate, posterior oropharyngeal edema or tonsillar abscesses.   Left TM with tube in place.  Right TM with hole where TM was previously.   Neck: Normal range of motion.   Cardiovascular: Normal rate and normal heart sounds.   Pulmonary/Chest: Effort normal and breath sounds  normal. No respiratory distress.   Neurological: She is alert and oriented to person, place, and time.   Skin: Skin is warm.   Psychiatric: Affect normal.   Vitals reviewed.      Assessment/Plan:  1. Exposure to COVID-19 virus  - POCT COVID-19 Rapid Screening    2. Sore throat    3. Elevated blood pressure reading in office with diagnosis of hypertension    4. Viral URI      Rapid COVID test today is negative.    You have tested negative for COVID-19 today.  If you did not have any close exposure as defined below, then effective today, you can return to your normal daily activities including social distancing, wearing masks, and frequent handwashing.    A close exposure is defined as anyone who had a masked or an unmasked exposure to a known COVID -19 positive person, at less than 6 ft for more than 15 minutes.  If your exposure meets this definition, then you are required to quarantine for 14 days per the CDC.    The 14 day quarantine begins from the day you were exposed, not the day of your test.  For example, if your exposure was on a Monday, and you waited until Friday of the same week to get tested and it was negative, your 14 day quarantine begins from that Monday, not the Friday you tested negative.    If you developed symptoms since the exposure, and your test was negative today, you still have to quarantine for 14 days from the date of the exposure.    So if you meet the definition of a close exposure, A NEGATIVE TEST DOES NOT GET YOU OUT OF 14 DAYS OF QUARANTINE!

## 2021-01-04 ENCOUNTER — PATIENT MESSAGE (OUTPATIENT)
Dept: INTERNAL MEDICINE | Facility: CLINIC | Age: 76
End: 2021-01-04

## 2021-01-06 ENCOUNTER — HOSPITAL ENCOUNTER (OUTPATIENT)
Dept: RADIOLOGY | Facility: HOSPITAL | Age: 76
Discharge: HOME | End: 2021-01-06
Attending: FAMILY MEDICINE
Payer: MEDICARE

## 2021-01-06 ENCOUNTER — OFFICE VISIT (OUTPATIENT)
Dept: INTERNAL MEDICINE | Facility: CLINIC | Age: 76
End: 2021-01-06
Payer: MEDICARE

## 2021-01-06 VITALS
WEIGHT: 134.06 LBS | TEMPERATURE: 98 F | HEART RATE: 65 BPM | BODY MASS INDEX: 21.04 KG/M2 | HEIGHT: 67 IN | RESPIRATION RATE: 18 BRPM | OXYGEN SATURATION: 98 % | SYSTOLIC BLOOD PRESSURE: 122 MMHG | DIASTOLIC BLOOD PRESSURE: 66 MMHG

## 2021-01-06 DIAGNOSIS — I50.22 CHRONIC SYSTOLIC CONGESTIVE HEART FAILURE: ICD-10-CM

## 2021-01-06 DIAGNOSIS — Z12.31 ENCOUNTER FOR SCREENING MAMMOGRAM FOR MALIGNANT NEOPLASM OF BREAST: ICD-10-CM

## 2021-01-06 DIAGNOSIS — M46.94 UNSPECIFIED INFLAMMATORY SPONDYLOPATHY, THORACIC REGION: ICD-10-CM

## 2021-01-06 DIAGNOSIS — I70.219 ATHEROSCLEROTIC PVD WITH INTERMITTENT CLAUDICATION: ICD-10-CM

## 2021-01-06 DIAGNOSIS — R92.8 ABNORMAL MAMMOGRAM OF LEFT BREAST: ICD-10-CM

## 2021-01-06 DIAGNOSIS — R60.9 SWELLING: ICD-10-CM

## 2021-01-06 DIAGNOSIS — F41.9 ANXIETY: Primary | ICD-10-CM

## 2021-01-06 PROCEDURE — 1159F MED LIST DOCD IN RCRD: CPT | Mod: S$GLB,,, | Performed by: FAMILY MEDICINE

## 2021-01-06 PROCEDURE — 1101F PT FALLS ASSESS-DOCD LE1/YR: CPT | Mod: CPTII,S$GLB,, | Performed by: FAMILY MEDICINE

## 2021-01-06 PROCEDURE — 1126F PR PAIN SEVERITY QUANTIFIED, NO PAIN PRESENT: ICD-10-PCS | Mod: S$GLB,,, | Performed by: FAMILY MEDICINE

## 2021-01-06 PROCEDURE — 3078F PR MOST RECENT DIASTOLIC BLOOD PRESSURE < 80 MM HG: ICD-10-PCS | Mod: CPTII,S$GLB,, | Performed by: FAMILY MEDICINE

## 2021-01-06 PROCEDURE — 3288F PR FALLS RISK ASSESSMENT DOCUMENTED: ICD-10-PCS | Mod: CPTII,S$GLB,, | Performed by: FAMILY MEDICINE

## 2021-01-06 PROCEDURE — 3074F PR MOST RECENT SYSTOLIC BLOOD PRESSURE < 130 MM HG: ICD-10-PCS | Mod: CPTII,S$GLB,, | Performed by: FAMILY MEDICINE

## 2021-01-06 PROCEDURE — 99499 UNLISTED E&M SERVICE: CPT | Mod: S$GLB,,, | Performed by: FAMILY MEDICINE

## 2021-01-06 PROCEDURE — 3008F PR BODY MASS INDEX (BMI) DOCUMENTED: ICD-10-PCS | Mod: CPTII,S$GLB,, | Performed by: FAMILY MEDICINE

## 2021-01-06 PROCEDURE — 1126F AMNT PAIN NOTED NONE PRSNT: CPT | Mod: S$GLB,,, | Performed by: FAMILY MEDICINE

## 2021-01-06 PROCEDURE — 1159F PR MEDICATION LIST DOCUMENTED IN MEDICAL RECORD: ICD-10-PCS | Mod: S$GLB,,, | Performed by: FAMILY MEDICINE

## 2021-01-06 PROCEDURE — 1101F PR PT FALLS ASSESS DOC 0-1 FALLS W/OUT INJ PAST YR: ICD-10-PCS | Mod: CPTII,S$GLB,, | Performed by: FAMILY MEDICINE

## 2021-01-06 PROCEDURE — 99214 PR OFFICE/OUTPT VISIT, EST, LEVL IV, 30-39 MIN: ICD-10-PCS | Mod: S$GLB,,, | Performed by: FAMILY MEDICINE

## 2021-01-06 PROCEDURE — 77063 MAMMO DIGITAL SCREENING BILAT WITH TOMO: ICD-10-PCS | Mod: 26,,, | Performed by: RADIOLOGY

## 2021-01-06 PROCEDURE — 3074F SYST BP LT 130 MM HG: CPT | Mod: CPTII,S$GLB,, | Performed by: FAMILY MEDICINE

## 2021-01-06 PROCEDURE — 99214 OFFICE O/P EST MOD 30 MIN: CPT | Mod: S$GLB,,, | Performed by: FAMILY MEDICINE

## 2021-01-06 PROCEDURE — 3078F DIAST BP <80 MM HG: CPT | Mod: CPTII,S$GLB,, | Performed by: FAMILY MEDICINE

## 2021-01-06 PROCEDURE — 77063 BREAST TOMOSYNTHESIS BI: CPT | Mod: 26,,, | Performed by: RADIOLOGY

## 2021-01-06 PROCEDURE — 3008F BODY MASS INDEX DOCD: CPT | Mod: CPTII,S$GLB,, | Performed by: FAMILY MEDICINE

## 2021-01-06 PROCEDURE — 77067 MAMMO DIGITAL SCREENING BILAT WITH TOMO: ICD-10-PCS | Mod: 26,,, | Performed by: RADIOLOGY

## 2021-01-06 PROCEDURE — 99999 PR PBB SHADOW E&M-EST. PATIENT-LVL III: CPT | Mod: PBBFAC,,, | Performed by: FAMILY MEDICINE

## 2021-01-06 PROCEDURE — 77067 SCR MAMMO BI INCL CAD: CPT | Mod: TC

## 2021-01-06 PROCEDURE — 77067 SCR MAMMO BI INCL CAD: CPT | Mod: 26,,, | Performed by: RADIOLOGY

## 2021-01-06 PROCEDURE — 99999 PR PBB SHADOW E&M-EST. PATIENT-LVL III: ICD-10-PCS | Mod: PBBFAC,,, | Performed by: FAMILY MEDICINE

## 2021-01-06 PROCEDURE — 3288F FALL RISK ASSESSMENT DOCD: CPT | Mod: CPTII,S$GLB,, | Performed by: FAMILY MEDICINE

## 2021-01-06 PROCEDURE — 99499 RISK ADDL DX/OHS AUDIT: ICD-10-PCS | Mod: S$GLB,,, | Performed by: FAMILY MEDICINE

## 2021-01-06 RX ORDER — DULOXETIN HYDROCHLORIDE 20 MG/1
20 CAPSULE, DELAYED RELEASE ORAL DAILY
Qty: 30 CAPSULE | Refills: 0 | Status: SHIPPED | OUTPATIENT
Start: 2021-01-06 | End: 2021-01-19

## 2021-01-06 RX ORDER — ALPRAZOLAM 0.5 MG/1
0.5 TABLET ORAL 3 TIMES DAILY
Qty: 90 TABLET | Refills: 0 | Status: SHIPPED | OUTPATIENT
Start: 2021-01-06 | End: 2021-02-26 | Stop reason: SDUPTHER

## 2021-01-06 RX ORDER — HYDROCHLOROTHIAZIDE 12.5 MG/1
12.5 TABLET ORAL DAILY
Qty: 90 TABLET | Refills: 1 | Status: SHIPPED | OUTPATIENT
Start: 2021-01-06 | End: 2021-06-23

## 2021-01-11 ENCOUNTER — TELEPHONE (OUTPATIENT)
Dept: CARDIOLOGY | Facility: CLINIC | Age: 76
End: 2021-01-11

## 2021-01-12 ENCOUNTER — PATIENT MESSAGE (OUTPATIENT)
Dept: INTERNAL MEDICINE | Facility: CLINIC | Age: 76
End: 2021-01-12

## 2021-01-12 RX ORDER — ESZOPICLONE 1 MG/1
1 TABLET, FILM COATED ORAL NIGHTLY
Qty: 30 TABLET | Refills: 0 | Status: SHIPPED | OUTPATIENT
Start: 2021-01-12 | End: 2021-02-11

## 2021-01-14 ENCOUNTER — PATIENT MESSAGE (OUTPATIENT)
Dept: INTERNAL MEDICINE | Facility: CLINIC | Age: 76
End: 2021-01-14

## 2021-01-19 ENCOUNTER — PATIENT MESSAGE (OUTPATIENT)
Dept: TRANSPLANT | Facility: CLINIC | Age: 76
End: 2021-01-19

## 2021-01-19 ENCOUNTER — PATIENT MESSAGE (OUTPATIENT)
Dept: INTERNAL MEDICINE | Facility: CLINIC | Age: 76
End: 2021-01-19

## 2021-01-19 RX ORDER — SERTRALINE HYDROCHLORIDE 50 MG/1
50 TABLET, FILM COATED ORAL DAILY
Qty: 90 TABLET | Refills: 0 | Status: SHIPPED | OUTPATIENT
Start: 2021-01-19 | End: 2021-03-01 | Stop reason: SDUPTHER

## 2021-02-17 ENCOUNTER — PATIENT MESSAGE (OUTPATIENT)
Dept: INTERNAL MEDICINE | Facility: CLINIC | Age: 76
End: 2021-02-17

## 2021-02-17 DIAGNOSIS — E78.1 HYPERTRIGLYCERIDEMIA: ICD-10-CM

## 2021-02-17 RX ORDER — FENOFIBRATE 54 MG/1
54 TABLET ORAL DAILY
Qty: 90 TABLET | Refills: 1 | Status: SHIPPED | OUTPATIENT
Start: 2021-02-17 | End: 2021-09-14 | Stop reason: SDUPTHER

## 2021-02-18 ENCOUNTER — PATIENT MESSAGE (OUTPATIENT)
Dept: INTERNAL MEDICINE | Facility: CLINIC | Age: 76
End: 2021-02-18

## 2021-02-23 ENCOUNTER — PATIENT MESSAGE (OUTPATIENT)
Dept: INTERNAL MEDICINE | Facility: CLINIC | Age: 76
End: 2021-02-23

## 2021-02-23 DIAGNOSIS — R00.2 HEART PALPITATIONS: ICD-10-CM

## 2021-02-23 RX ORDER — METOPROLOL SUCCINATE 25 MG/1
25 TABLET, EXTENDED RELEASE ORAL DAILY
Qty: 90 TABLET | Refills: 1 | Status: SHIPPED | OUTPATIENT
Start: 2021-02-23 | End: 2021-09-14 | Stop reason: SDUPTHER

## 2021-02-26 ENCOUNTER — PATIENT MESSAGE (OUTPATIENT)
Dept: INTERNAL MEDICINE | Facility: CLINIC | Age: 76
End: 2021-02-26

## 2021-02-26 DIAGNOSIS — F41.9 ANXIETY: ICD-10-CM

## 2021-02-26 RX ORDER — ALPRAZOLAM 0.5 MG/1
0.5 TABLET ORAL 3 TIMES DAILY
Qty: 90 TABLET | Refills: 0 | Status: SHIPPED | OUTPATIENT
Start: 2021-02-26 | End: 2021-03-25 | Stop reason: SDUPTHER

## 2021-02-26 RX ORDER — ALPRAZOLAM 0.5 MG/1
0.5 TABLET ORAL 3 TIMES DAILY
Qty: 90 TABLET | Refills: 0 | Status: CANCELLED | OUTPATIENT
Start: 2021-02-26 | End: 2021-03-28

## 2021-03-01 ENCOUNTER — TELEPHONE (OUTPATIENT)
Dept: INTERNAL MEDICINE | Facility: CLINIC | Age: 76
End: 2021-03-01

## 2021-03-01 RX ORDER — SERTRALINE HYDROCHLORIDE 100 MG/1
100 TABLET, FILM COATED ORAL DAILY
Qty: 90 TABLET | Refills: 0 | Status: SHIPPED | OUTPATIENT
Start: 2021-03-01 | End: 2021-04-22 | Stop reason: SDUPTHER

## 2021-03-09 RX ORDER — MONTELUKAST SODIUM 10 MG/1
TABLET ORAL
Qty: 90 TABLET | Refills: 3 | Status: SHIPPED | OUTPATIENT
Start: 2021-03-09 | End: 2021-07-20 | Stop reason: SDUPTHER

## 2021-03-15 ENCOUNTER — PATIENT MESSAGE (OUTPATIENT)
Dept: INTERNAL MEDICINE | Facility: CLINIC | Age: 76
End: 2021-03-15

## 2021-03-15 DIAGNOSIS — M16.11 ARTHRITIS OF RIGHT HIP: ICD-10-CM

## 2021-03-15 DIAGNOSIS — M16.12 ARTHRITIS OF LEFT HIP: ICD-10-CM

## 2021-03-15 DIAGNOSIS — M47.817 LUMBOSACRAL SPONDYLOSIS WITHOUT MYELOPATHY: ICD-10-CM

## 2021-03-15 RX ORDER — MELOXICAM 15 MG/1
15 TABLET ORAL DAILY
Qty: 90 TABLET | Refills: 0 | Status: CANCELLED | OUTPATIENT
Start: 2021-03-15

## 2021-03-15 RX ORDER — MELOXICAM 15 MG/1
15 TABLET ORAL DAILY
Qty: 90 TABLET | Refills: 0 | Status: SHIPPED | OUTPATIENT
Start: 2021-03-15 | End: 2021-06-16 | Stop reason: SDUPTHER

## 2021-03-22 DIAGNOSIS — F41.9 ANXIETY: ICD-10-CM

## 2021-03-22 DIAGNOSIS — E78.5 DYSLIPIDEMIA: Primary | ICD-10-CM

## 2021-03-24 RX ORDER — DULOXETIN HYDROCHLORIDE 20 MG/1
CAPSULE, DELAYED RELEASE ORAL
Qty: 90 CAPSULE | Refills: 0 | Status: SHIPPED | OUTPATIENT
Start: 2021-03-24 | End: 2021-06-08

## 2021-03-25 ENCOUNTER — PATIENT MESSAGE (OUTPATIENT)
Dept: INTERNAL MEDICINE | Facility: CLINIC | Age: 76
End: 2021-03-25

## 2021-03-25 DIAGNOSIS — F41.9 ANXIETY: ICD-10-CM

## 2021-03-25 RX ORDER — ALPRAZOLAM 0.5 MG/1
0.5 TABLET ORAL 3 TIMES DAILY
Qty: 90 TABLET | Refills: 0 | Status: SHIPPED | OUTPATIENT
Start: 2021-03-25 | End: 2021-04-26 | Stop reason: SDUPTHER

## 2021-03-26 ENCOUNTER — PATIENT MESSAGE (OUTPATIENT)
Dept: INTERNAL MEDICINE | Facility: CLINIC | Age: 76
End: 2021-03-26

## 2021-04-09 DIAGNOSIS — I73.9 PAD (PERIPHERAL ARTERY DISEASE): ICD-10-CM

## 2021-04-09 DIAGNOSIS — I75.021 BLUE TOE SYNDROME OF RIGHT LOWER EXTREMITY: ICD-10-CM

## 2021-04-12 RX ORDER — CILOSTAZOL 50 MG/1
TABLET ORAL
Qty: 60 TABLET | Refills: 0 | Status: SHIPPED | OUTPATIENT
Start: 2021-04-12 | End: 2021-04-14

## 2021-04-13 DIAGNOSIS — I73.9 PAD (PERIPHERAL ARTERY DISEASE): ICD-10-CM

## 2021-04-13 DIAGNOSIS — I75.021 BLUE TOE SYNDROME OF RIGHT LOWER EXTREMITY: ICD-10-CM

## 2021-04-14 RX ORDER — CILOSTAZOL 50 MG/1
TABLET ORAL
Qty: 60 TABLET | Refills: 0 | Status: SHIPPED | OUTPATIENT
Start: 2021-04-14 | End: 2021-06-08

## 2021-04-22 ENCOUNTER — PATIENT MESSAGE (OUTPATIENT)
Dept: INTERNAL MEDICINE | Facility: CLINIC | Age: 76
End: 2021-04-22

## 2021-04-22 RX ORDER — SERTRALINE HYDROCHLORIDE 100 MG/1
200 TABLET, FILM COATED ORAL DAILY
Qty: 180 TABLET | Refills: 1 | Status: SHIPPED | OUTPATIENT
Start: 2021-04-22 | End: 2021-09-14 | Stop reason: SDUPTHER

## 2021-04-24 ENCOUNTER — PATIENT MESSAGE (OUTPATIENT)
Dept: INTERNAL MEDICINE | Facility: CLINIC | Age: 76
End: 2021-04-24

## 2021-04-24 DIAGNOSIS — F41.9 ANXIETY: ICD-10-CM

## 2021-04-26 RX ORDER — ALPRAZOLAM 0.5 MG/1
0.5 TABLET ORAL 3 TIMES DAILY
Qty: 90 TABLET | Refills: 0 | Status: SHIPPED | OUTPATIENT
Start: 2021-04-26 | End: 2021-05-27 | Stop reason: SDUPTHER

## 2021-05-10 ENCOUNTER — PATIENT MESSAGE (OUTPATIENT)
Dept: INTERNAL MEDICINE | Facility: CLINIC | Age: 76
End: 2021-05-10

## 2021-05-24 DIAGNOSIS — K21.9 GASTROESOPHAGEAL REFLUX DISEASE WITHOUT ESOPHAGITIS: ICD-10-CM

## 2021-05-24 DIAGNOSIS — E78.5 DYSLIPIDEMIA: Primary | ICD-10-CM

## 2021-05-27 DIAGNOSIS — F41.9 ANXIETY: ICD-10-CM

## 2021-05-27 RX ORDER — ALPRAZOLAM 0.5 MG/1
0.5 TABLET ORAL 3 TIMES DAILY
Qty: 90 TABLET | Refills: 0 | Status: SHIPPED | OUTPATIENT
Start: 2021-05-27 | End: 2021-06-16 | Stop reason: SDUPTHER

## 2021-05-28 RX ORDER — PANTOPRAZOLE SODIUM 40 MG/1
TABLET, DELAYED RELEASE ORAL
Qty: 90 TABLET | Refills: 2 | Status: SHIPPED | OUTPATIENT
Start: 2021-05-28 | End: 2022-01-20

## 2021-06-11 ENCOUNTER — TELEPHONE (OUTPATIENT)
Dept: INTERNAL MEDICINE | Facility: CLINIC | Age: 76
End: 2021-06-11

## 2021-06-16 ENCOUNTER — OFFICE VISIT (OUTPATIENT)
Dept: INTERNAL MEDICINE | Facility: CLINIC | Age: 76
End: 2021-06-16
Payer: MEDICARE

## 2021-06-16 VITALS
HEIGHT: 67 IN | HEART RATE: 63 BPM | TEMPERATURE: 97 F | SYSTOLIC BLOOD PRESSURE: 110 MMHG | OXYGEN SATURATION: 98 % | BODY MASS INDEX: 20.28 KG/M2 | DIASTOLIC BLOOD PRESSURE: 58 MMHG | WEIGHT: 129.19 LBS

## 2021-06-16 DIAGNOSIS — M16.12 ARTHRITIS OF LEFT HIP: ICD-10-CM

## 2021-06-16 DIAGNOSIS — M16.11 ARTHRITIS OF RIGHT HIP: ICD-10-CM

## 2021-06-16 DIAGNOSIS — F41.9 ANXIETY: ICD-10-CM

## 2021-06-16 DIAGNOSIS — M47.817 LUMBOSACRAL SPONDYLOSIS WITHOUT MYELOPATHY: ICD-10-CM

## 2021-06-16 DIAGNOSIS — R39.9 UTI SYMPTOMS: Primary | ICD-10-CM

## 2021-06-16 PROCEDURE — 1159F MED LIST DOCD IN RCRD: CPT | Mod: S$GLB,,, | Performed by: FAMILY MEDICINE

## 2021-06-16 PROCEDURE — 1126F AMNT PAIN NOTED NONE PRSNT: CPT | Mod: S$GLB,,, | Performed by: FAMILY MEDICINE

## 2021-06-16 PROCEDURE — 1101F PT FALLS ASSESS-DOCD LE1/YR: CPT | Mod: CPTII,S$GLB,, | Performed by: FAMILY MEDICINE

## 2021-06-16 PROCEDURE — 3288F FALL RISK ASSESSMENT DOCD: CPT | Mod: CPTII,S$GLB,, | Performed by: FAMILY MEDICINE

## 2021-06-16 PROCEDURE — 99214 OFFICE O/P EST MOD 30 MIN: CPT | Mod: S$GLB,,, | Performed by: FAMILY MEDICINE

## 2021-06-16 PROCEDURE — 3288F PR FALLS RISK ASSESSMENT DOCUMENTED: ICD-10-PCS | Mod: CPTII,S$GLB,, | Performed by: FAMILY MEDICINE

## 2021-06-16 PROCEDURE — 1126F PR PAIN SEVERITY QUANTIFIED, NO PAIN PRESENT: ICD-10-PCS | Mod: S$GLB,,, | Performed by: FAMILY MEDICINE

## 2021-06-16 PROCEDURE — 1159F PR MEDICATION LIST DOCUMENTED IN MEDICAL RECORD: ICD-10-PCS | Mod: S$GLB,,, | Performed by: FAMILY MEDICINE

## 2021-06-16 PROCEDURE — 99999 PR PBB SHADOW E&M-EST. PATIENT-LVL III: ICD-10-PCS | Mod: PBBFAC,,, | Performed by: FAMILY MEDICINE

## 2021-06-16 PROCEDURE — 1101F PR PT FALLS ASSESS DOC 0-1 FALLS W/OUT INJ PAST YR: ICD-10-PCS | Mod: CPTII,S$GLB,, | Performed by: FAMILY MEDICINE

## 2021-06-16 PROCEDURE — 99999 PR PBB SHADOW E&M-EST. PATIENT-LVL III: CPT | Mod: PBBFAC,,, | Performed by: FAMILY MEDICINE

## 2021-06-16 PROCEDURE — 99214 PR OFFICE/OUTPT VISIT, EST, LEVL IV, 30-39 MIN: ICD-10-PCS | Mod: S$GLB,,, | Performed by: FAMILY MEDICINE

## 2021-06-16 RX ORDER — ALPRAZOLAM 0.5 MG/1
0.5 TABLET ORAL 3 TIMES DAILY
Qty: 90 TABLET | Refills: 0 | Status: SHIPPED | OUTPATIENT
Start: 2021-06-16 | End: 2021-07-16 | Stop reason: SDUPTHER

## 2021-06-16 RX ORDER — NITROFURANTOIN 25; 75 MG/1; MG/1
100 CAPSULE ORAL 2 TIMES DAILY
Qty: 20 CAPSULE | Refills: 0 | Status: SHIPPED | OUTPATIENT
Start: 2021-06-16 | End: 2021-08-27

## 2021-06-16 RX ORDER — MELOXICAM 15 MG/1
15 TABLET ORAL DAILY
Qty: 90 TABLET | Refills: 0 | Status: SHIPPED | OUTPATIENT
Start: 2021-06-16 | End: 2021-07-20 | Stop reason: SDUPTHER

## 2021-06-17 ENCOUNTER — TELEPHONE (OUTPATIENT)
Dept: INTERNAL MEDICINE | Facility: CLINIC | Age: 76
End: 2021-06-17

## 2021-06-17 ENCOUNTER — PATIENT MESSAGE (OUTPATIENT)
Dept: INTERNAL MEDICINE | Facility: CLINIC | Age: 76
End: 2021-06-17

## 2021-06-17 ENCOUNTER — LAB VISIT (OUTPATIENT)
Dept: LAB | Facility: HOSPITAL | Age: 76
End: 2021-06-17
Attending: FAMILY MEDICINE
Payer: MEDICARE

## 2021-06-17 DIAGNOSIS — R39.9 UTI SYMPTOMS: Primary | ICD-10-CM

## 2021-06-17 DIAGNOSIS — R39.9 UTI SYMPTOMS: ICD-10-CM

## 2021-06-17 LAB
BILIRUB UR QL STRIP: NEGATIVE
CLARITY UR REFRACT.AUTO: CLEAR
COLOR UR AUTO: YELLOW
GLUCOSE UR QL STRIP: NEGATIVE
HGB UR QL STRIP: NEGATIVE
KETONES UR QL STRIP: NEGATIVE
LEUKOCYTE ESTERASE UR QL STRIP: NEGATIVE
NITRITE UR QL STRIP: NEGATIVE
PH UR STRIP: 7 [PH] (ref 5–8)
PROT UR QL STRIP: NEGATIVE
SP GR UR STRIP: 1.01 (ref 1–1.03)
URN SPEC COLLECT METH UR: NORMAL

## 2021-06-17 PROCEDURE — 81003 URINALYSIS AUTO W/O SCOPE: CPT | Performed by: FAMILY MEDICINE

## 2021-06-17 PROCEDURE — 87086 URINE CULTURE/COLONY COUNT: CPT | Performed by: FAMILY MEDICINE

## 2021-06-18 LAB
BACTERIA UR CULT: NORMAL
BACTERIA UR CULT: NORMAL

## 2021-06-23 DIAGNOSIS — R60.9 SWELLING: ICD-10-CM

## 2021-06-23 RX ORDER — HYDROCHLOROTHIAZIDE 12.5 MG/1
CAPSULE ORAL
Qty: 90 CAPSULE | Refills: 3 | Status: SHIPPED | OUTPATIENT
Start: 2021-06-23 | End: 2022-03-31 | Stop reason: SDUPTHER

## 2021-07-10 DIAGNOSIS — I73.9 PAD (PERIPHERAL ARTERY DISEASE): ICD-10-CM

## 2021-07-10 DIAGNOSIS — I75.021 BLUE TOE SYNDROME OF RIGHT LOWER EXTREMITY: ICD-10-CM

## 2021-07-12 RX ORDER — CILOSTAZOL 50 MG/1
TABLET ORAL
Qty: 60 TABLET | Refills: 0 | Status: SHIPPED | OUTPATIENT
Start: 2021-07-12 | End: 2021-08-17

## 2021-07-16 DIAGNOSIS — F41.9 ANXIETY: ICD-10-CM

## 2021-07-16 RX ORDER — ALPRAZOLAM 0.5 MG/1
0.5 TABLET ORAL 3 TIMES DAILY
Qty: 90 TABLET | Refills: 0 | Status: SHIPPED | OUTPATIENT
Start: 2021-07-16 | End: 2021-07-20 | Stop reason: SDUPTHER

## 2021-08-17 DIAGNOSIS — I73.9 PAD (PERIPHERAL ARTERY DISEASE): ICD-10-CM

## 2021-08-17 DIAGNOSIS — I75.021 BLUE TOE SYNDROME OF RIGHT LOWER EXTREMITY: ICD-10-CM

## 2021-08-17 RX ORDER — CILOSTAZOL 50 MG/1
TABLET ORAL
Qty: 60 TABLET | Refills: 0 | Status: SHIPPED | OUTPATIENT
Start: 2021-08-17 | End: 2021-09-20

## 2021-08-23 ENCOUNTER — PATIENT MESSAGE (OUTPATIENT)
Dept: INTERNAL MEDICINE | Facility: CLINIC | Age: 76
End: 2021-08-23

## 2021-08-23 DIAGNOSIS — F41.9 ANXIETY: ICD-10-CM

## 2021-08-23 RX ORDER — ALPRAZOLAM 0.5 MG/1
0.5 TABLET ORAL 3 TIMES DAILY
Qty: 90 TABLET | Refills: 0 | Status: SHIPPED | OUTPATIENT
Start: 2021-08-23 | End: 2021-09-20 | Stop reason: SDUPTHER

## 2021-08-27 ENCOUNTER — LAB VISIT (OUTPATIENT)
Dept: LAB | Facility: HOSPITAL | Age: 76
End: 2021-08-27
Attending: FAMILY MEDICINE
Payer: MEDICARE

## 2021-08-27 ENCOUNTER — OFFICE VISIT (OUTPATIENT)
Dept: INTERNAL MEDICINE | Facility: CLINIC | Age: 76
End: 2021-08-27
Payer: MEDICARE

## 2021-08-27 VITALS
HEART RATE: 93 BPM | RESPIRATION RATE: 18 BRPM | BODY MASS INDEX: 21.51 KG/M2 | HEIGHT: 66 IN | WEIGHT: 133.81 LBS | DIASTOLIC BLOOD PRESSURE: 62 MMHG | SYSTOLIC BLOOD PRESSURE: 100 MMHG | TEMPERATURE: 99 F | OXYGEN SATURATION: 95 %

## 2021-08-27 DIAGNOSIS — E87.5 HYPERKALEMIA: ICD-10-CM

## 2021-08-27 DIAGNOSIS — D64.9 ANEMIA, UNSPECIFIED TYPE: ICD-10-CM

## 2021-08-27 DIAGNOSIS — L29.9 ITCHING: ICD-10-CM

## 2021-08-27 DIAGNOSIS — Z00.00 ROUTINE PHYSICAL EXAMINATION: Primary | ICD-10-CM

## 2021-08-27 LAB
ANION GAP SERPL CALC-SCNC: 9 MMOL/L (ref 8–16)
BASOPHILS # BLD AUTO: 0.06 K/UL (ref 0–0.2)
BASOPHILS NFR BLD: 0.6 % (ref 0–1.9)
BUN SERPL-MCNC: 15 MG/DL (ref 8–23)
CALCIUM SERPL-MCNC: 10 MG/DL (ref 8.7–10.5)
CHLORIDE SERPL-SCNC: 93 MMOL/L (ref 95–110)
CO2 SERPL-SCNC: 25 MMOL/L (ref 23–29)
CREAT SERPL-MCNC: 1.1 MG/DL (ref 0.5–1.4)
DIFFERENTIAL METHOD: ABNORMAL
EOSINOPHIL # BLD AUTO: 0.6 K/UL (ref 0–0.5)
EOSINOPHIL NFR BLD: 6.2 % (ref 0–8)
ERYTHROCYTE [DISTWIDTH] IN BLOOD BY AUTOMATED COUNT: 12.9 % (ref 11.5–14.5)
EST. GFR  (AFRICAN AMERICAN): 56.8 ML/MIN/1.73 M^2
EST. GFR  (NON AFRICAN AMERICAN): 49.2 ML/MIN/1.73 M^2
GLUCOSE SERPL-MCNC: 97 MG/DL (ref 70–110)
HCT VFR BLD AUTO: 34 % (ref 37–48.5)
HGB BLD-MCNC: 11.1 G/DL (ref 12–16)
IMM GRANULOCYTES # BLD AUTO: 0.15 K/UL (ref 0–0.04)
IMM GRANULOCYTES NFR BLD AUTO: 1.6 % (ref 0–0.5)
LYMPHOCYTES # BLD AUTO: 1.3 K/UL (ref 1–4.8)
LYMPHOCYTES NFR BLD: 14.3 % (ref 18–48)
MCH RBC QN AUTO: 31 PG (ref 27–31)
MCHC RBC AUTO-ENTMCNC: 32.6 G/DL (ref 32–36)
MCV RBC AUTO: 95 FL (ref 82–98)
MONOCYTES # BLD AUTO: 0.7 K/UL (ref 0.3–1)
MONOCYTES NFR BLD: 7.5 % (ref 4–15)
NEUTROPHILS # BLD AUTO: 6.6 K/UL (ref 1.8–7.7)
NEUTROPHILS NFR BLD: 69.8 % (ref 38–73)
NRBC BLD-RTO: 0 /100 WBC
PLATELET # BLD AUTO: 338 K/UL (ref 150–450)
PMV BLD AUTO: 10.2 FL (ref 9.2–12.9)
POTASSIUM SERPL-SCNC: 4.8 MMOL/L (ref 3.5–5.1)
RBC # BLD AUTO: 3.58 M/UL (ref 4–5.4)
SODIUM SERPL-SCNC: 127 MMOL/L (ref 136–145)
WBC # BLD AUTO: 9.38 K/UL (ref 3.9–12.7)

## 2021-08-27 PROCEDURE — 99213 PR OFFICE/OUTPT VISIT, EST, LEVL III, 20-29 MIN: ICD-10-PCS | Mod: S$GLB,,, | Performed by: FAMILY MEDICINE

## 2021-08-27 PROCEDURE — 3074F SYST BP LT 130 MM HG: CPT | Mod: CPTII,S$GLB,, | Performed by: FAMILY MEDICINE

## 2021-08-27 PROCEDURE — 85025 COMPLETE CBC W/AUTO DIFF WBC: CPT | Performed by: FAMILY MEDICINE

## 2021-08-27 PROCEDURE — 3288F PR FALLS RISK ASSESSMENT DOCUMENTED: ICD-10-PCS | Mod: CPTII,S$GLB,, | Performed by: FAMILY MEDICINE

## 2021-08-27 PROCEDURE — 3288F FALL RISK ASSESSMENT DOCD: CPT | Mod: CPTII,S$GLB,, | Performed by: FAMILY MEDICINE

## 2021-08-27 PROCEDURE — 1126F PR PAIN SEVERITY QUANTIFIED, NO PAIN PRESENT: ICD-10-PCS | Mod: CPTII,S$GLB,, | Performed by: FAMILY MEDICINE

## 2021-08-27 PROCEDURE — 1101F PR PT FALLS ASSESS DOC 0-1 FALLS W/OUT INJ PAST YR: ICD-10-PCS | Mod: CPTII,S$GLB,, | Performed by: FAMILY MEDICINE

## 2021-08-27 PROCEDURE — 36415 COLL VENOUS BLD VENIPUNCTURE: CPT | Performed by: FAMILY MEDICINE

## 2021-08-27 PROCEDURE — 3074F PR MOST RECENT SYSTOLIC BLOOD PRESSURE < 130 MM HG: ICD-10-PCS | Mod: CPTII,S$GLB,, | Performed by: FAMILY MEDICINE

## 2021-08-27 PROCEDURE — 99213 OFFICE O/P EST LOW 20 MIN: CPT | Mod: S$GLB,,, | Performed by: FAMILY MEDICINE

## 2021-08-27 PROCEDURE — 99999 PR PBB SHADOW E&M-EST. PATIENT-LVL IV: CPT | Mod: PBBFAC,,, | Performed by: FAMILY MEDICINE

## 2021-08-27 PROCEDURE — 80048 BASIC METABOLIC PNL TOTAL CA: CPT | Performed by: FAMILY MEDICINE

## 2021-08-27 PROCEDURE — 3078F DIAST BP <80 MM HG: CPT | Mod: CPTII,S$GLB,, | Performed by: FAMILY MEDICINE

## 2021-08-27 PROCEDURE — 3078F PR MOST RECENT DIASTOLIC BLOOD PRESSURE < 80 MM HG: ICD-10-PCS | Mod: CPTII,S$GLB,, | Performed by: FAMILY MEDICINE

## 2021-08-27 PROCEDURE — 99999 PR PBB SHADOW E&M-EST. PATIENT-LVL IV: ICD-10-PCS | Mod: PBBFAC,,, | Performed by: FAMILY MEDICINE

## 2021-08-27 PROCEDURE — 1126F AMNT PAIN NOTED NONE PRSNT: CPT | Mod: CPTII,S$GLB,, | Performed by: FAMILY MEDICINE

## 2021-08-27 PROCEDURE — 1101F PT FALLS ASSESS-DOCD LE1/YR: CPT | Mod: CPTII,S$GLB,, | Performed by: FAMILY MEDICINE

## 2021-08-27 RX ORDER — HYDROXYZINE HYDROCHLORIDE 50 MG/1
50 TABLET, FILM COATED ORAL 3 TIMES DAILY PRN
Qty: 40 TABLET | Refills: 1 | Status: SHIPPED | OUTPATIENT
Start: 2021-08-27 | End: 2021-12-15 | Stop reason: SDUPTHER

## 2021-09-05 ENCOUNTER — PATIENT MESSAGE (OUTPATIENT)
Dept: INTERNAL MEDICINE | Facility: CLINIC | Age: 76
End: 2021-09-05

## 2021-09-07 ENCOUNTER — PATIENT MESSAGE (OUTPATIENT)
Dept: INTERNAL MEDICINE | Facility: CLINIC | Age: 76
End: 2021-09-07

## 2021-09-07 RX ORDER — DOXEPIN HYDROCHLORIDE 10 MG/1
10 CAPSULE ORAL NIGHTLY
Qty: 14 CAPSULE | Refills: 0 | Status: SHIPPED | OUTPATIENT
Start: 2021-09-07 | End: 2021-09-14 | Stop reason: SDUPTHER

## 2021-09-09 ENCOUNTER — TELEPHONE (OUTPATIENT)
Dept: INTERNAL MEDICINE | Facility: CLINIC | Age: 76
End: 2021-09-09

## 2021-09-14 DIAGNOSIS — M16.12 ARTHRITIS OF LEFT HIP: ICD-10-CM

## 2021-09-14 DIAGNOSIS — F41.9 ANXIETY: ICD-10-CM

## 2021-09-14 DIAGNOSIS — M47.817 LUMBOSACRAL SPONDYLOSIS WITHOUT MYELOPATHY: ICD-10-CM

## 2021-09-14 DIAGNOSIS — E78.1 HYPERTRIGLYCERIDEMIA: ICD-10-CM

## 2021-09-14 DIAGNOSIS — M16.11 ARTHRITIS OF RIGHT HIP: ICD-10-CM

## 2021-09-14 DIAGNOSIS — R00.2 HEART PALPITATIONS: ICD-10-CM

## 2021-09-14 RX ORDER — DOXEPIN HYDROCHLORIDE 10 MG/1
10 CAPSULE ORAL NIGHTLY
Qty: 14 CAPSULE | Refills: 0 | Status: SHIPPED | OUTPATIENT
Start: 2021-09-14 | End: 2022-04-12

## 2021-09-14 RX ORDER — FENOFIBRATE 54 MG/1
TABLET ORAL
Qty: 90 TABLET | Refills: 0 | Status: SHIPPED | OUTPATIENT
Start: 2021-09-14 | End: 2021-11-23

## 2021-09-14 RX ORDER — METOPROLOL SUCCINATE 25 MG/1
TABLET, EXTENDED RELEASE ORAL
Qty: 90 TABLET | Refills: 0 | Status: SHIPPED | OUTPATIENT
Start: 2021-09-14 | End: 2021-12-09

## 2021-09-14 RX ORDER — SERTRALINE HYDROCHLORIDE 100 MG/1
200 TABLET, FILM COATED ORAL DAILY
Qty: 180 TABLET | Refills: 1 | Status: SHIPPED | OUTPATIENT
Start: 2021-09-14 | End: 2022-04-01

## 2021-09-14 RX ORDER — PRAVASTATIN SODIUM 40 MG/1
TABLET ORAL
Qty: 90 TABLET | Refills: 0 | Status: SHIPPED | OUTPATIENT
Start: 2021-09-14 | End: 2021-12-09

## 2021-09-14 RX ORDER — MELOXICAM 15 MG/1
15 TABLET ORAL DAILY
Qty: 90 TABLET | Refills: 0 | Status: SHIPPED | OUTPATIENT
Start: 2021-09-14 | End: 2022-02-22

## 2021-09-14 RX ORDER — DULOXETIN HYDROCHLORIDE 20 MG/1
CAPSULE, DELAYED RELEASE ORAL
Qty: 90 CAPSULE | Refills: 0 | Status: SHIPPED | OUTPATIENT
Start: 2021-09-14 | End: 2021-12-09

## 2021-09-17 ENCOUNTER — TELEPHONE (OUTPATIENT)
Dept: INTERNAL MEDICINE | Facility: CLINIC | Age: 76
End: 2021-09-17

## 2021-09-20 DIAGNOSIS — I73.9 PAD (PERIPHERAL ARTERY DISEASE): ICD-10-CM

## 2021-09-20 DIAGNOSIS — I75.021 BLUE TOE SYNDROME OF RIGHT LOWER EXTREMITY: ICD-10-CM

## 2021-09-20 DIAGNOSIS — F41.9 ANXIETY: ICD-10-CM

## 2021-09-20 RX ORDER — ALPRAZOLAM 0.5 MG/1
0.5 TABLET ORAL 3 TIMES DAILY
Qty: 90 TABLET | Refills: 0 | Status: SHIPPED | OUTPATIENT
Start: 2021-09-20 | End: 2021-10-14 | Stop reason: SDUPTHER

## 2021-09-20 RX ORDER — CILOSTAZOL 50 MG/1
TABLET ORAL
Qty: 60 TABLET | Refills: 0 | Status: SHIPPED | OUTPATIENT
Start: 2021-09-20 | End: 2021-10-09

## 2021-09-23 ENCOUNTER — TELEPHONE (OUTPATIENT)
Dept: INTERNAL MEDICINE | Facility: CLINIC | Age: 76
End: 2021-09-23

## 2021-09-23 DIAGNOSIS — H92.13 OTORRHEA OF BOTH EARS: ICD-10-CM

## 2021-09-23 DIAGNOSIS — H92.03 EAR PAIN, BILATERAL: Primary | ICD-10-CM

## 2021-10-04 ENCOUNTER — CLINICAL SUPPORT (OUTPATIENT)
Dept: AUDIOLOGY | Facility: CLINIC | Age: 76
End: 2021-10-04
Payer: MEDICARE

## 2021-10-04 ENCOUNTER — OFFICE VISIT (OUTPATIENT)
Dept: OTOLARYNGOLOGY | Facility: CLINIC | Age: 76
End: 2021-10-04
Payer: MEDICARE

## 2021-10-04 VITALS — WEIGHT: 140.63 LBS | TEMPERATURE: 98 F | BODY MASS INDEX: 22.6 KG/M2 | HEIGHT: 66 IN

## 2021-10-04 DIAGNOSIS — H72.91 PERFORATED EARDRUM, RIGHT: ICD-10-CM

## 2021-10-04 DIAGNOSIS — H90.A31 MIXED CONDUCTIVE AND SENSORINEURAL HEARING LOSS OF RIGHT EAR WITH RESTRICTED HEARING OF LEFT EAR: Primary | ICD-10-CM

## 2021-10-04 DIAGNOSIS — H92.13 OTORRHEA OF BOTH EARS: ICD-10-CM

## 2021-10-04 DIAGNOSIS — H72.91 PERFORATED EAR DRUM, RIGHT: ICD-10-CM

## 2021-10-04 DIAGNOSIS — H91.90 PERCEIVED HEARING CHANGES: Primary | ICD-10-CM

## 2021-10-04 PROCEDURE — 99213 PR OFFICE/OUTPT VISIT, EST, LEVL III, 20-29 MIN: ICD-10-PCS | Mod: S$GLB,,, | Performed by: PHYSICIAN ASSISTANT

## 2021-10-04 PROCEDURE — 1126F AMNT PAIN NOTED NONE PRSNT: CPT | Mod: CPTII,S$GLB,, | Performed by: PHYSICIAN ASSISTANT

## 2021-10-04 PROCEDURE — 99999 PR PBB SHADOW E&M-EST. PATIENT-LVL IV: ICD-10-PCS | Mod: PBBFAC,,, | Performed by: PHYSICIAN ASSISTANT

## 2021-10-04 PROCEDURE — 1159F MED LIST DOCD IN RCRD: CPT | Mod: CPTII,S$GLB,, | Performed by: PHYSICIAN ASSISTANT

## 2021-10-04 PROCEDURE — 1159F PR MEDICATION LIST DOCUMENTED IN MEDICAL RECORD: ICD-10-PCS | Mod: CPTII,S$GLB,, | Performed by: PHYSICIAN ASSISTANT

## 2021-10-04 PROCEDURE — 92557 PR COMPREHENSIVE HEARING TEST: ICD-10-PCS | Mod: S$GLB,,, | Performed by: AUDIOLOGIST-HEARING AID FITTER

## 2021-10-04 PROCEDURE — 99213 OFFICE O/P EST LOW 20 MIN: CPT | Mod: S$GLB,,, | Performed by: PHYSICIAN ASSISTANT

## 2021-10-04 PROCEDURE — 92567 PR TYMPA2METRY: ICD-10-PCS | Mod: S$GLB,,, | Performed by: AUDIOLOGIST-HEARING AID FITTER

## 2021-10-04 PROCEDURE — 99999 PR PBB SHADOW E&M-EST. PATIENT-LVL IV: CPT | Mod: PBBFAC,,, | Performed by: PHYSICIAN ASSISTANT

## 2021-10-04 PROCEDURE — 1126F PR PAIN SEVERITY QUANTIFIED, NO PAIN PRESENT: ICD-10-PCS | Mod: CPTII,S$GLB,, | Performed by: PHYSICIAN ASSISTANT

## 2021-10-04 PROCEDURE — 92557 COMPREHENSIVE HEARING TEST: CPT | Mod: S$GLB,,, | Performed by: AUDIOLOGIST-HEARING AID FITTER

## 2021-10-04 PROCEDURE — 92567 TYMPANOMETRY: CPT | Mod: S$GLB,,, | Performed by: AUDIOLOGIST-HEARING AID FITTER

## 2021-10-05 ENCOUNTER — TELEPHONE (OUTPATIENT)
Dept: CARDIOLOGY | Facility: CLINIC | Age: 76
End: 2021-10-05

## 2021-10-05 DIAGNOSIS — I50.22 CHRONIC SYSTOLIC CONGESTIVE HEART FAILURE: Primary | ICD-10-CM

## 2021-10-08 DIAGNOSIS — I75.021 BLUE TOE SYNDROME OF RIGHT LOWER EXTREMITY: ICD-10-CM

## 2021-10-08 DIAGNOSIS — I73.9 PAD (PERIPHERAL ARTERY DISEASE): ICD-10-CM

## 2021-10-09 RX ORDER — CILOSTAZOL 50 MG/1
TABLET ORAL
Qty: 60 TABLET | Refills: 0 | Status: SHIPPED | OUTPATIENT
Start: 2021-10-09 | End: 2021-11-04

## 2021-10-14 DIAGNOSIS — F41.9 ANXIETY: ICD-10-CM

## 2021-10-15 RX ORDER — ALPRAZOLAM 0.5 MG/1
0.5 TABLET ORAL 3 TIMES DAILY
Qty: 90 TABLET | Refills: 0 | Status: SHIPPED | OUTPATIENT
Start: 2021-10-15 | End: 2021-11-15

## 2021-11-16 ENCOUNTER — TELEPHONE (OUTPATIENT)
Dept: TRANSPLANT | Facility: CLINIC | Age: 76
End: 2021-11-16
Payer: MEDICARE

## 2021-11-16 DIAGNOSIS — R00.2 HEART PALPITATIONS: Primary | ICD-10-CM

## 2021-11-17 ENCOUNTER — TELEPHONE (OUTPATIENT)
Dept: INTERNAL MEDICINE | Facility: CLINIC | Age: 76
End: 2021-11-17
Payer: MEDICARE

## 2021-11-30 DIAGNOSIS — I75.021 BLUE TOE SYNDROME OF RIGHT LOWER EXTREMITY: ICD-10-CM

## 2021-11-30 DIAGNOSIS — I73.9 PAD (PERIPHERAL ARTERY DISEASE): ICD-10-CM

## 2021-11-30 RX ORDER — CILOSTAZOL 50 MG/1
TABLET ORAL
Qty: 60 TABLET | Refills: 0 | Status: SHIPPED | OUTPATIENT
Start: 2021-11-30 | End: 2021-12-22

## 2021-12-08 DIAGNOSIS — F41.9 ANXIETY: ICD-10-CM

## 2021-12-08 DIAGNOSIS — R00.2 HEART PALPITATIONS: ICD-10-CM

## 2021-12-09 RX ORDER — PRAVASTATIN SODIUM 40 MG/1
TABLET ORAL
Qty: 90 TABLET | Refills: 1 | Status: SHIPPED | OUTPATIENT
Start: 2021-12-09 | End: 2022-03-31 | Stop reason: SDUPTHER

## 2021-12-09 RX ORDER — DULOXETIN HYDROCHLORIDE 20 MG/1
CAPSULE, DELAYED RELEASE ORAL
Qty: 90 CAPSULE | Refills: 2 | Status: SHIPPED | OUTPATIENT
Start: 2021-12-09 | End: 2022-03-31

## 2021-12-09 RX ORDER — METOPROLOL SUCCINATE 25 MG/1
TABLET, EXTENDED RELEASE ORAL
Qty: 90 TABLET | Refills: 2 | Status: SHIPPED | OUTPATIENT
Start: 2021-12-09 | End: 2022-09-07

## 2021-12-13 ENCOUNTER — TELEPHONE (OUTPATIENT)
Dept: CARDIOLOGY | Facility: CLINIC | Age: 76
End: 2021-12-13
Payer: MEDICARE

## 2021-12-23 ENCOUNTER — TELEPHONE (OUTPATIENT)
Dept: TRANSPLANT | Facility: CLINIC | Age: 76
End: 2021-12-23
Payer: MEDICARE

## 2021-12-23 DIAGNOSIS — I50.22 CHRONIC SYSTOLIC CONGESTIVE HEART FAILURE: Primary | ICD-10-CM

## 2021-12-27 ENCOUNTER — TELEPHONE (OUTPATIENT)
Dept: INTERNAL MEDICINE | Facility: CLINIC | Age: 76
End: 2021-12-27

## 2021-12-27 NOTE — TELEPHONE ENCOUNTER
----- Message from Rossy Pete sent at 12/27/2021  3:23 PM CST -----  Contact: Pt  Type:  Patient Returning Call    Who Called: pt   Who Left Message for Patient:nurse   Does the patient know what this is regarding?:not getting a call for her appt at 230   Would the patient rather a call back or a response via [facility]? Phone   Best Call Back Number:[phone]  Additional Information:

## 2021-12-28 NOTE — TELEPHONE ENCOUNTER
----- Message from Rosa Dennison sent at 12/28/2021  7:52 AM CST -----  Contact: SELF/[phone]  Patient is calling regarding her virtual appt on 12-27-21, she would like a call back at [phone]. Thanks/ar

## 2021-12-29 ENCOUNTER — TELEPHONE (OUTPATIENT)
Dept: INTERNAL MEDICINE | Facility: CLINIC | Age: 76
End: 2021-12-29
Payer: MEDICARE

## 2022-01-11 DIAGNOSIS — F41.9 ANXIETY: ICD-10-CM

## 2022-01-11 RX ORDER — ALPRAZOLAM 0.5 MG/1
0.5 TABLET ORAL 3 TIMES DAILY
Qty: 90 TABLET | Refills: 0 | Status: CANCELLED | OUTPATIENT
Start: 2022-01-11

## 2022-01-11 NOTE — TELEPHONE ENCOUNTER
No new care gaps identified.  Powered by [employer] by [employer]. Reference number: [id number].   1/11/2022 9:12:48 AM CST

## 2022-01-13 ENCOUNTER — PATIENT MESSAGE (OUTPATIENT)
Dept: INTERNAL MEDICINE | Facility: CLINIC | Age: 77
End: 2022-01-13
Payer: MEDICARE

## 2022-01-13 DIAGNOSIS — F41.9 ANXIETY: ICD-10-CM

## 2022-01-13 RX ORDER — ALPRAZOLAM 0.5 MG/1
0.5 TABLET ORAL 3 TIMES DAILY
Qty: 90 TABLET | Refills: 0 | Status: SHIPPED | OUTPATIENT
Start: 2022-01-13 | End: 2022-02-15

## 2022-01-13 NOTE — TELEPHONE ENCOUNTER
No new care gaps identified.  Powered by [employer] by [employer]. Reference number: [id number].   1/13/2022 1:24:06 PM CST

## 2022-01-14 ENCOUNTER — PES CALL (OUTPATIENT)
Dept: ADMINISTRATIVE | Facility: CLINIC | Age: 77
End: 2022-01-14
Payer: MEDICARE

## 2022-01-20 DIAGNOSIS — K21.9 GASTROESOPHAGEAL REFLUX DISEASE WITHOUT ESOPHAGITIS: ICD-10-CM

## 2022-01-20 RX ORDER — PANTOPRAZOLE SODIUM 40 MG/1
TABLET, DELAYED RELEASE ORAL
Qty: 90 TABLET | Refills: 0 | Status: SHIPPED | OUTPATIENT
Start: 2022-01-20 | End: 2022-04-28

## 2022-01-20 NOTE — TELEPHONE ENCOUNTER
No new care gaps identified.  Powered by [employer] by [employer]. Reference number: [id number].   1/20/2022 9:57:59 AM CST

## 2022-02-11 DIAGNOSIS — F41.9 ANXIETY: ICD-10-CM

## 2022-02-11 RX ORDER — ALPRAZOLAM 0.5 MG/1
0.5 TABLET ORAL 3 TIMES DAILY
Qty: 90 TABLET | Refills: 0 | Status: CANCELLED | OUTPATIENT
Start: 2022-02-11

## 2022-02-11 NOTE — TELEPHONE ENCOUNTER
No new care gaps identified.  Powered by [employer] by [employer]. Reference number: [id number].   2/11/2022 3:54:11 PM CST

## 2022-02-11 NOTE — TELEPHONE ENCOUNTER
----- Message from Malissa Hickey sent at 2/11/2022  3:40 PM CST -----  Pt called requesting a call back , please give a call back at .[phone]

## 2022-02-14 DIAGNOSIS — F41.9 ANXIETY: ICD-10-CM

## 2022-02-14 NOTE — TELEPHONE ENCOUNTER
No new care gaps identified.  Powered by [employer] by [employer]. Reference number: [id number].   2/14/2022 9:35:20 AM CST

## 2022-02-15 RX ORDER — ALPRAZOLAM 0.5 MG/1
TABLET ORAL
Qty: 90 TABLET | Refills: 0 | Status: SHIPPED | OUTPATIENT
Start: 2022-02-15 | End: 2022-03-15 | Stop reason: SDUPTHER

## 2022-02-15 NOTE — TELEPHONE ENCOUNTER
----- Message from Chata Lozano MA sent at 2/15/2022  8:22 AM CST -----  Contact: self  Hey can you fax the prescription for this lady's xanax. Looks like he approved it today and she's calling about it  ----- Message -----  From: Jazlyn Mendoza  Sent: 2/15/2022   8:18 AM CST  To: Sumanth LOPEZ Staff    Patient states she has left a message several times. She needs her xanax called out to: [facility] [id number]  [address]  [place] [address]  Phone: [phone] Fax: [phone]     If she has to be seen in order to have it refilled, she will see anyone who is available soon as possible. Very anxious. Please call back at [phone]

## 2022-02-17 ENCOUNTER — TELEPHONE (OUTPATIENT)
Dept: CARDIOLOGY | Facility: HOSPITAL | Age: 77
End: 2022-02-17
Payer: MEDICARE

## 2022-03-07 ENCOUNTER — PATIENT MESSAGE (OUTPATIENT)
Dept: INTERNAL MEDICINE | Facility: CLINIC | Age: 77
End: 2022-03-07
Payer: MEDICARE

## 2022-03-07 DIAGNOSIS — F41.9 ANXIETY: ICD-10-CM

## 2022-03-07 DIAGNOSIS — L29.9 ITCHING: ICD-10-CM

## 2022-03-07 DIAGNOSIS — M47.817 LUMBOSACRAL SPONDYLOSIS WITHOUT MYELOPATHY: ICD-10-CM

## 2022-03-07 DIAGNOSIS — M16.11 ARTHRITIS OF RIGHT HIP: ICD-10-CM

## 2022-03-07 DIAGNOSIS — M16.12 ARTHRITIS OF LEFT HIP: ICD-10-CM

## 2022-03-07 RX ORDER — HYDROXYZINE HYDROCHLORIDE 50 MG/1
TABLET, FILM COATED ORAL
Qty: 40 TABLET | Refills: 0 | OUTPATIENT
Start: 2022-03-07

## 2022-03-07 RX ORDER — ALPRAZOLAM 0.5 MG/1
0.5 TABLET ORAL 3 TIMES DAILY
Qty: 90 TABLET | Refills: 0 | OUTPATIENT
Start: 2022-03-07

## 2022-03-07 RX ORDER — MELOXICAM 15 MG/1
15 TABLET ORAL DAILY
Qty: 30 TABLET | Refills: 0 | OUTPATIENT
Start: 2022-03-07

## 2022-03-07 NOTE — TELEPHONE ENCOUNTER
No new care gaps identified.  Powered by [employer] by [employer]. Reference number: [id number].   3/07/2022 11:28:19 AM CST

## 2022-03-15 ENCOUNTER — TELEPHONE (OUTPATIENT)
Dept: INTERNAL MEDICINE | Facility: CLINIC | Age: 77
End: 2022-03-15
Payer: MEDICARE

## 2022-03-15 DIAGNOSIS — F41.9 ANXIETY: ICD-10-CM

## 2022-03-15 DIAGNOSIS — L29.9 ITCHING: ICD-10-CM

## 2022-03-15 RX ORDER — HYDROXYZINE HYDROCHLORIDE 50 MG/1
TABLET, FILM COATED ORAL
Qty: 40 TABLET | Refills: 0 | Status: SHIPPED | OUTPATIENT
Start: 2022-03-15 | End: 2022-03-31 | Stop reason: SDUPTHER

## 2022-03-15 RX ORDER — ALPRAZOLAM 0.5 MG/1
0.5 TABLET ORAL 3 TIMES DAILY
Qty: 90 TABLET | Refills: 0 | Status: SHIPPED | OUTPATIENT
Start: 2022-03-15 | End: 2022-04-13 | Stop reason: SDUPTHER

## 2022-03-15 NOTE — TELEPHONE ENCOUNTER
----- Message from Cristiana Delgado sent at 3/15/2022  2:29 PM CDT -----  Contact: Patient, [phone]  Patient is returning a phone call.  Who left a message for the patient: Uyen  Does patient know what this is regarding:  Appointment  Would you like a call back, or a response through your [facility] portal?:   Call back  Comments:  Missed your call, please call her back. Thanks.

## 2022-03-15 NOTE — TELEPHONE ENCOUNTER
Pt would like to see Dr. Julio only, sooner than next available appointment in May. Please assist in scheduling pt. Thank you.

## 2022-03-15 NOTE — TELEPHONE ENCOUNTER
----- Message from Cristiana Delgado sent at 3/15/2022 12:28 PM CDT -----  Contact: Patient, [phone]  Calling to inquire about refill request. Please call her. Thanks.

## 2022-03-15 NOTE — TELEPHONE ENCOUNTER
Care Due:                  Date            Visit Type   Department     Provider  --------------------------------------------------------------------------------                                EP -                              PRIMARY      [facility] INTER[facility]  Last Visit: 08-      [facility]   MEDICINE       Odessa Julio  Next Visit: None Scheduled  None         None Found                                                            Last  Test          Frequency    Reason                     Performed    Due Date  --------------------------------------------------------------------------------    CMP.........  12 months..  fenofibrate, pravastatin.  06- 06-    Lipid Panel.  12 months..  fenofibrate, pravastatin.  06- 06-    Powered by [employer] by [employer]. Reference number: [id number].   3/15/2022 1:21:38 PM CDT

## 2022-03-31 ENCOUNTER — OFFICE VISIT (OUTPATIENT)
Dept: INTERNAL MEDICINE | Facility: CLINIC | Age: 77
End: 2022-03-31
Payer: MEDICARE

## 2022-03-31 ENCOUNTER — LAB VISIT (OUTPATIENT)
Dept: LAB | Facility: HOSPITAL | Age: 77
End: 2022-03-31
Attending: FAMILY MEDICINE
Payer: MEDICARE

## 2022-03-31 VITALS
HEIGHT: 66 IN | SYSTOLIC BLOOD PRESSURE: 116 MMHG | BODY MASS INDEX: 22.92 KG/M2 | HEART RATE: 82 BPM | TEMPERATURE: 99 F | OXYGEN SATURATION: 97 % | WEIGHT: 142.63 LBS | DIASTOLIC BLOOD PRESSURE: 72 MMHG

## 2022-03-31 DIAGNOSIS — R60.9 SWELLING: ICD-10-CM

## 2022-03-31 DIAGNOSIS — R53.83 FATIGUE, UNSPECIFIED TYPE: ICD-10-CM

## 2022-03-31 DIAGNOSIS — R26.9 UNSPECIFIED ABNORMALITIES OF GAIT AND MOBILITY: ICD-10-CM

## 2022-03-31 DIAGNOSIS — M46.94 UNSPECIFIED INFLAMMATORY SPONDYLOPATHY, THORACIC REGION: ICD-10-CM

## 2022-03-31 DIAGNOSIS — F41.9 ANXIETY: ICD-10-CM

## 2022-03-31 DIAGNOSIS — I50.22 CHRONIC SYSTOLIC CONGESTIVE HEART FAILURE: ICD-10-CM

## 2022-03-31 DIAGNOSIS — I70.219 ATHEROSCLEROTIC PVD WITH INTERMITTENT CLAUDICATION: ICD-10-CM

## 2022-03-31 DIAGNOSIS — L29.9 ITCHING: ICD-10-CM

## 2022-03-31 DIAGNOSIS — L29.9 ITCHING: Primary | ICD-10-CM

## 2022-03-31 DIAGNOSIS — Z12.31 SCREENING MAMMOGRAM FOR BREAST CANCER: ICD-10-CM

## 2022-03-31 DIAGNOSIS — N18.31 CHRONIC KIDNEY DISEASE, STAGE 3A: ICD-10-CM

## 2022-03-31 LAB
ALBUMIN SERPL BCP-MCNC: 4.1 G/DL (ref 3.5–5.2)
ALP SERPL-CCNC: 54 U/L (ref 55–135)
ALT SERPL W/O P-5'-P-CCNC: 5 U/L (ref 10–44)
ANION GAP SERPL CALC-SCNC: 8 MMOL/L (ref 8–16)
AST SERPL-CCNC: 13 U/L (ref 10–40)
BASOPHILS # BLD AUTO: 0.05 K/UL (ref 0–0.2)
BASOPHILS NFR BLD: 0.5 % (ref 0–1.9)
BILIRUB SERPL-MCNC: 0.2 MG/DL (ref 0.1–1)
BUN SERPL-MCNC: 28 MG/DL (ref 8–23)
CALCIUM SERPL-MCNC: 10.2 MG/DL (ref 8.7–10.5)
CHLORIDE SERPL-SCNC: 95 MMOL/L (ref 95–110)
CO2 SERPL-SCNC: 28 MMOL/L (ref 23–29)
CREAT SERPL-MCNC: 1.3 MG/DL (ref 0.5–1.4)
DIFFERENTIAL METHOD: ABNORMAL
EOSINOPHIL # BLD AUTO: 1.1 K/UL (ref 0–0.5)
EOSINOPHIL NFR BLD: 12 % (ref 0–8)
ERYTHROCYTE [DISTWIDTH] IN BLOOD BY AUTOMATED COUNT: 13 % (ref 11.5–14.5)
EST. GFR  (AFRICAN AMERICAN): 46.4 ML/MIN/1.73 M^2
EST. GFR  (NON AFRICAN AMERICAN): 40.2 ML/MIN/1.73 M^2
GLUCOSE SERPL-MCNC: 94 MG/DL (ref 70–110)
HCT VFR BLD AUTO: 32.4 % (ref 37–48.5)
HGB BLD-MCNC: 10.6 G/DL (ref 12–16)
IMM GRANULOCYTES # BLD AUTO: 0.08 K/UL (ref 0–0.04)
IMM GRANULOCYTES NFR BLD AUTO: 0.9 % (ref 0–0.5)
LYMPHOCYTES # BLD AUTO: 1.3 K/UL (ref 1–4.8)
LYMPHOCYTES NFR BLD: 14 % (ref 18–48)
MCH RBC QN AUTO: 29.3 PG (ref 27–31)
MCHC RBC AUTO-ENTMCNC: 32.7 G/DL (ref 32–36)
MCV RBC AUTO: 90 FL (ref 82–98)
MONOCYTES # BLD AUTO: 0.8 K/UL (ref 0.3–1)
MONOCYTES NFR BLD: 8.6 % (ref 4–15)
NEUTROPHILS # BLD AUTO: 5.9 K/UL (ref 1.8–7.7)
NEUTROPHILS NFR BLD: 64 % (ref 38–73)
NRBC BLD-RTO: 0 /100 WBC
PLATELET # BLD AUTO: 296 K/UL (ref 150–450)
PMV BLD AUTO: 9.7 FL (ref 9.2–12.9)
POTASSIUM SERPL-SCNC: 5 MMOL/L (ref 3.5–5.1)
PROT SERPL-MCNC: 7 G/DL (ref 6–8.4)
RBC # BLD AUTO: 3.62 M/UL (ref 4–5.4)
SODIUM SERPL-SCNC: 131 MMOL/L (ref 136–145)
VIT B12 SERPL-MCNC: 291 PG/ML (ref 210–950)
WBC # BLD AUTO: 9.28 K/UL (ref 3.9–12.7)

## 2022-03-31 PROCEDURE — 99214 PR OFFICE/OUTPT VISIT, EST, LEVL IV, 30-39 MIN: ICD-10-PCS | Mod: S$GLB,,, | Performed by: FAMILY MEDICINE

## 2022-03-31 PROCEDURE — 36415 COLL VENOUS BLD VENIPUNCTURE: CPT | Performed by: FAMILY MEDICINE

## 2022-03-31 PROCEDURE — 3288F FALL RISK ASSESSMENT DOCD: CPT | Mod: CPTII,S$GLB,, | Performed by: FAMILY MEDICINE

## 2022-03-31 PROCEDURE — 3074F SYST BP LT 130 MM HG: CPT | Mod: CPTII,S$GLB,, | Performed by: FAMILY MEDICINE

## 2022-03-31 PROCEDURE — 3288F PR FALLS RISK ASSESSMENT DOCUMENTED: ICD-10-PCS | Mod: CPTII,S$GLB,, | Performed by: FAMILY MEDICINE

## 2022-03-31 PROCEDURE — 1101F PT FALLS ASSESS-DOCD LE1/YR: CPT | Mod: CPTII,S$GLB,, | Performed by: FAMILY MEDICINE

## 2022-03-31 PROCEDURE — 85025 COMPLETE CBC W/AUTO DIFF WBC: CPT | Performed by: FAMILY MEDICINE

## 2022-03-31 PROCEDURE — 1125F PR PAIN SEVERITY QUANTIFIED, PAIN PRESENT: ICD-10-PCS | Mod: CPTII,S$GLB,, | Performed by: FAMILY MEDICINE

## 2022-03-31 PROCEDURE — 99214 OFFICE O/P EST MOD 30 MIN: CPT | Mod: S$GLB,,, | Performed by: FAMILY MEDICINE

## 2022-03-31 PROCEDURE — 1159F PR MEDICATION LIST DOCUMENTED IN MEDICAL RECORD: ICD-10-PCS | Mod: CPTII,S$GLB,, | Performed by: FAMILY MEDICINE

## 2022-03-31 PROCEDURE — 1101F PR PT FALLS ASSESS DOC 0-1 FALLS W/OUT INJ PAST YR: ICD-10-PCS | Mod: CPTII,S$GLB,, | Performed by: FAMILY MEDICINE

## 2022-03-31 PROCEDURE — 1159F MED LIST DOCD IN RCRD: CPT | Mod: CPTII,S$GLB,, | Performed by: FAMILY MEDICINE

## 2022-03-31 PROCEDURE — 3078F DIAST BP <80 MM HG: CPT | Mod: CPTII,S$GLB,, | Performed by: FAMILY MEDICINE

## 2022-03-31 PROCEDURE — 82607 VITAMIN B-12: CPT | Performed by: FAMILY MEDICINE

## 2022-03-31 PROCEDURE — 80053 COMPREHEN METABOLIC PANEL: CPT | Performed by: FAMILY MEDICINE

## 2022-03-31 PROCEDURE — 99499 RISK ADDL DX/OHS AUDIT: ICD-10-PCS | Mod: S$GLB,,, | Performed by: FAMILY MEDICINE

## 2022-03-31 PROCEDURE — 99499 UNLISTED E&M SERVICE: CPT | Mod: S$GLB,,, | Performed by: FAMILY MEDICINE

## 2022-03-31 PROCEDURE — 3074F PR MOST RECENT SYSTOLIC BLOOD PRESSURE < 130 MM HG: ICD-10-PCS | Mod: CPTII,S$GLB,, | Performed by: FAMILY MEDICINE

## 2022-03-31 PROCEDURE — 99999 PR PBB SHADOW E&M-EST. PATIENT-LVL IV: ICD-10-PCS | Mod: PBBFAC,,, | Performed by: FAMILY MEDICINE

## 2022-03-31 PROCEDURE — 99999 PR PBB SHADOW E&M-EST. PATIENT-LVL IV: CPT | Mod: PBBFAC,,, | Performed by: FAMILY MEDICINE

## 2022-03-31 PROCEDURE — 1125F AMNT PAIN NOTED PAIN PRSNT: CPT | Mod: CPTII,S$GLB,, | Performed by: FAMILY MEDICINE

## 2022-03-31 PROCEDURE — 3078F PR MOST RECENT DIASTOLIC BLOOD PRESSURE < 80 MM HG: ICD-10-PCS | Mod: CPTII,S$GLB,, | Performed by: FAMILY MEDICINE

## 2022-03-31 RX ORDER — PRAVASTATIN SODIUM 40 MG/1
40 TABLET ORAL DAILY
Qty: 90 TABLET | Refills: 1 | Status: SHIPPED | OUTPATIENT
Start: 2022-03-31 | End: 2022-12-14

## 2022-03-31 RX ORDER — AMMONIUM LACTATE 12 G/100G
LOTION TOPICAL
Qty: 225 G | Refills: 2 | Status: SHIPPED | OUTPATIENT
Start: 2022-03-31

## 2022-03-31 RX ORDER — HYDROXYZINE HYDROCHLORIDE 50 MG/1
TABLET, FILM COATED ORAL
Qty: 60 TABLET | Refills: 0 | Status: SHIPPED | OUTPATIENT
Start: 2022-03-31 | End: 2022-05-11 | Stop reason: SDUPTHER

## 2022-03-31 RX ORDER — HYDROCHLOROTHIAZIDE 12.5 MG/1
12.5 CAPSULE ORAL DAILY
Qty: 90 CAPSULE | Refills: 3 | Status: SHIPPED | OUTPATIENT
Start: 2022-03-31 | End: 2023-02-17

## 2022-03-31 NOTE — PROGRESS NOTES
Subjective:       Patient ID: Renee Arteaga is a 76 y.o. female.    Chief Complaint: Results (labs) and Fatigue    HPI Ms. Arteaga presents today for follow up.     No energy  No desire to get out and go    Lots of changes in the past 5 years    Memory loss  Shake/tremors   Feels weak   Does not feel like exercising.     She does not think anything will help with her depression with all that she has gone through  Hears her daughter's voice  Dreams about daughter and   Does not feel that therapy would be effective      Last labs 6-7 months ago    Itching  Dermatology gave oil and lotion  Helps for short periods     Review of Systems   Constitutional: Positive for activity change, appetite change and fatigue.   HENT: Negative.    Respiratory: Negative.    Gastrointestinal: Negative.    Genitourinary: Negative.    Musculoskeletal: Positive for back pain and gait problem.   Psychiatric/Behavioral: Positive for dysphoric mood. Negative for suicidal ideas.           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 4/12/2018    Colon polyp 03/14/2015    Depression     Heart murmur     Hyperlipidemia     Scoliosis     Tachycardia      Past Surgical History:   Procedure Laterality Date    BACK SURGERY      bladder/rectum lift      BREAST BIOPSY Left 2018    benign    BREAST SURGERY      HYSTERECTOMY      OOPHORECTOMY       Family History   Problem Relation Age of Onset    Arthritis Mother     Lung disease Mother     Heart disease Father      Social History     Socioeconomic History    Marital status:    Tobacco Use    Smoking status: Never Smoker    Smokeless tobacco: Never Used   Substance and Sexual Activity    Alcohol use: No    Drug use: No       Objective:        Physical Exam  Constitutional:       Appearance:  Normal appearance.   Pulmonary:      Effort: Pulmonary effort is normal.   Neurological:      Mental Status: She is alert and oriented to person, place, and time.   Psychiatric:         Mood and Affect: Mood normal.         Behavior: Behavior normal.      Comments: depressed           Results for orders placed or performed in visit on 08/27/21   Basic Metabolic Panel   Result Value Ref Range    Sodium 127 (L) 136 - 145 mmol/L    Potassium 4.8 3.5 - 5.1 mmol/L    Chloride 93 (L) 95 - 110 mmol/L    CO2 25 23 - 29 mmol/L    Glucose 97 70 - 110 mg/dL    BUN 15 8 - 23 mg/dL    Creatinine 1.1 0.5 - 1.4 mg/dL    Calcium 10.0 8.7 - 10.5 mg/dL    Anion Gap 9 8 - 16 mmol/L    eGFR if African American 56.8 (A) >60 mL/min/1.73 m^2    eGFR if non  49.2 (A) >60 mL/min/1.73 m^2   CBC Auto Differential   Result Value Ref Range    WBC 9.38 3.90 - 12.70 K/uL    RBC 3.58 (L) 4.00 - 5.40 M/uL    Hemoglobin 11.1 (L) 12.0 - 16.0 g/dL    Hematocrit 34.0 (L) 37.0 - 48.5 %    MCV 95 82 - 98 fL    MCH 31.0 27.0 - 31.0 pg    MCHC 32.6 32.0 - 36.0 g/dL    RDW 12.9 11.5 - 14.5 %    Platelets 338 150 - 450 K/uL    MPV 10.2 9.2 - 12.9 fL    Immature Granulocytes 1.6 (H) 0.0 - 0.5 %    Gran # (ANC) 6.6 1.8 - 7.7 K/uL    Immature Grans (Abs) 0.15 (H) 0.00 - 0.04 K/uL    Lymph # 1.3 1.0 - 4.8 K/uL    Mono # 0.7 0.3 - 1.0 K/uL    Eos # 0.6 (H) 0.0 - 0.5 K/uL    Baso # 0.06 0.00 - 0.20 K/uL    nRBC 0 0 /100 WBC    Gran % 69.8 38.0 - 73.0 %    Lymph % 14.3 (L) 18.0 - 48.0 %    Mono % 7.5 4.0 - 15.0 %    Eosinophil % 6.2 0.0 - 8.0 %    Basophil % 0.6 0.0 - 1.9 %    Differential Method Automated        Assessment/Plan:   Itching  -     Comprehensive Metabolic Panel; Future; Expected date: 03/31/2022  -     hydrOXYzine (ATARAX) 50 MG tablet; TAKE 1 TABLET BY MOUTH THREE TIMES DAILY AS NEEDED FOR ITCHING OR ANXIETY.  Dispense: 60 tablet; Refill: 0    Fatigue, unspecified type  -     CBC Auto Differential; Future; Expected date:  03/31/2022  -     Vitamin B12; Future; Expected date: 03/31/2022    Unspecified abnormalities of gait and mobility   -     Vitamin B12; Future; Expected date: 03/31/2022    Screening mammogram for breast cancer  -     Mammo Digital Screening Bilat w/ [facility]; Future; Expected date: 03/31/2022    Chronic kidney disease, stage 3a-sending to nephrology    Unspecified inflammatory spondylopathy, thoracic region-reports that neurosurgery will not do surgery  She has pain    Chronic systolic congestive heart failure  Atherosclerotic PVD with intermittent claudication-control blood pressure and cholesterol    Anxiety-pt takes Xanax     Swelling  -     hydroCHLOROthiazide (MICROZIDE) 12.5 mg capsule; Take 1 capsule (12.5 mg total) by mouth once daily.  Dispense: 90 capsule; Refill: 3    Other orders  -     pravastatin (PRAVACHOL) 40 MG tablet; Take 1 tablet (40 mg total) by mouth once daily.  Dispense: 90 tablet; Refill: 1  -     ammonium lactate (LAC-HYDRIN) 12 % lotion; Apply topically as needed for Dry Skin.  Dispense: 225 g; Refill: 2          No follow-ups on file.    Odessa Julio MD  ON   Family Medicine

## 2022-03-31 NOTE — TELEPHONE ENCOUNTER
No new care gaps identified.  Powered by [employer] by [employer]. Reference number: [id number].   3/31/2022 11:12:40 AM CDT

## 2022-04-01 ENCOUNTER — PATIENT MESSAGE (OUTPATIENT)
Dept: INTERNAL MEDICINE | Facility: CLINIC | Age: 77
End: 2022-04-01
Payer: MEDICARE

## 2022-04-01 DIAGNOSIS — R94.4 DECREASED GFR: Primary | ICD-10-CM

## 2022-04-01 DIAGNOSIS — M47.817 LUMBOSACRAL SPONDYLOSIS WITHOUT MYELOPATHY: ICD-10-CM

## 2022-04-01 DIAGNOSIS — M16.11 ARTHRITIS OF RIGHT HIP: ICD-10-CM

## 2022-04-01 DIAGNOSIS — M16.12 ARTHRITIS OF LEFT HIP: ICD-10-CM

## 2022-04-01 RX ORDER — SERTRALINE HYDROCHLORIDE 100 MG/1
TABLET, FILM COATED ORAL
Qty: 180 TABLET | Refills: 3 | Status: SHIPPED | OUTPATIENT
Start: 2022-04-01 | End: 2023-04-13 | Stop reason: SDUPTHER

## 2022-04-01 NOTE — TELEPHONE ENCOUNTER
Patient is scheduled to see Nephrology virtually on 4/13/22 for 8:30 am. Patient verbally understood the information.

## 2022-04-01 NOTE — TELEPHONE ENCOUNTER
Refill Routing Note   Medication(s) are not appropriate for processing by [facility] for the following reason(s):      - Refused 1 week ago, defer to you    [facility] action(s):  Defer       Medication Therapy Plan: Previously refused due to med clarification requirement, defer to you  --->Care Gap information included in message below if applicable.   Medication reconciliation completed: No   Automatic Epic Generated Protocol Data:        Requested Prescriptions   Pending Prescriptions Disp Refills    sertraline (ZOLOFT) 100 MG tablet [Pharmacy Med Name: SERTRALINE 100MG*] 180 tablet 3     Sig: TAKE 2 TABLETS BY MOUTH ONCE A DAY.       Psychiatry:  Antidepressants - SSRI Passed - 3/31/2022 11:12 AM        Passed - Patient is at least 18 years old        Passed - Valid encounter within last 15 months     Recent Visits  Date Type Provider Dept   03/31/22 Office Visit Odessa Julio MD [facility] Internal Medicine   08/27/21 Office Visit Odessa Julio MD [facility] Internal Medicine   06/16/21 Office Visit Odessa Julio MD [facility] Internal Medicine   01/06/21 Office Visit Odessa Julio MD [facility] Internal Medicine   08/31/20 Office Visit Odessa Julio MD [facility] Internal Medicine   Showing recent visits within past 720 days and meeting all other requirements  Future Appointments  No visits were found meeting these conditions.  Showing future appointments within next 150 days and meeting all other requirements      Future Appointments              In 5 days CARDIOVASCULAR 2, [facility]    In 3 weeks [facility], [facility]                Passed - Matches previous order       Previous Authorizing Provider: Odessa Julio MD (sertraline (ZOLOFT) 100 MG tablet)  Previous Pharmacy: [facility] [id number]            Passed - No ED/Hospital visits since last PCP visit     Last PCP Visit: 3/31/2022 Last Admission: 4/25/2018 Last ED Visit:                  Appointments  past 12m or future 3m with PCP    Date Provider   Last Visit   8/27/2021 Odessa Julio MD   Next Visit   3/31/2022 Odessa Julio MD   ED visits in past 90 days: 0        Note composed:10:07 AM 04/01/2022

## 2022-04-06 ENCOUNTER — HOSPITAL ENCOUNTER (OUTPATIENT)
Dept: CARDIOLOGY | Facility: HOSPITAL | Age: 77
Discharge: HOME | End: 2022-04-06
Attending: NURSE PRACTITIONER
Payer: MEDICARE

## 2022-04-06 VITALS
DIASTOLIC BLOOD PRESSURE: 72 MMHG | WEIGHT: 142 LBS | HEIGHT: 66 IN | BODY MASS INDEX: 22.82 KG/M2 | SYSTOLIC BLOOD PRESSURE: 116 MMHG

## 2022-04-06 DIAGNOSIS — I50.22 CHRONIC SYSTOLIC CONGESTIVE HEART FAILURE: ICD-10-CM

## 2022-04-06 LAB
AORTIC ROOT ANNULUS: 3.07 CM
ASCENDING AORTA: 3.12 CM
AV INDEX (PROSTH): 0.69
AV MEAN GRADIENT: 5 MMHG
AV PEAK GRADIENT: 9 MMHG
AV REGURGITATION PRESSURE HALF TIME: 662.85 MS
AV VALVE AREA: 2.31 CM2
AV VELOCITY RATIO: 0.64
BSA FOR ECHO PROCEDURE: 1.73 M2
CV ECHO LV RWT: 0.4 CM
DOP CALC AO PEAK VEL: 1.46 M/S
DOP CALC AO VTI: 32.3 CM
DOP CALC LVOT AREA: 3.3 CM2
DOP CALC LVOT DIAMETER: 2.06 CM
DOP CALC LVOT PEAK VEL: 0.94 M/S
DOP CALC LVOT STROKE VOLUME: 74.62 CM3
DOP CALC RVOT PEAK VEL: 1.21 M/S
DOP CALC RVOT VTI: 29.7 CM
DOP CALCLVOT PEAK VEL VTI: 22.4 CM
E WAVE DECELERATION TIME: 268.36 MSEC
E/A RATIO: 0.61
E/E' RATIO: 10.2 M/S
ECHO EF ESTIMATED: 73 %
ECHO LV POSTERIOR WALL: 0.92 CM (ref 0.6–1.1)
EJECTION FRACTION: 55 %
FRACTIONAL SHORTENING: 42 % (ref 28–44)
INTERVENTRICULAR SEPTUM: 0.79 CM (ref 0.6–1.1)
IVC DIAMETER: 1.39 CM
IVRT: 102.76 MSEC
LA MAJOR: 5.6 CM
LA MINOR: 5.2 CM
LA WIDTH: 5.2 CM
LEFT ATRIUM SIZE: 3.92 CM
LEFT ATRIUM VOLUME INDEX MOD: 11.2 ML/M2
LEFT ATRIUM VOLUME INDEX: 54 ML/M2
LEFT ATRIUM VOLUME MOD: 19.35 CM3
LEFT ATRIUM VOLUME: 93.43 CM3
LEFT INTERNAL DIMENSION IN SYSTOLE: 2.69 CM (ref 2.1–4)
LEFT VENTRICLE DIASTOLIC VOLUME INDEX: 57.13 ML/M2
LEFT VENTRICLE DIASTOLIC VOLUME: 98.84 ML
LEFT VENTRICLE MASS INDEX: 75 G/M2
LEFT VENTRICLE SYSTOLIC VOLUME INDEX: 15.5 ML/M2
LEFT VENTRICLE SYSTOLIC VOLUME: 26.87 ML
LEFT VENTRICULAR INTERNAL DIMENSION IN DIASTOLE: 4.63 CM (ref 3.5–6)
LEFT VENTRICULAR MASS: 130.05 G
LV LATERAL E/E' RATIO: 10.2 M/S
LV SEPTAL E/E' RATIO: 10.2 M/S
LVOT MG: 2.06 MMHG
LVOT MV: 0.69 CM/S
MV PEAK A VEL: 0.83 M/S
MV PEAK E VEL: 0.51 M/S
PISA AR MAX VEL: 4.37 M/S
PISA TR MAX VEL: 2.61 M/S
PV MEAN GRADIENT: 3.38 MMHG
PV PEAK VELOCITY: 1.2 CM/S
RA MAJOR: 4.09 CM
RA PRESSURE: 3 MMHG
RIGHT VENTRICULAR END-DIASTOLIC DIMENSION: 2.93 CM
STJ: 2.83 CM
TDI LATERAL: 0.05 M/S
TDI SEPTAL: 0.05 M/S
TDI: 0.05 M/S
TR MAX PG: 27 MMHG
TV REST PULMONARY ARTERY PRESSURE: 30 MMHG

## 2022-04-06 PROCEDURE — 93306 TTE W/DOPPLER COMPLETE: CPT | Mod: 26,,, | Performed by: INTERNAL MEDICINE

## 2022-04-06 PROCEDURE — 93306 TTE W/DOPPLER COMPLETE: CPT

## 2022-04-06 PROCEDURE — 93306 ECHO (CUPID ONLY): ICD-10-PCS | Mod: 26,,, | Performed by: INTERNAL MEDICINE

## 2022-04-06 NOTE — TELEPHONE ENCOUNTER
[facility]   Does not want a to go to [place]  Also wants to know if you will send her 2 diflucan for yeast infection   Also request refill on mobic

## 2022-04-07 ENCOUNTER — TELEPHONE (OUTPATIENT)
Dept: CARDIOLOGY | Facility: CLINIC | Age: 77
End: 2022-04-07
Payer: MEDICARE

## 2022-04-07 NOTE — TELEPHONE ENCOUNTER
Pt notified and appt made. pb    ----- Message from [employer] Link sent at 4/7/2022  2:29 PM CDT -----  -Please notify patient  -ECHO reveals normal heart function  Needs follow up appt arranged, overdue    Marianne

## 2022-04-11 NOTE — PROGRESS NOTES
Subjective:   Patient ID:  Renee Arteaga is a 77 y.o. female who presents for evaluation of Congestive Heart Failure      HPI    Renee Arteaga is a 76 year old female who presents to CHF clinic for overdue follow up.     Her current medical conditions include PVD, HTN, HLP, CHF, EF improved to 55%, LAE, [place], Idiopathic osteoporosis, CKD Stage III a. She returns today and states she is doing ok.     Over the past few weeks, she has had shortness of breath, weak upon standing, lightheaded with exertional activities and loses her balance. These events occur on a daily basis recently.     She has complaints of raynauds as well today in office.   Reports compliance with medications and dietary restrictions. NO leg swelling on exam today. Denies chest pain or anginal equivalents. NO orthopnea, PND or abdominal bloating.       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 4/12/2018    Colon polyp 03/14/2015    Depression     Heart murmur     Hyperlipidemia     Scoliosis     Tachycardia        Past Surgical History:   Procedure Laterality Date    BACK SURGERY      bladder/rectum lift      BREAST BIOPSY Left 2018    benign    BREAST SURGERY      HYSTERECTOMY      OOPHORECTOMY         Social History     Tobacco Use    Smoking status: Never Smoker    Smokeless tobacco: Never Used   Substance Use Topics    Alcohol use: No    Drug use: No       Family History   Problem Relation Age of Onset    Arthritis Mother     Lung disease Mother     Heart disease Father      Wt Readings from Last 3 Encounters:   04/12/22 64.4 kg (141 lb 15.6 oz)   04/06/22 64.4 kg (142 lb)   03/31/22 64.7 kg (142 lb 10.2 oz)     Temp Readings from Last 3 Encounters:   03/31/22 98.7 °F (37.1 °C) (Temporal)   10/04/21 98 °F (36.7 °C) (Temporal)   08/27/21 98.7 °F  (37.1 °C) (Tympanic)     BP Readings from Last 3 Encounters:   04/12/22 122/64   04/06/22 116/72   03/31/22 116/72     Pulse Readings from Last 3 Encounters:   04/12/22 76   03/31/22 82   08/27/21 93     Current Outpatient Medications on File Prior to Visit   Medication Sig Dispense Refill    ALPRAZolam (XANAX) 0.5 MG tablet Take 1 tablet (0.5 mg total) by mouth 3 (three) times daily. 90 tablet 0    ammonium lactate (LAC-HYDRIN) 12 % lotion Apply topically as needed for Dry Skin. 225 g 2    aspirin (ECOTRIN) 81 MG EC tablet Take 1 tablet by mouth Daily. 1 Tablet, Delayed Release (E.C.) Oral Every day      cilostazoL (PLETAL) 50 MG Tab TAKE 1 TABLET BY MOUTH TWICE A DAY. 60 tablet 5    fenofibrate (TRICOR) 54 MG tablet TAKE 1 TABLET BY MOUTH ONCE DAILY. 90 tablet 2    fluticasone propionate (FLONASE) 50 mcg/actuation nasal spray 1 spray (50 mcg total) by Each Nostril route once daily. 16 g 1    hydroCHLOROthiazide (MICROZIDE) 12.5 mg capsule Take 1 capsule (12.5 mg total) by mouth once daily. 90 capsule 3    hydrOXYzine (ATARAX) 50 MG tablet TAKE 1 TABLET BY MOUTH THREE TIMES DAILY AS NEEDED FOR ITCHING OR ANXIETY. 60 tablet 0    meloxicam (MOBIC) 15 MG tablet TAKE 1 TABLET BY MOUTH ONCE DAILY. 30 tablet 0    metoprolol succinate (TOPROL-XL) 25 MG 24 hr tablet TAKE 1 TABLET BY MOUTH ONCE DAILY. 90 tablet 2    montelukast (SINGULAIR) 10 mg tablet Take 1 tablet (10 mg total) by mouth every evening. 90 tablet 3    pantoprazole (PROTONIX) 40 MG tablet TAKE 1 TABLET BY MOUTH ONCE DAILY. 90 tablet 0    pravastatin (PRAVACHOL) 40 MG tablet Take 1 tablet (40 mg total) by mouth once daily. 90 tablet 1    sertraline (ZOLOFT) 100 MG tablet TAKE 2 TABLETS BY MOUTH ONCE A DAY. 180 tablet 3    [DISCONTINUED] ipratropium (ATROVENT) 21 mcg (0.03 %) nasal spray 2 SPRAYS IN EACH NOSTRIL TWICE DAILY. 30 mL 0    [DISCONTINUED] doxepin (SINEQUAN) 10 MG capsule Take 1 capsule (10 mg total) by mouth every evening. (Patient  "not taking: Reported on 4/12/2022) 14 capsule 0     No current facility-administered medications on file prior to visit.         Review of Systems   Constitutional: Positive for malaise/fatigue.   HENT: Negative for hearing loss and hoarse voice.    Eyes: Negative for blurred vision and visual disturbance.   Cardiovascular: Positive for dyspnea on exertion and palpitations. Negative for chest pain, claudication, irregular heartbeat, leg swelling, near-syncope, orthopnea, paroxysmal nocturnal dyspnea and syncope.   Respiratory: Positive for shortness of breath. Negative for cough, hemoptysis, sleep disturbances due to breathing, snoring and wheezing.    Endocrine: Negative for cold intolerance and heat intolerance.   Hematologic/Lymphatic: Does not bruise/bleed easily.   Skin: Negative for color change, dry skin and nail changes.   Musculoskeletal: Positive for arthritis and back pain. Negative for joint pain and myalgias.   Gastrointestinal: Negative for bloating, abdominal pain, constipation, nausea and vomiting.   Genitourinary: Negative for dysuria, flank pain, hematuria and hesitancy.   Neurological: Positive for dizziness and light-headedness. Negative for headaches, loss of balance, numbness, paresthesias and weakness.   Psychiatric/Behavioral: Negative for altered mental status.   Allergic/Immunologic: Negative for environmental allergies.     /64 (BP Location: Left arm, Patient Position: Sitting)   Pulse 76   Ht 5' 6" (1.676 m)   Wt 64.4 kg (141 lb 15.6 oz)   SpO2 100%   BMI 22.92 kg/m²     Objective:   Physical Exam  Vitals and nursing note reviewed.   Constitutional:       General: She is not in acute distress.     Appearance: Normal appearance. She is well-developed. She is not ill-appearing.   HENT:      Head: Normocephalic and atraumatic.      Nose: Nose normal.      Mouth/Throat:      Mouth: Mucous membranes are moist.   Eyes:      Pupils: Pupils are equal, round, and reactive to light. "   Neck:      Thyroid: No thyromegaly.      Vascular: No JVD.      Trachea: No tracheal deviation.   Cardiovascular:      Rate and Rhythm: Normal rate and regular rhythm.      Chest Wall: PMI is not displaced.      Pulses: Intact distal pulses.           Radial pulses are 2+ on the right side and 2+ on the left side.        Dorsalis pedis pulses are 2+ on the right side and 2+ on the left side.      Heart sounds: S1 normal and S2 normal. Heart sounds not distant. No murmur heard.  Pulmonary:      Effort: Pulmonary effort is normal. No respiratory distress.      Breath sounds: Normal breath sounds and air entry. No decreased breath sounds, wheezing or rales.   Abdominal:      General: Bowel sounds are normal.      Palpations: Abdomen is soft.   Musculoskeletal:         General: No swelling. Normal range of motion.      Cervical back: Full passive range of motion without pain, normal range of motion and neck supple.      Right ankle: Swelling present.      Left ankle: Swelling present.   Skin:     General: Skin is warm and dry.      Capillary Refill: Capillary refill takes less than 2 seconds.      Nails: There is no clubbing.   Neurological:      General: No focal deficit present.      Mental Status: She is alert and oriented to person, place, and time. Mental status is at baseline.   Psychiatric:         Mood and Affect: Mood normal.         Speech: Speech normal.         Behavior: Behavior normal.         Thought Content: Thought content normal.         Judgment: Judgment normal.         Lab Results   Component Value Date    CHOL 253 (H) 06/16/2021    CHOL 234 (H) 01/10/2019    CHOL 216 (H) 10/03/2018     Lab Results   Component Value Date    HDL 70 06/16/2021    HDL 73 01/10/2019    HDL 77 (H) 10/03/2018     Lab Results   Component Value Date    LDLCALC 158.0 06/16/2021    LDLCALC 140.8 01/10/2019    LDLCALC 119.4 10/03/2018     Lab Results   Component Value Date    TRIG 125 06/16/2021    TRIG 101 01/10/2019     TRIG 98 10/03/2018     Lab Results   Component Value Date    CHOLHDL 27.7 06/16/2021    CHOLHDL 31.2 01/10/2019    CHOLHDL 35.6 10/03/2018       Chemistry        Component Value Date/Time     (L) 03/31/2022 1626    K 5.0 03/31/2022 1626    CL 95 03/31/2022 1626    CO2 28 03/31/2022 1626    BUN 28 (H) 03/31/2022 1626    CREATININE 1.3 03/31/2022 1626    GLU 94 03/31/2022 1626        Component Value Date/Time    CALCIUM 10.2 03/31/2022 1626    ALKPHOS 54 (L) 03/31/2022 1626    AST 13 03/31/2022 1626    ALT 5 (L) 03/31/2022 1626    BILITOT 0.2 03/31/2022 1626    ESTGFRAFRICA 46.4 (A) 03/31/2022 1626    EGFRNONAA 40.2 (A) 03/31/2022 1626          Lab Results   Component Value Date    TSH 1.387 07/18/2013     No results found for: INR, PROTIME  Lab Results   Component Value Date    WBC 9.28 03/31/2022    HGB 10.6 (L) 03/31/2022    HCT 32.4 (L) 03/31/2022    MCV 90 03/31/2022     03/31/2022        Assessment:      1. Palpitations    2. Chronic systolic congestive heart failure    3. Atherosclerotic PVD with intermittent claudication    4. Dyslipidemia    5. Heart palpitations    6. Chronic kidney disease, stage 3a        Plan:     14 day [employer] monitor  Continue same meds for now  Dash diet 2 gm sodium   1500 ml fluid restriction  Monitor BP HR at home  RTC after monitor to discuss next steps    Nicole May, FNP-C [facility]

## 2022-04-12 ENCOUNTER — HOSPITAL ENCOUNTER (OUTPATIENT)
Dept: CARDIOLOGY | Facility: HOSPITAL | Age: 77
Discharge: HOME | End: 2022-04-12
Attending: NURSE PRACTITIONER
Payer: MEDICARE

## 2022-04-12 ENCOUNTER — OFFICE VISIT (OUTPATIENT)
Dept: CARDIOLOGY | Facility: CLINIC | Age: 77
End: 2022-04-12
Payer: MEDICARE

## 2022-04-12 ENCOUNTER — HOSPITAL ENCOUNTER (OUTPATIENT)
Dept: CARDIOLOGY | Facility: HOSPITAL | Age: 77
Discharge: HOME | End: 2022-04-12
Payer: MEDICARE

## 2022-04-12 VITALS
BODY MASS INDEX: 22.82 KG/M2 | SYSTOLIC BLOOD PRESSURE: 122 MMHG | WEIGHT: 142 LBS | DIASTOLIC BLOOD PRESSURE: 64 MMHG | HEART RATE: 76 BPM | OXYGEN SATURATION: 100 % | HEIGHT: 66 IN

## 2022-04-12 DIAGNOSIS — I50.22 CHRONIC SYSTOLIC CONGESTIVE HEART FAILURE: ICD-10-CM

## 2022-04-12 DIAGNOSIS — R00.2 PALPITATIONS: Primary | ICD-10-CM

## 2022-04-12 DIAGNOSIS — R00.2 PALPITATIONS: ICD-10-CM

## 2022-04-12 DIAGNOSIS — I70.219 ATHEROSCLEROTIC PVD WITH INTERMITTENT CLAUDICATION: ICD-10-CM

## 2022-04-12 DIAGNOSIS — E78.5 DYSLIPIDEMIA: Chronic | ICD-10-CM

## 2022-04-12 DIAGNOSIS — N18.31 CHRONIC KIDNEY DISEASE, STAGE 3A: ICD-10-CM

## 2022-04-12 DIAGNOSIS — R00.2 HEART PALPITATIONS: ICD-10-CM

## 2022-04-12 PROCEDURE — 99999 PR PBB SHADOW E&M-EST. PATIENT-LVL IV: CPT | Mod: PBBFAC,,, | Performed by: NURSE PRACTITIONER

## 2022-04-12 PROCEDURE — 3074F SYST BP LT 130 MM HG: CPT | Mod: CPTII,S$GLB,, | Performed by: NURSE PRACTITIONER

## 2022-04-12 PROCEDURE — 99214 PR OFFICE/OUTPT VISIT, EST, LEVL IV, 30-39 MIN: ICD-10-PCS | Mod: S$GLB,,, | Performed by: NURSE PRACTITIONER

## 2022-04-12 PROCEDURE — 99499 UNLISTED E&M SERVICE: CPT | Mod: S$GLB,,, | Performed by: NURSE PRACTITIONER

## 2022-04-12 PROCEDURE — 3288F FALL RISK ASSESSMENT DOCD: CPT | Mod: CPTII,S$GLB,, | Performed by: NURSE PRACTITIONER

## 2022-04-12 PROCEDURE — 3074F PR MOST RECENT SYSTOLIC BLOOD PRESSURE < 130 MM HG: ICD-10-PCS | Mod: CPTII,S$GLB,, | Performed by: NURSE PRACTITIONER

## 2022-04-12 PROCEDURE — 99214 OFFICE O/P EST MOD 30 MIN: CPT | Mod: S$GLB,,, | Performed by: NURSE PRACTITIONER

## 2022-04-12 PROCEDURE — 93247 EXT ECG>7D<15D SCAN A/R: CPT

## 2022-04-12 PROCEDURE — 1159F PR MEDICATION LIST DOCUMENTED IN MEDICAL RECORD: ICD-10-PCS | Mod: CPTII,S$GLB,, | Performed by: NURSE PRACTITIONER

## 2022-04-12 PROCEDURE — 1126F PR PAIN SEVERITY QUANTIFIED, NO PAIN PRESENT: ICD-10-PCS | Mod: CPTII,S$GLB,, | Performed by: NURSE PRACTITIONER

## 2022-04-12 PROCEDURE — 3078F DIAST BP <80 MM HG: CPT | Mod: CPTII,S$GLB,, | Performed by: NURSE PRACTITIONER

## 2022-04-12 PROCEDURE — 99499 RISK ADDL DX/OHS AUDIT: ICD-10-PCS | Mod: S$GLB,,, | Performed by: NURSE PRACTITIONER

## 2022-04-12 PROCEDURE — 93248 EXT ECG>7D<15D REV&INTERPJ: CPT | Mod: ,,, | Performed by: INTERNAL MEDICINE

## 2022-04-12 PROCEDURE — 93005 ELECTROCARDIOGRAM TRACING: CPT

## 2022-04-12 PROCEDURE — 3288F PR FALLS RISK ASSESSMENT DOCUMENTED: ICD-10-PCS | Mod: CPTII,S$GLB,, | Performed by: NURSE PRACTITIONER

## 2022-04-12 PROCEDURE — 93010 ELECTROCARDIOGRAM REPORT: CPT | Mod: ,,, | Performed by: INTERNAL MEDICINE

## 2022-04-12 PROCEDURE — 1159F MED LIST DOCD IN RCRD: CPT | Mod: CPTII,S$GLB,, | Performed by: NURSE PRACTITIONER

## 2022-04-12 PROCEDURE — 1101F PR PT FALLS ASSESS DOC 0-1 FALLS W/OUT INJ PAST YR: ICD-10-PCS | Mod: CPTII,S$GLB,, | Performed by: NURSE PRACTITIONER

## 2022-04-12 PROCEDURE — 3078F PR MOST RECENT DIASTOLIC BLOOD PRESSURE < 80 MM HG: ICD-10-PCS | Mod: CPTII,S$GLB,, | Performed by: NURSE PRACTITIONER

## 2022-04-12 PROCEDURE — 93248 CV CARDIAC MONITOR - 3-15 DAY ADULT (CUPID ONLY): ICD-10-PCS | Mod: ,,, | Performed by: INTERNAL MEDICINE

## 2022-04-12 PROCEDURE — 93010 EKG 12-LEAD: ICD-10-PCS | Mod: ,,, | Performed by: INTERNAL MEDICINE

## 2022-04-12 PROCEDURE — 1126F AMNT PAIN NOTED NONE PRSNT: CPT | Mod: CPTII,S$GLB,, | Performed by: NURSE PRACTITIONER

## 2022-04-12 PROCEDURE — 99999 PR PBB SHADOW E&M-EST. PATIENT-LVL IV: ICD-10-PCS | Mod: PBBFAC,,, | Performed by: NURSE PRACTITIONER

## 2022-04-12 PROCEDURE — 1101F PT FALLS ASSESS-DOCD LE1/YR: CPT | Mod: CPTII,S$GLB,, | Performed by: NURSE PRACTITIONER

## 2022-04-13 DIAGNOSIS — F41.9 ANXIETY: ICD-10-CM

## 2022-04-13 RX ORDER — ALPRAZOLAM 0.5 MG/1
0.5 TABLET ORAL 3 TIMES DAILY
Qty: 90 TABLET | Refills: 0 | Status: CANCELLED | OUTPATIENT
Start: 2022-04-13

## 2022-04-13 RX ORDER — ALPRAZOLAM 0.5 MG/1
0.5 TABLET ORAL 3 TIMES DAILY
Qty: 90 TABLET | Refills: 0 | Status: SHIPPED | OUTPATIENT
Start: 2022-04-13 | End: 2022-05-09

## 2022-04-13 NOTE — TELEPHONE ENCOUNTER
No new care gaps identified.  Powered by [employer]. Reference number: [id number].   4/13/2022 8:52:46 AM CDT

## 2022-04-28 DIAGNOSIS — K21.9 GASTROESOPHAGEAL REFLUX DISEASE WITHOUT ESOPHAGITIS: ICD-10-CM

## 2022-04-28 RX ORDER — PANTOPRAZOLE SODIUM 40 MG/1
TABLET, DELAYED RELEASE ORAL
Qty: 90 TABLET | Refills: 0 | Status: SHIPPED | OUTPATIENT
Start: 2022-04-28 | End: 2022-07-21

## 2022-04-28 NOTE — TELEPHONE ENCOUNTER
Refill Routing Note   Medication(s) are not appropriate for processing by [facility] for the following reason(s):      - Drug-Disease Interaction (OSTEOPOROSIS)    [facility] action(s):  Defer Medication-related problems identified: Drug-disease interaction        Medication reconciliation completed: No     Appointments  past 12m or future 3m with PCP    Date Provider   Last Visit   3/31/2022 Odessa Julio MD   Next Visit   Visit date not found Odessa Julio MD   ED visits in past 90 days: 0        Note composed:12:23 PM 04/28/2022

## 2022-04-28 NOTE — TELEPHONE ENCOUNTER
No new care gaps identified.  Powered by [employer]. Reference number: [id number].   4/28/2022 8:50:01 AM CDT

## 2022-04-29 DIAGNOSIS — K21.9 GASTROESOPHAGEAL REFLUX DISEASE WITHOUT ESOPHAGITIS: ICD-10-CM

## 2022-04-29 RX ORDER — PANTOPRAZOLE SODIUM 40 MG/1
TABLET, DELAYED RELEASE ORAL
Qty: 90 TABLET | Refills: 0 | OUTPATIENT
Start: 2022-04-29

## 2022-04-29 NOTE — TELEPHONE ENCOUNTER
Quick DC. Request already responded to by other means (e.g. phone or fax)   Refill Authorization Note   Renee Arteaga  is requesting a refill authorization.  Brief Assessment and Rationale for Refill:  Quick Discontinue  Medication Therapy Plan:       Medication Reconciliation Completed:  No      Comments:     Note composed:3:08 PM 04/29/2022

## 2022-04-29 NOTE — TELEPHONE ENCOUNTER
No new care gaps identified.  Powered by [email or web address]. Reference number: [id number].   4/29/2022 11:02:37 AM CDT

## 2022-05-02 ENCOUNTER — TELEPHONE (OUTPATIENT)
Dept: CARDIOLOGY | Facility: CLINIC | Age: 77
End: 2022-05-02
Payer: MEDICARE

## 2022-05-02 ENCOUNTER — PATIENT MESSAGE (OUTPATIENT)
Dept: CARDIOLOGY | Facility: CLINIC | Age: 77
End: 2022-05-02
Payer: MEDICARE

## 2022-05-02 LAB
OHS CV HOLTER SINUS AVERAGE HR: 60
OHS CV HOLTER SINUS MAX HR: 102
OHS CV HOLTER SINUS MIN HR: 42

## 2022-05-03 NOTE — TELEPHONE ENCOUNTER
----- Message from Joe Bullard sent at 5/2/2022 11:34 AM CDT -----  Contact: self  Renee Arteaga would like  call back at [phone], in regards to her heart monitor.     
----- Message from Joe Bullard sent at 5/2/2022 11:34 AM CDT -----  Contact: self  Renee Arteaga would like  call back at [phone], in regards to her heart monitor.     
I spoke to the EKG tech, the device should still be in the mail. We will follow up in 1 week.   
Pt notified through pt portal.   
no

## 2022-05-11 DIAGNOSIS — L29.9 ITCHING: ICD-10-CM

## 2022-05-11 DIAGNOSIS — F41.9 ANXIETY: ICD-10-CM

## 2022-05-11 RX ORDER — HYDROXYZINE HYDROCHLORIDE 50 MG/1
TABLET, FILM COATED ORAL
Qty: 60 TABLET | Refills: 0 | Status: SHIPPED | OUTPATIENT
Start: 2022-05-11 | End: 2022-06-10 | Stop reason: SDUPTHER

## 2022-05-11 RX ORDER — ALPRAZOLAM 0.5 MG/1
0.5 TABLET ORAL 3 TIMES DAILY
Qty: 90 TABLET | Refills: 0 | Status: CANCELLED | OUTPATIENT
Start: 2022-05-11

## 2022-05-11 NOTE — TELEPHONE ENCOUNTER
No new care gaps identified.  [facility] Embedded Care Gaps. Reference number: [id number]. 5/11/2022   12:55:43 PM CDT

## 2022-05-12 ENCOUNTER — TELEPHONE (OUTPATIENT)
Dept: INTERNAL MEDICINE | Facility: CLINIC | Age: 77
End: 2022-05-12
Payer: MEDICARE

## 2022-05-12 NOTE — TELEPHONE ENCOUNTER
----- Message from Malena Gomez sent at 5/12/2022 10:29 AM CDT -----  Contact: Renee Duran called regarding her ALPRAZolam (XANAX) 0.5 MG tablet has been denied and she doesn't know why, please give her a call back at [phone] to let her know if it she can get it        [facility] - [place], [place] - [address]   [address] [facility] [address]  Phone: [phone] Fax: [phone]    Thanks  Kb

## 2022-06-10 ENCOUNTER — OFFICE VISIT (OUTPATIENT)
Dept: INTERNAL MEDICINE | Facility: CLINIC | Age: 77
End: 2022-06-10
Payer: MEDICARE

## 2022-06-10 VITALS
DIASTOLIC BLOOD PRESSURE: 74 MMHG | HEIGHT: 66 IN | RESPIRATION RATE: 18 BRPM | SYSTOLIC BLOOD PRESSURE: 128 MMHG | BODY MASS INDEX: 23.7 KG/M2 | WEIGHT: 147.5 LBS

## 2022-06-10 DIAGNOSIS — Z78.0 POSTMENOPAUSAL: ICD-10-CM

## 2022-06-10 DIAGNOSIS — L29.9 ITCHING: Primary | ICD-10-CM

## 2022-06-10 DIAGNOSIS — F41.9 ANXIETY: ICD-10-CM

## 2022-06-10 PROCEDURE — 99213 OFFICE O/P EST LOW 20 MIN: CPT | Mod: S$GLB,,, | Performed by: FAMILY MEDICINE

## 2022-06-10 PROCEDURE — 1126F PR PAIN SEVERITY QUANTIFIED, NO PAIN PRESENT: ICD-10-PCS | Mod: CPTII,S$GLB,, | Performed by: FAMILY MEDICINE

## 2022-06-10 PROCEDURE — 1101F PR PT FALLS ASSESS DOC 0-1 FALLS W/OUT INJ PAST YR: ICD-10-PCS | Mod: CPTII,S$GLB,, | Performed by: FAMILY MEDICINE

## 2022-06-10 PROCEDURE — 1159F MED LIST DOCD IN RCRD: CPT | Mod: CPTII,S$GLB,, | Performed by: FAMILY MEDICINE

## 2022-06-10 PROCEDURE — 3078F DIAST BP <80 MM HG: CPT | Mod: CPTII,S$GLB,, | Performed by: FAMILY MEDICINE

## 2022-06-10 PROCEDURE — 1159F PR MEDICATION LIST DOCUMENTED IN MEDICAL RECORD: ICD-10-PCS | Mod: CPTII,S$GLB,, | Performed by: FAMILY MEDICINE

## 2022-06-10 PROCEDURE — 99213 PR OFFICE/OUTPT VISIT, EST, LEVL III, 20-29 MIN: ICD-10-PCS | Mod: S$GLB,,, | Performed by: FAMILY MEDICINE

## 2022-06-10 PROCEDURE — 99999 PR PBB SHADOW E&M-EST. PATIENT-LVL IV: ICD-10-PCS | Mod: PBBFAC,,, | Performed by: FAMILY MEDICINE

## 2022-06-10 PROCEDURE — 3074F PR MOST RECENT SYSTOLIC BLOOD PRESSURE < 130 MM HG: ICD-10-PCS | Mod: CPTII,S$GLB,, | Performed by: FAMILY MEDICINE

## 2022-06-10 PROCEDURE — 1126F AMNT PAIN NOTED NONE PRSNT: CPT | Mod: CPTII,S$GLB,, | Performed by: FAMILY MEDICINE

## 2022-06-10 PROCEDURE — 99999 PR PBB SHADOW E&M-EST. PATIENT-LVL IV: CPT | Mod: PBBFAC,,, | Performed by: FAMILY MEDICINE

## 2022-06-10 PROCEDURE — 1101F PT FALLS ASSESS-DOCD LE1/YR: CPT | Mod: CPTII,S$GLB,, | Performed by: FAMILY MEDICINE

## 2022-06-10 PROCEDURE — 3074F SYST BP LT 130 MM HG: CPT | Mod: CPTII,S$GLB,, | Performed by: FAMILY MEDICINE

## 2022-06-10 PROCEDURE — 3288F FALL RISK ASSESSMENT DOCD: CPT | Mod: CPTII,S$GLB,, | Performed by: FAMILY MEDICINE

## 2022-06-10 PROCEDURE — 3078F PR MOST RECENT DIASTOLIC BLOOD PRESSURE < 80 MM HG: ICD-10-PCS | Mod: CPTII,S$GLB,, | Performed by: FAMILY MEDICINE

## 2022-06-10 PROCEDURE — 3288F PR FALLS RISK ASSESSMENT DOCUMENTED: ICD-10-PCS | Mod: CPTII,S$GLB,, | Performed by: FAMILY MEDICINE

## 2022-06-10 RX ORDER — HYDROXYZINE HYDROCHLORIDE 50 MG/1
TABLET, FILM COATED ORAL
Qty: 60 TABLET | Refills: 2 | Status: SHIPPED | OUTPATIENT
Start: 2022-06-10 | End: 2022-09-06 | Stop reason: SDUPTHER

## 2022-06-10 RX ORDER — METHYLPREDNISOLONE 4 MG/1
TABLET ORAL
Qty: 1 EACH | Refills: 0 | Status: SHIPPED | OUTPATIENT
Start: 2022-06-10 | End: 2022-07-01

## 2022-06-10 RX ORDER — ALPRAZOLAM 0.5 MG/1
0.5 TABLET ORAL 3 TIMES DAILY
Qty: 90 TABLET | Refills: 0 | Status: SHIPPED | OUTPATIENT
Start: 2022-06-10 | End: 2022-07-06 | Stop reason: SDUPTHER

## 2022-07-08 DIAGNOSIS — M16.12 ARTHRITIS OF LEFT HIP: ICD-10-CM

## 2022-07-08 DIAGNOSIS — M16.11 ARTHRITIS OF RIGHT HIP: ICD-10-CM

## 2022-07-08 DIAGNOSIS — F41.9 ANXIETY: ICD-10-CM

## 2022-07-08 DIAGNOSIS — M47.817 LUMBOSACRAL SPONDYLOSIS WITHOUT MYELOPATHY: ICD-10-CM

## 2022-07-08 RX ORDER — MELOXICAM 15 MG/1
15 TABLET ORAL DAILY
Qty: 90 TABLET | Refills: 0 | Status: SHIPPED | OUTPATIENT
Start: 2022-07-08 | End: 2022-09-06 | Stop reason: SDUPTHER

## 2022-07-08 RX ORDER — ALPRAZOLAM 0.5 MG/1
0.5 TABLET ORAL 3 TIMES DAILY
Qty: 90 TABLET | Refills: 0 | Status: SHIPPED | OUTPATIENT
Start: 2022-07-08 | End: 2022-08-03 | Stop reason: SDUPTHER

## 2022-07-08 NOTE — TELEPHONE ENCOUNTER
No new care gaps identified.  [facility] Embedded Care Gaps. Reference number: [id number]. 7/08/2022   12:19:46 PM CDT

## 2022-07-26 ENCOUNTER — LAB VISIT (OUTPATIENT)
Dept: LAB | Facility: HOSPITAL | Age: 77
End: 2022-07-26
Attending: FAMILY MEDICINE
Payer: MEDICARE

## 2022-07-26 DIAGNOSIS — R80.9 PROTEINURIA: ICD-10-CM

## 2022-07-26 DIAGNOSIS — N18.9 CHRONIC KIDNEY DISEASE, UNSPECIFIED: Primary | ICD-10-CM

## 2022-07-26 LAB
ALBUMIN SERPL BCP-MCNC: 3.9 G/DL (ref 3.5–5.2)
ANION GAP SERPL CALC-SCNC: 9 MMOL/L (ref 8–16)
BUN SERPL-MCNC: 16 MG/DL (ref 8–23)
CALCIUM SERPL-MCNC: 9.4 MG/DL (ref 8.7–10.5)
CHLORIDE SERPL-SCNC: 93 MMOL/L (ref 95–110)
CO2 SERPL-SCNC: 25 MMOL/L (ref 23–29)
CREAT SERPL-MCNC: 0.8 MG/DL (ref 0.5–1.4)
EST. GFR  (AFRICAN AMERICAN): >60 ML/MIN/1.73 M^2
EST. GFR  (NON AFRICAN AMERICAN): >60 ML/MIN/1.73 M^2
GLUCOSE SERPL-MCNC: 97 MG/DL (ref 70–110)
PHOSPHATE SERPL-MCNC: 4.4 MG/DL (ref 2.7–4.5)
POTASSIUM SERPL-SCNC: 5.1 MMOL/L (ref 3.5–5.1)
SODIUM SERPL-SCNC: 127 MMOL/L (ref 136–145)

## 2022-07-26 PROCEDURE — 36415 COLL VENOUS BLD VENIPUNCTURE: CPT | Performed by: INTERNAL MEDICINE

## 2022-07-26 PROCEDURE — 80069 RENAL FUNCTION PANEL: CPT | Performed by: INTERNAL MEDICINE

## 2022-09-07 ENCOUNTER — LAB VISIT (OUTPATIENT)
Dept: LAB | Facility: HOSPITAL | Age: 77
End: 2022-09-07
Attending: STUDENT IN AN ORGANIZED HEALTH CARE EDUCATION/TRAINING PROGRAM
Payer: MEDICARE

## 2022-09-07 DIAGNOSIS — E87.1 HYPOSMOLALITY SYNDROME: Primary | ICD-10-CM

## 2022-09-07 LAB
CHLORIDE UR-SCNC: 47 MMOL/L (ref 25–200)
SODIUM UR-SCNC: 47 MMOL/L (ref 20–250)
URATE SERPL-MCNC: 3.5 MG/DL (ref 2.4–5.7)

## 2022-09-07 PROCEDURE — 83935 ASSAY OF URINE OSMOLALITY: CPT

## 2022-09-07 PROCEDURE — 82436 ASSAY OF URINE CHLORIDE: CPT

## 2022-09-07 PROCEDURE — 84550 ASSAY OF BLOOD/URIC ACID: CPT

## 2022-09-07 PROCEDURE — 84300 ASSAY OF URINE SODIUM: CPT

## 2022-09-07 PROCEDURE — 36415 COLL VENOUS BLD VENIPUNCTURE: CPT

## 2022-09-08 LAB — OSMOLALITY UR: 329 MOSM/KG (ref 50–1200)

## 2022-11-23 ENCOUNTER — TELEPHONE (OUTPATIENT)
Dept: INTERNAL MEDICINE | Facility: CLINIC | Age: 77
End: 2022-11-23
Payer: MEDICARE

## 2022-12-01 ENCOUNTER — OFFICE VISIT (OUTPATIENT)
Dept: URGENT CARE | Facility: CLINIC | Age: 77
End: 2022-12-01
Payer: MEDICARE

## 2022-12-01 VITALS
HEIGHT: 66 IN | DIASTOLIC BLOOD PRESSURE: 74 MMHG | TEMPERATURE: 98 F | HEART RATE: 94 BPM | OXYGEN SATURATION: 97 % | WEIGHT: 147 LBS | SYSTOLIC BLOOD PRESSURE: 126 MMHG | BODY MASS INDEX: 23.63 KG/M2 | RESPIRATION RATE: 20 BRPM

## 2022-12-01 DIAGNOSIS — J40 BRONCHITIS: Primary | ICD-10-CM

## 2022-12-01 PROBLEM — N28.9 RENAL INSUFFICIENCY: Status: ACTIVE | Noted: 2022-03-31

## 2022-12-01 PROBLEM — I10 BENIGN ESSENTIAL HYPERTENSION: Status: ACTIVE | Noted: 2022-06-14

## 2022-12-01 PROBLEM — E87.1 HYPO-OSMOLALITY AND HYPONATREMIA: Status: ACTIVE | Noted: 2022-07-28

## 2022-12-01 PROCEDURE — 99213 PR OFFICE/OUTPT VISIT, EST, LEVL III, 20-29 MIN: ICD-10-PCS | Mod: S$GLB,,, | Performed by: PHYSICIAN ASSISTANT

## 2022-12-01 PROCEDURE — 3074F PR MOST RECENT SYSTOLIC BLOOD PRESSURE < 130 MM HG: ICD-10-PCS | Mod: CPTII,S$GLB,, | Performed by: PHYSICIAN ASSISTANT

## 2022-12-01 PROCEDURE — 1159F PR MEDICATION LIST DOCUMENTED IN MEDICAL RECORD: ICD-10-PCS | Mod: CPTII,S$GLB,, | Performed by: PHYSICIAN ASSISTANT

## 2022-12-01 PROCEDURE — 99213 OFFICE O/P EST LOW 20 MIN: CPT | Mod: S$GLB,,, | Performed by: PHYSICIAN ASSISTANT

## 2022-12-01 PROCEDURE — 1125F AMNT PAIN NOTED PAIN PRSNT: CPT | Mod: CPTII,S$GLB,, | Performed by: PHYSICIAN ASSISTANT

## 2022-12-01 PROCEDURE — 3078F DIAST BP <80 MM HG: CPT | Mod: CPTII,S$GLB,, | Performed by: PHYSICIAN ASSISTANT

## 2022-12-01 PROCEDURE — 1159F MED LIST DOCD IN RCRD: CPT | Mod: CPTII,S$GLB,, | Performed by: PHYSICIAN ASSISTANT

## 2022-12-01 PROCEDURE — 3078F PR MOST RECENT DIASTOLIC BLOOD PRESSURE < 80 MM HG: ICD-10-PCS | Mod: CPTII,S$GLB,, | Performed by: PHYSICIAN ASSISTANT

## 2022-12-01 PROCEDURE — 1160F RVW MEDS BY RX/DR IN RCRD: CPT | Mod: CPTII,S$GLB,, | Performed by: PHYSICIAN ASSISTANT

## 2022-12-01 PROCEDURE — 1125F PR PAIN SEVERITY QUANTIFIED, PAIN PRESENT: ICD-10-PCS | Mod: CPTII,S$GLB,, | Performed by: PHYSICIAN ASSISTANT

## 2022-12-01 PROCEDURE — 3074F SYST BP LT 130 MM HG: CPT | Mod: CPTII,S$GLB,, | Performed by: PHYSICIAN ASSISTANT

## 2022-12-01 PROCEDURE — 1160F PR REVIEW ALL MEDS BY PRESCRIBER/CLIN PHARMACIST DOCUMENTED: ICD-10-PCS | Mod: CPTII,S$GLB,, | Performed by: PHYSICIAN ASSISTANT

## 2022-12-01 RX ORDER — AZITHROMYCIN 250 MG/1
TABLET, FILM COATED ORAL
Qty: 6 TABLET | Refills: 0 | Status: SHIPPED | OUTPATIENT
Start: 2022-12-01 | End: 2022-12-06

## 2022-12-01 RX ORDER — TRIAMCINOLONE ACETONIDE 1 MG/G
CREAM TOPICAL
COMMUNITY
Start: 2022-11-15 | End: 2023-10-30

## 2022-12-01 RX ORDER — GUAIFENESIN 600 MG/1
1200 TABLET, EXTENDED RELEASE ORAL 2 TIMES DAILY
Qty: 20 TABLET | Refills: 0 | Status: SHIPPED | OUTPATIENT
Start: 2022-12-01 | End: 2022-12-06

## 2022-12-01 RX ORDER — ALBUTEROL SULFATE 90 UG/1
2 AEROSOL, METERED RESPIRATORY (INHALATION) EVERY 6 HOURS PRN
Qty: 18 G | Refills: 0 | Status: SHIPPED | OUTPATIENT
Start: 2022-12-01 | End: 2023-10-30

## 2022-12-01 NOTE — PROGRESS NOTES
"Subjective:       Patient ID: Renee Arteaga is a 77 y.o. female.    Vitals:  height is 5' 6" (1.676 m) and weight is 66.7 kg (147 lb). Her temperature is 97.5 °F (36.4 °C). Her blood pressure is 126/74 and her pulse is 94. Her respiration is 20 and oxygen saturation is 97%.     Chief Complaint: Cough    Patient is a 77 year old female who presents to clinic with complaint of hoarse productive cough with white/clear sputum .  Patient states her symptoms began approximately 7-8 days ago.  Patient states her symptoms are improving that they have not gone away.  Patient states she is not had any fevers or chills throughout.  Patient denies any chest pain or shortness of breath.  Patient states she is taking over-the-counter medications without relief of symptoms.  Patient denies any history of asthma or bronchitis.  Patient denies any known sick contacts.    Cough  This is a new problem. The current episode started 1 to 4 weeks ago. The problem has been unchanged. The problem occurs constantly. The cough is Productive of sputum. Associated symptoms include postnasal drip. Pertinent negatives include no chest pain, chills, ear congestion, ear pain, fever, headaches, heartburn, hemoptysis, myalgias, nasal congestion, rash, rhinorrhea, sore throat, shortness of breath, sweats, weight loss or wheezing. Nothing aggravates the symptoms. Treatments tried: delsym verna seltzer cold and flu. The treatment provided mild relief. There is no history of asthma, bronchiectasis, bronchitis, COPD, emphysema, environmental allergies or pneumonia.     Constitution: Negative for chills and fever.   HENT:  Positive for congestion and postnasal drip. Negative for ear pain, sinus pain, sinus pressure, sore throat and trouble swallowing.    Neck: Negative for painful lymph nodes.   Cardiovascular:  Negative for chest pain.   Respiratory:  Positive for cough and sputum production. Negative for bloody sputum, shortness of breath and wheezing.  "   Gastrointestinal:  Negative for abdominal pain, nausea, vomiting, diarrhea and heartburn.   Musculoskeletal:  Negative for muscle ache.   Skin:  Negative for rash.   Allergic/Immunologic: Negative for environmental allergies.   Neurological:  Negative for headaches.   Hematologic/Lymphatic: Negative for swollen lymph nodes.     Objective:      Physical Exam   Constitutional: She is oriented to person, place, and time. She appears well-developed. She is cooperative.  Non-toxic appearance. She does not appear ill. No distress.   HENT:   Head: Normocephalic and atraumatic.   Ears:   Right Ear: Hearing, tympanic membrane, external ear and ear canal normal.   Left Ear: Hearing, tympanic membrane, external ear and ear canal normal.   Nose: Congestion present. No mucosal edema, rhinorrhea or nasal deformity. No epistaxis. Right sinus exhibits no maxillary sinus tenderness and no frontal sinus tenderness. Left sinus exhibits no maxillary sinus tenderness and no frontal sinus tenderness.   Mouth/Throat: Uvula is midline and mucous membranes are normal. No trismus in the jaw. Normal dentition. No uvula swelling. Posterior oropharyngeal erythema present. No oropharyngeal exudate or posterior oropharyngeal edema.   Eyes: Conjunctivae and lids are normal. No scleral icterus.   Neck: Trachea normal and phonation normal. Neck supple. No edema present. No erythema present. No neck rigidity present.   Cardiovascular: Normal rate, regular rhythm, normal heart sounds and normal pulses.   No murmur heard.  Pulmonary/Chest: Effort normal and breath sounds normal. No respiratory distress. She has no decreased breath sounds. She has no wheezes. She has no rhonchi.   Abdominal: Normal appearance.   Musculoskeletal: Normal range of motion.         General: No deformity. Normal range of motion.   Lymphadenopathy:     She has no cervical adenopathy.   Neurological: She is alert and oriented to person, place, and time. She exhibits normal  muscle tone. Coordination normal.   Skin: Skin is warm, dry, intact, not diaphoretic and not pale.   Psychiatric: Her speech is normal and behavior is normal. Judgment and thought content normal.   Nursing note and vitals reviewed.      Assessment:       1. Bronchitis          Plan:         Bronchitis  -     azithromycin (Z-DENISE) 250 MG tablet; Take 2 tablets by mouth on day 1; Take 1 tablet by mouth on days 2-5  Dispense: 6 tablet; Refill: 0  -     guaiFENesin (MUCINEX) 600 mg 12 hr tablet; Take 2 tablets (1,200 mg total) by mouth 2 (two) times daily. for 5 days  Dispense: 20 tablet; Refill: 0  -     albuterol (PROVENTIL HFA) 90 mcg/actuation inhaler; Inhale 2 puffs into the lungs every 6 (six) hours as needed for Wheezing. Rescue  Dispense: 18 g; Refill: 0       Discussed use of OTC medications for symptom control as well as prescribed medications for symptom treatment.   All ER precautions covered including but not limited to shortness of breath, intractable fever, or chest pain.  Discussed RTC if symptoms worsen, change, or persist.     Patient verbalized understanding and agreed with the plan.     Camryn Marques PA-C    Patient Instructions   PLEASE READ YOUR DISCHARGE INSTRUCTIONS ENTIRELY AS IT CONTAINS IMPORTANT INFORMATION.      Please drink plenty of fluids.    Please get plenty of rest.    Please return here or go to the Emergency Department for any concerns or worsening of condition.    Please take an over the counter antihistamine medication (allegra/Claritin/Zyrtec) of your choice as directed.    Try an over the counter decongestant like Mucinex D or Sudafed. You buy this behind the pharmacy counter    If you do have Hypertension or palpitations, it is safe to take Coricidin HBP for relief of sinus symptoms.    If not allergic, please take over the counter Tylenol (Acetaminophen) and/or Motrin (Ibuprofen) as directed for control of pain and/or fever.  Please follow up with your primary care doctor or  specialist as needed.    Sore throat recommendations: Warm fluids, warm salt water gargles, throat lozenges, tea, honey, soup, rest, hydration.    Use over the counter flonase: one spray each nostril twice daily OR two sprays each nostril once daily.     Sinus rinses DO NOT USE TAP WATER, if you must, water must be a rolling boil for 1 minute, let it cool, then use.  May use distilled water, or over the counter nasal saline rinses.  Vics vapor rub in shower to help open nasal passages.  May use nasal gel to keep passages moisturized.  May use Nasal saline sprays during the day for added relief of congestion.   For those who go to the gym, please do not use the sauna or steam room now to clear sinuses.    If you  smoke, please stop smoking.      Please return or see your primary care doctor if you develop new or worsening symptoms.     Please arrange follow up with your primary medical clinic as soon as possible. You must understand that you've received an Urgent Care treatment only and that you may be released before all of your medical problems are known or treated. You, the patient, will arrange for follow up as instructed. If your symptoms worsen or fail to improve you should go to the Emergency Room.

## 2022-12-22 ENCOUNTER — TELEPHONE (OUTPATIENT)
Dept: ADMINISTRATIVE | Facility: HOSPITAL | Age: 77
End: 2022-12-22
Payer: MEDICARE

## 2023-02-05 ENCOUNTER — OFFICE VISIT (OUTPATIENT)
Dept: URGENT CARE | Facility: CLINIC | Age: 78
End: 2023-02-05
Payer: MEDICARE

## 2023-02-05 VITALS
WEIGHT: 147 LBS | TEMPERATURE: 98 F | OXYGEN SATURATION: 100 % | HEART RATE: 73 BPM | RESPIRATION RATE: 18 BRPM | HEIGHT: 66 IN | SYSTOLIC BLOOD PRESSURE: 112 MMHG | BODY MASS INDEX: 23.63 KG/M2 | DIASTOLIC BLOOD PRESSURE: 68 MMHG

## 2023-02-05 DIAGNOSIS — J20.9 ACUTE BRONCHITIS, UNSPECIFIED ORGANISM: Primary | ICD-10-CM

## 2023-02-05 DIAGNOSIS — R05.9 COUGH, UNSPECIFIED TYPE: ICD-10-CM

## 2023-02-05 LAB
CTP QC/QA: YES
SARS-COV-2 RDRP RESP QL NAA+PROBE: NEGATIVE

## 2023-02-05 PROCEDURE — 3078F DIAST BP <80 MM HG: CPT | Mod: CPTII,S$GLB,, | Performed by: NURSE PRACTITIONER

## 2023-02-05 PROCEDURE — 99213 OFFICE O/P EST LOW 20 MIN: CPT | Mod: 25,S$GLB,, | Performed by: NURSE PRACTITIONER

## 2023-02-05 PROCEDURE — 1160F RVW MEDS BY RX/DR IN RCRD: CPT | Mod: CPTII,S$GLB,, | Performed by: NURSE PRACTITIONER

## 2023-02-05 PROCEDURE — 1159F MED LIST DOCD IN RCRD: CPT | Mod: CPTII,S$GLB,, | Performed by: NURSE PRACTITIONER

## 2023-02-05 PROCEDURE — 1160F PR REVIEW ALL MEDS BY PRESCRIBER/CLIN PHARMACIST DOCUMENTED: ICD-10-PCS | Mod: CPTII,S$GLB,, | Performed by: NURSE PRACTITIONER

## 2023-02-05 PROCEDURE — 99213 PR OFFICE/OUTPT VISIT, EST, LEVL III, 20-29 MIN: ICD-10-PCS | Mod: 25,S$GLB,, | Performed by: NURSE PRACTITIONER

## 2023-02-05 PROCEDURE — 1159F PR MEDICATION LIST DOCUMENTED IN MEDICAL RECORD: ICD-10-PCS | Mod: CPTII,S$GLB,, | Performed by: NURSE PRACTITIONER

## 2023-02-05 PROCEDURE — 3078F PR MOST RECENT DIASTOLIC BLOOD PRESSURE < 80 MM HG: ICD-10-PCS | Mod: CPTII,S$GLB,, | Performed by: NURSE PRACTITIONER

## 2023-02-05 PROCEDURE — 3074F PR MOST RECENT SYSTOLIC BLOOD PRESSURE < 130 MM HG: ICD-10-PCS | Mod: CPTII,S$GLB,, | Performed by: NURSE PRACTITIONER

## 2023-02-05 PROCEDURE — 87635 SARS-COV-2 COVID-19 AMP PRB: CPT | Mod: QW,S$GLB,, | Performed by: NURSE PRACTITIONER

## 2023-02-05 PROCEDURE — 3074F SYST BP LT 130 MM HG: CPT | Mod: CPTII,S$GLB,, | Performed by: NURSE PRACTITIONER

## 2023-02-05 PROCEDURE — 94640 PR INHAL RX, AIRWAY OBST/DX SPUTUM INDUCT: ICD-10-PCS | Mod: S$GLB,,, | Performed by: NURSE PRACTITIONER

## 2023-02-05 PROCEDURE — 94640 AIRWAY INHALATION TREATMENT: CPT | Mod: S$GLB,,, | Performed by: NURSE PRACTITIONER

## 2023-02-05 PROCEDURE — 87635: ICD-10-PCS | Mod: QW,S$GLB,, | Performed by: NURSE PRACTITIONER

## 2023-02-05 RX ORDER — IPRATROPIUM BROMIDE 0.5 MG/2.5ML
0.5 SOLUTION RESPIRATORY (INHALATION)
Status: COMPLETED | OUTPATIENT
Start: 2023-02-05 | End: 2023-02-05

## 2023-02-05 RX ORDER — PROMETHAZINE HYDROCHLORIDE AND DEXTROMETHORPHAN HYDROBROMIDE 6.25; 15 MG/5ML; MG/5ML
5 SYRUP ORAL EVERY 4 HOURS PRN
Qty: 118 ML | Refills: 0 | Status: SHIPPED | OUTPATIENT
Start: 2023-02-05 | End: 2023-02-15

## 2023-02-05 RX ORDER — ALBUTEROL SULFATE 0.83 MG/ML
2.5 SOLUTION RESPIRATORY (INHALATION)
Status: COMPLETED | OUTPATIENT
Start: 2023-02-05 | End: 2023-02-05

## 2023-02-05 RX ADMIN — IPRATROPIUM BROMIDE 0.5 MG: 0.5 SOLUTION RESPIRATORY (INHALATION) at 02:02

## 2023-02-05 RX ADMIN — ALBUTEROL SULFATE 2.5 MG: 0.83 SOLUTION RESPIRATORY (INHALATION) at 02:02

## 2023-02-05 NOTE — PROGRESS NOTES
"Subjective:       Patient ID: Renee Arteaga is a 77 y.o. female.    Vitals:  height is 5' 6" (1.676 m) and weight is 66.7 kg (147 lb). Her oral temperature is 98.1 °F (36.7 °C). Her blood pressure is 112/68 and her pulse is 73. Her respiration is 18 and oxygen saturation is 100%.     Chief Complaint: Cough    Pt presents today with cough and congestion x's 1 week. Pt is taking albuterol, delsym and robitussin with no relief. Pt was seen in January for same symptoms and prescribed an antibiotic. Pt has no known flu, strep or covid exposure.      States tessalon perles does not help   Wants cough liquid other than perles    Cough  This is a new problem. The current episode started in the past 7 days. The problem has been unchanged. The problem occurs constantly. The cough is Non-productive. Associated symptoms include nasal congestion, postnasal drip and a sore throat. Pertinent negatives include no chills, fever, myalgias, shortness of breath or wheezing. Nothing aggravates the symptoms. She has tried nothing for the symptoms. The treatment provided no relief.     Constitution: Negative for chills, sweating, fatigue and fever.   HENT:  Positive for postnasal drip and sore throat. Negative for congestion.    Cardiovascular:  Negative for sob on exertion.   Respiratory:  Positive for chest tightness and cough. Negative for shortness of breath and wheezing.    Gastrointestinal:  Negative for nausea, vomiting and diarrhea.   Musculoskeletal:  Negative for muscle ache.     Objective:      Physical Exam   Constitutional: She appears well-developed. She is cooperative.  Non-toxic appearance. She does not appear ill. No distress. awake  HENT:   Head: Normocephalic and atraumatic.   Ears:   Right Ear: Tympanic membrane, external ear and ear canal normal.   Left Ear: Tympanic membrane, external ear and ear canal normal.   Nose: Nose normal. No rhinorrhea or congestion.   Mouth/Throat: No oropharyngeal exudate or posterior " oropharyngeal erythema.   Eyes: Conjunctivae and EOM are normal.   Neck: Neck supple.   Cardiovascular: Normal rate and regular rhythm.   Pulmonary/Chest: Effort normal and breath sounds normal. No stridor. No respiratory distress. She has no wheezes. She has no rhonchi. She has no rales.   Abdominal: Normal appearance.   Musculoskeletal: Normal range of motion.         General: Normal range of motion.   Neurological: no focal deficit. She is alert. She displays no weakness. Gait normal.   Skin: Skin is warm, dry, not diaphoretic, not pale and no rash.   Psychiatric: Her behavior is normal.   Nursing note and vitals reviewed.      Results for orders placed or performed in visit on 02/05/23   POCT COVID-19 Rapid Screening   Result Value Ref Range    POC Rapid COVID Negative Negative     Acceptable Yes        Assessment:       1. Acute bronchitis, unspecified organism    2. Cough, unspecified type          Plan:         Acute bronchitis, unspecified organism  -     albuterol nebulizer solution 2.5 mg  -     ipratropium 0.02 % nebulizer solution 0.5 mg  -     promethazine-dextromethorphan (PROMETHAZINE-DM) 6.25-15 mg/5 mL Syrp; Take 5 mLs by mouth every 4 (four) hours as needed (cough).  Dispense: 118 mL; Refill: 0    Cough, unspecified type  -     POCT COVID-19 Rapid Screening  -     albuterol nebulizer solution 2.5 mg  -     ipratropium 0.02 % nebulizer solution 0.5 mg  -     promethazine-dextromethorphan (PROMETHAZINE-DM) 6.25-15 mg/5 mL Syrp; Take 5 mLs by mouth every 4 (four) hours as needed (cough).  Dispense: 118 mL; Refill: 0         Use inhaler every 4 hours as needed for coughing, wheezing, or SOB  I have reviewed the patient's previous chart visits and labs to look for any trends.   Follow with PCP or RTC if symptoms worsen or persist  Patient agreed with plan of care and verbalized understanding       Patient Instructions   Please follow up with your Primary care provider within 2-5 days if  your signs and symptoms have not resolved or worsen.     If your condition worsens or fails to improve we recommend that you receive another evaluation at the emergency room immediately or contact your primary medical clinic to discuss your concerns.   You must understand that you have received an Urgent Care treatment only and that you may be released before all of your medical problems are known or treated. You, the patient, will arrange for follow up care as instructed.     RED FLAGS/WARNING SYMPTOMS DISCUSSED WITH PATIENT THAT WOULD WARRANT EMERGENT MEDICAL ATTENTION. PATIENT VERBALIZED UNDERSTANDING.

## 2023-02-17 ENCOUNTER — OFFICE VISIT (OUTPATIENT)
Dept: INTERNAL MEDICINE | Facility: CLINIC | Age: 78
End: 2023-02-17
Payer: MEDICARE

## 2023-02-17 VITALS
HEART RATE: 70 BPM | HEIGHT: 66 IN | DIASTOLIC BLOOD PRESSURE: 70 MMHG | SYSTOLIC BLOOD PRESSURE: 124 MMHG | BODY MASS INDEX: 24.5 KG/M2 | TEMPERATURE: 98 F | RESPIRATION RATE: 16 BRPM | WEIGHT: 152.44 LBS

## 2023-02-17 DIAGNOSIS — H26.9 CATARACT, UNSPECIFIED CATARACT TYPE, UNSPECIFIED LATERALITY: ICD-10-CM

## 2023-02-17 DIAGNOSIS — Z01.818 PRE-OPERATIVE CLEARANCE: Primary | ICD-10-CM

## 2023-02-17 PROCEDURE — 1160F PR REVIEW ALL MEDS BY PRESCRIBER/CLIN PHARMACIST DOCUMENTED: ICD-10-PCS | Mod: CPTII,S$GLB,, | Performed by: NURSE PRACTITIONER

## 2023-02-17 PROCEDURE — 1126F AMNT PAIN NOTED NONE PRSNT: CPT | Mod: CPTII,S$GLB,, | Performed by: NURSE PRACTITIONER

## 2023-02-17 PROCEDURE — 3074F PR MOST RECENT SYSTOLIC BLOOD PRESSURE < 130 MM HG: ICD-10-PCS | Mod: CPTII,S$GLB,, | Performed by: NURSE PRACTITIONER

## 2023-02-17 PROCEDURE — 1101F PT FALLS ASSESS-DOCD LE1/YR: CPT | Mod: CPTII,S$GLB,, | Performed by: NURSE PRACTITIONER

## 2023-02-17 PROCEDURE — 1126F PR PAIN SEVERITY QUANTIFIED, NO PAIN PRESENT: ICD-10-PCS | Mod: CPTII,S$GLB,, | Performed by: NURSE PRACTITIONER

## 2023-02-17 PROCEDURE — 99999 PR PBB SHADOW E&M-EST. PATIENT-LVL IV: CPT | Mod: PBBFAC,,, | Performed by: NURSE PRACTITIONER

## 2023-02-17 PROCEDURE — 3078F PR MOST RECENT DIASTOLIC BLOOD PRESSURE < 80 MM HG: ICD-10-PCS | Mod: CPTII,S$GLB,, | Performed by: NURSE PRACTITIONER

## 2023-02-17 PROCEDURE — 1101F PR PT FALLS ASSESS DOC 0-1 FALLS W/OUT INJ PAST YR: ICD-10-PCS | Mod: CPTII,S$GLB,, | Performed by: NURSE PRACTITIONER

## 2023-02-17 PROCEDURE — 1159F PR MEDICATION LIST DOCUMENTED IN MEDICAL RECORD: ICD-10-PCS | Mod: CPTII,S$GLB,, | Performed by: NURSE PRACTITIONER

## 2023-02-17 PROCEDURE — 3074F SYST BP LT 130 MM HG: CPT | Mod: CPTII,S$GLB,, | Performed by: NURSE PRACTITIONER

## 2023-02-17 PROCEDURE — 1159F MED LIST DOCD IN RCRD: CPT | Mod: CPTII,S$GLB,, | Performed by: NURSE PRACTITIONER

## 2023-02-17 PROCEDURE — 99213 PR OFFICE/OUTPT VISIT, EST, LEVL III, 20-29 MIN: ICD-10-PCS | Mod: S$GLB,,, | Performed by: NURSE PRACTITIONER

## 2023-02-17 PROCEDURE — 3288F PR FALLS RISK ASSESSMENT DOCUMENTED: ICD-10-PCS | Mod: CPTII,S$GLB,, | Performed by: NURSE PRACTITIONER

## 2023-02-17 PROCEDURE — 3078F DIAST BP <80 MM HG: CPT | Mod: CPTII,S$GLB,, | Performed by: NURSE PRACTITIONER

## 2023-02-17 PROCEDURE — 99213 OFFICE O/P EST LOW 20 MIN: CPT | Mod: S$GLB,,, | Performed by: NURSE PRACTITIONER

## 2023-02-17 PROCEDURE — 1160F RVW MEDS BY RX/DR IN RCRD: CPT | Mod: CPTII,S$GLB,, | Performed by: NURSE PRACTITIONER

## 2023-02-17 PROCEDURE — 99999 PR PBB SHADOW E&M-EST. PATIENT-LVL IV: ICD-10-PCS | Mod: PBBFAC,,, | Performed by: NURSE PRACTITIONER

## 2023-02-17 PROCEDURE — 3288F FALL RISK ASSESSMENT DOCD: CPT | Mod: CPTII,S$GLB,, | Performed by: NURSE PRACTITIONER

## 2023-02-17 NOTE — PROGRESS NOTES
Renee Arteaga  02/17/2023  [id number]    Odessa Julio MD  Patient Care Team:  Odessa Julio MD as PCP - General (Internal Medicine)  Sandra Mcginnis LPN as Care Coordinator (Internal Medicine)          Visit Type:a scheduled routine follow-up visit    Chief Complaint:  Chief Complaint   Patient presents with    Pre-op Exam       History of Present Illness:    77 year old female presents today requesting to be cleared for cataract surgery. R 2/27/23 L 3/13/23 by Dr. Zamorano. Pt denies complications with anesthesia in the past. No other complaints at this time.  States she does not take any blood thinners     History:  Past Medical History:   Diagnosis Date    Anticoagulant long-term use     Pletal/ASA    Anxiety     Arthritis     Atherosclerotic PVD with intermittent claudication 4/12/2018    Blue toe syndrome of right lower extremity 4/12/2018    Colon polyp 03/14/2015    Depression     Heart murmur     Hyperlipidemia     Scoliosis     Tachycardia      Past Surgical History:   Procedure Laterality Date    BACK SURGERY      bladder/rectum lift      BREAST BIOPSY Left 2018    benign    BREAST SURGERY      HYSTERECTOMY      OOPHORECTOMY       Family History   Problem Relation Age of Onset    Arthritis Mother     Lung disease Mother     Heart disease Father      Social History     Socioeconomic History    Marital status:    Tobacco Use    Smoking status: Never    Smokeless tobacco: Never   Substance and Sexual Activity    Alcohol use: No    Drug use: No     Patient Active Problem List   Diagnosis    Heart palpitations    Dyslipidemia    Idiopathic osteoporosis    Senile osteoporosis    Lumbosacral spondylosis without myelopathy    Arthritis of right hip    Arthritis of left hip    Atherosclerotic PVD with intermittent claudication    Abnormal mammogram of left breast    Unspecified inflammatory spondylopathy, thoracic region    Chronic systolic congestive heart failure    Gastroesophageal reflux disease     Elevated blood pressure, situational    Chronic kidney disease, stage 3a    Benign essential hypertension    Hypo-osmolality and hyponatremia    Renal insufficiency     Review of patient's allergies indicates:   Allergen Reactions    Prednisone        The following were reviewed at this visit: active problem list, medication list, allergies, family history, social history, and health maintenance.    Medications:  Current Outpatient Medications on File Prior to Visit   Medication Sig Dispense Refill    albuterol (PROVENTIL HFA) 90 mcg/actuation inhaler Inhale 2 puffs into the lungs every 6 (six) hours as needed for Wheezing. Rescue 18 g 0    ALPRAZolam (XANAX) 0.5 MG tablet Take 1 tablet (0.5 mg total) by mouth 3 (three) times daily. 90 tablet 0    ammonium lactate (LAC-HYDRIN) 12 % lotion Apply topically as needed for Dry Skin. 225 g 2    cilostazoL (PLETAL) 50 MG Tab TAKE 1 TABLET BY MOUTH TWICE A DAY. 60 tablet 0    fenofibrate (TRICOR) 54 MG tablet TAKE 1 TABLET BY MOUTH ONCE DAILY. 90 tablet 0    ferrous sulfate 134 mg (27 mg iron) Tab Take by mouth 2 (two) times daily.      fluticasone propionate (FLONASE) 50 mcg/actuation nasal spray 1 spray (50 mcg total) by Each Nostril route once daily. 16 g 1    hydrOXYzine (ATARAX) 50 MG tablet TAKE 1 TABLET BY MOUTH THREE TIMES DAILY AS NEEDED FOR ITCHING OR ANXIETY. 60 tablet 2    ipratropium (ATROVENT) 21 mcg (0.03 %) nasal spray 2 SPRAYS IN EACH NOSTRIL TWICE DAILY. (Patient not taking: Reported on 12/1/2022) 30 mL 3    meloxicam (MOBIC) 15 MG tablet Take 1 tablet (15 mg total) by mouth once daily. 90 tablet 0    metoprolol succinate (TOPROL-XL) 25 MG 24 hr tablet Take 1 tablet (25 mg total) by mouth once daily. 90 tablet 0    montelukast (SINGULAIR) 10 mg tablet Take 1 tablet (10 mg total) by mouth every evening. 90 tablet 0    pantoprazole (PROTONIX) 40 MG tablet TAKE 1 TABLET BY MOUTH ONCE DAILY. 90 tablet 3    pravastatin (PRAVACHOL) 40 MG tablet TAKE 1 TABLET  BY MOUTH ONCE DAILY. 90 tablet 0    sertraline (ZOLOFT) 100 MG tablet TAKE 2 TABLETS BY MOUTH ONCE A DAY. 180 tablet 3    triamcinolone acetonide 0.1% (KENALOG) 0.1 % cream SMARTSI Application Topical 2-3 Times Daily      [DISCONTINUED] ALPRAZolam (XANAX) 0.5 MG tablet Take 1 tablet (0.5 mg total) by mouth 3 (three) times daily. 90 tablet 0    [DISCONTINUED] aspirin (ECOTRIN) 81 MG EC tablet Take 1 tablet by mouth Daily. 1 Tablet, Delayed Release (E.C.) Oral Every day      [DISCONTINUED] hydroCHLOROthiazide (MICROZIDE) 12.5 mg capsule Take 1 capsule (12.5 mg total) by mouth once daily. (Patient not taking: Reported on 2022) 90 capsule 3    [DISCONTINUED] meloxicam (MOBIC) 15 MG tablet Take 1 tablet (15 mg total) by mouth once daily. 90 tablet 0    [DISCONTINUED] montelukast (SINGULAIR) 10 mg tablet Take 1 tablet (10 mg total) by mouth every evening. 90 tablet 0     No current facility-administered medications on file prior to visit.       Medications have been reviewed and reconciled with patient at this visit.  Barriers to medications reviewed with patient.    Adverse reactions to current medications reviewed with patient..    Over the counter medications reviewed and reconciled with patient.    Exam:  Wt Readings from Last 3 Encounters:   23 69.2 kg (152 lb 7.2 oz)   23 66.7 kg (147 lb)   22 66.7 kg (147 lb)     Temp Readings from Last 3 Encounters:   23 97.7 °F (36.5 °C) (Temporal)   23 98.1 °F (36.7 °C) (Oral)   22 97.5 °F (36.4 °C)     BP Readings from Last 3 Encounters:   23 124/70   23 112/68   22 126/74     Pulse Readings from Last 3 Encounters:   23 70   23 73   22 94     Body mass index is 24.61 kg/m².      Review of Systems   All other systems reviewed and are negative.  Physical Exam  Vitals and nursing note reviewed.   Constitutional:       Appearance: Normal appearance. She is normal weight.   HENT:      Head: Normocephalic  and atraumatic.      Right Ear: Tympanic membrane, ear canal and external ear normal.      Left Ear: Tympanic membrane, ear canal and external ear normal.      Nose: Nose normal.      Mouth/Throat:      Mouth: Mucous membranes are moist.      Pharynx: Oropharynx is clear.   Eyes:      Extraocular Movements: Extraocular movements intact.      Conjunctiva/sclera: Conjunctivae normal.      Pupils: Pupils are equal, round, and reactive to light.   Cardiovascular:      Rate and Rhythm: Normal rate and regular rhythm.      Pulses: Normal pulses.      Heart sounds: Normal heart sounds.   Pulmonary:      Effort: Pulmonary effort is normal.      Breath sounds: Normal breath sounds.   Abdominal:      General: Bowel sounds are normal.      Palpations: Abdomen is soft.   Musculoskeletal:         General: Normal range of motion.      Cervical back: Normal range of motion and neck supple.   Skin:     General: Skin is warm and dry.      Capillary Refill: Capillary refill takes less than 2 seconds.   Neurological:      General: No focal deficit present.      Mental Status: She is alert and oriented to person, place, and time.   Psychiatric:         Mood and Affect: Mood normal.         Behavior: Behavior normal.         Thought Content: Thought content normal.         Judgment: Judgment normal.       Laboratory Reviewed ({Yes)  Lab Results   Component Value Date    WBC 9.28 03/31/2022    HGB 10.6 (L) 03/31/2022    HCT 32.4 (L) 03/31/2022     03/31/2022    CHOL 253 (H) 06/16/2021    TRIG 125 06/16/2021    HDL 70 06/16/2021    ALT 5 (L) 03/31/2022    AST 13 03/31/2022     (L) 07/26/2022    K 5.1 07/26/2022    CL 93 (L) 07/26/2022    CREATININE 0.8 07/26/2022    BUN 16 07/26/2022    CO2 25 07/26/2022    TSH 1.387 07/18/2013       Renee was seen today for pre-op exam.    Diagnoses and all orders for this visit:    Pre-operative clearance    Cataract, unspecified cataract type, unspecified laterality    Pt denies previous  complications with sedation in the past. Providing both the pt and the surgeon accepts risk, ok to proceed with low risk.        Care Plan/Goals: Reviewed    Goals    None         Follow up: Follow up as needed.    After visit summary was printed and given to patient upon discharge today.  Patient goals and care plan are included in After Visit Summary.

## 2023-04-25 DIAGNOSIS — F41.9 ANXIETY: ICD-10-CM

## 2023-04-25 RX ORDER — ALPRAZOLAM 0.5 MG/1
0.5 TABLET ORAL 3 TIMES DAILY
Qty: 90 TABLET | Refills: 0 | Status: SHIPPED | OUTPATIENT
Start: 2023-04-25 | End: 2023-10-30

## 2023-04-25 NOTE — TELEPHONE ENCOUNTER
No new care gaps identified.  [facility] Embedded Care Gaps. Reference number: [id number]. 4/25/2023   8:22:18 AM CDT

## 2023-05-12 DIAGNOSIS — I73.9 PAD (PERIPHERAL ARTERY DISEASE): ICD-10-CM

## 2023-05-12 DIAGNOSIS — E78.1 HYPERTRIGLYCERIDEMIA: ICD-10-CM

## 2023-05-12 DIAGNOSIS — I75.021 BLUE TOE SYNDROME OF RIGHT LOWER EXTREMITY: ICD-10-CM

## 2023-05-12 RX ORDER — FENOFIBRATE 54 MG/1
TABLET ORAL
Qty: 90 TABLET | Refills: 0 | Status: SHIPPED | OUTPATIENT
Start: 2023-05-12

## 2023-05-12 RX ORDER — CILOSTAZOL 50 MG/1
TABLET ORAL
Qty: 60 TABLET | Refills: 0 | Status: SHIPPED | OUTPATIENT
Start: 2023-05-12 | End: 2023-06-09

## 2023-05-12 NOTE — TELEPHONE ENCOUNTER
Refill Routing Note   Medication(s) are not appropriate for processing by [facility] for the following reason(s):      Medication outside of protocol  Required labs outdated    [facility] action(s):  Defer  Route Care Due:  Appointment due  Labs due          Appointments  past 12m or future 3m with PCP    Date Provider   Last Visit   6/10/2022 Odessa Julio MD   Next Visit   Visit date not found Odessa Julio MD   ED visits in past 90 days: 0        Note composed:4:20 PM 05/12/2023

## 2023-05-12 NOTE — TELEPHONE ENCOUNTER
Care Due:                  Date            Visit Type   Department     Provider  --------------------------------------------------------------------------------                                SAME DAY -                              ESTABLISHED   [facility] INTER[facility]  Last Visit: 06-      PATIENT      MEDICINE       Odessa Julio  Next Visit: None Scheduled  None         None Found                                                            Last  Test          Frequency    Reason                     Performed    Due Date  --------------------------------------------------------------------------------    Office Visit  12 months..  fenofibrate, metoprolol,   06-   06-                             montelukast, pravastatin,                             sertraline...............    CBC.........  12 months..  fenofibrate..............  03- 03-    CMP.........  12 months..  fenofibrate, pravastatin.  03- 03-    [facility] Embedded Care Due Messages. Reference number: [id number].   5/12/2023 2:20:34 PM CDT

## 2023-05-23 RX ORDER — IPRATROPIUM BROMIDE 21 UG/1
SPRAY, METERED NASAL
Qty: 30 ML | Refills: 0 | Status: SHIPPED | OUTPATIENT
Start: 2023-05-23

## 2023-05-29 NOTE — TELEPHONE ENCOUNTER
----- Message from Rosa Dennison sent at 8/13/2019  2:12 PM CDT -----  Contact: self/[phone]  Type:  RX Refill Request    Who Called: Renee Arteaga    Refill or New Rx:refill  RX Name and Strength:Xanax 0.5 mg  How is the patient currently taking it? (ex. 1XDay):3 a day  Is this a 30 day or 90 day RX:30  Preferred Pharmacy with phone number:  [place], [place] - [address]  [address]  [place] [address]  Phone: [phone] Fax: [phone]      Local or Mail Order:local  Ordering Provider:Dr Julio  Would the patient rather a call back or a response via [facility]? Call back   Best Call Back Number:[phone]  Additional Information: patient states she only has enough for today. Thanks/ar    Type:  RX Refill Request    Who Called: Renee Arteaga    Refill or New Rx:refill  RX Name and Strength:Fenofibrate 54 mg  How is the patient currently taking it? (ex. 1XDay):once a day  Is this a 30 day or 90 day RX:90  Preferred Pharmacy with phone number:  [place], [place] - [address]  [address]  [place] [address]  Phone: [phone] Fax: [phone]      Local or Mail Order:local  Ordering Provider:Dr Julio  Would the patient rather a call back or a response via [facility]? Call back   Best Call Back Number:[phone]  Additional Information:     
lov 1/10/19  
unknown

## 2023-06-09 DIAGNOSIS — R00.2 HEART PALPITATIONS: ICD-10-CM

## 2023-06-09 DIAGNOSIS — I75.021 BLUE TOE SYNDROME OF RIGHT LOWER EXTREMITY: ICD-10-CM

## 2023-06-09 DIAGNOSIS — I73.9 PAD (PERIPHERAL ARTERY DISEASE): ICD-10-CM

## 2023-06-09 RX ORDER — CILOSTAZOL 50 MG/1
TABLET ORAL
Qty: 60 TABLET | Refills: 0 | Status: SHIPPED | OUTPATIENT
Start: 2023-06-09 | End: 2023-07-24

## 2023-06-09 RX ORDER — METOPROLOL SUCCINATE 25 MG/1
TABLET, EXTENDED RELEASE ORAL
Qty: 90 TABLET | Refills: 0 | Status: SHIPPED | OUTPATIENT
Start: 2023-06-09 | End: 2023-10-30

## 2023-06-09 RX ORDER — PRAVASTATIN SODIUM 40 MG/1
TABLET ORAL
Qty: 90 TABLET | Refills: 0 | Status: SHIPPED | OUTPATIENT
Start: 2023-06-09

## 2023-06-09 NOTE — TELEPHONE ENCOUNTER
Refill Routing Note   Medication(s) are not appropriate for processing by [facility] for the following reason(s):      Medication outside of protocol  Required labs outdated  Drug-disease interaction    [facility] action(s):  Defer None identified   Medication Therapy Plan: Drug-Disease: metoprolol succinate and PAD (peripheral artery disease); Atherosclerotic PVD with intermittent claudication      Appointments  past 12m or future 3m with PCP    Date Provider   Last Visit   6/10/2022 Odessa Julio MD   Next Visit   Visit date not found Odessa Julio MD   ED visits in past 90 days: 0        Note composed:1:10 PM 06/09/2023

## 2023-06-09 NOTE — TELEPHONE ENCOUNTER
No care due was identified.  [facility] Embedded Care Due Messages. Reference number: [id number].   6/09/2023 12:54:03 PM CDT

## 2023-07-24 DIAGNOSIS — I73.9 PAD (PERIPHERAL ARTERY DISEASE): ICD-10-CM

## 2023-07-24 DIAGNOSIS — I75.021 BLUE TOE SYNDROME OF RIGHT LOWER EXTREMITY: ICD-10-CM

## 2023-07-24 RX ORDER — CILOSTAZOL 50 MG/1
TABLET ORAL
Qty: 60 TABLET | Refills: 2 | Status: SHIPPED | OUTPATIENT
Start: 2023-07-24

## 2023-10-26 ENCOUNTER — TELEPHONE (OUTPATIENT)
Dept: CARDIOLOGY | Facility: CLINIC | Age: 78
End: 2023-10-26
Payer: MEDICARE

## 2023-10-26 DIAGNOSIS — I10 BENIGN ESSENTIAL HYPERTENSION: Primary | ICD-10-CM

## 2023-10-29 PROBLEM — R06.09 DOE (DYSPNEA ON EXERTION): Status: ACTIVE | Noted: 2023-10-29

## 2023-10-29 PROBLEM — I35.1 NONRHEUMATIC AORTIC VALVE INSUFFICIENCY: Status: ACTIVE | Noted: 2023-10-29

## 2023-10-29 PROBLEM — I34.0 NONRHEUMATIC MITRAL VALVE REGURGITATION: Status: ACTIVE | Noted: 2023-10-29

## 2023-10-29 PROBLEM — I73.9 PAD (PERIPHERAL ARTERY DISEASE): Status: ACTIVE | Noted: 2023-10-29

## 2023-10-29 NOTE — PROGRESS NOTES
Subjective:   Patient ID:  Renee Arteaga is a 78 y.o. female who presents for evaluation of Shortness of Breath        HPI  Pt presents for eval.  Last saw [facility] mid level April 2022.  Used to see Dr. Jacob, [facility].  Chart reviewed.  Current med conditions include PAD, HTN, hyperlipidemia, AI, MR, palpitations, diastolic CHF, LAE, CRI.  Echo April 2022:  normal EF, indeterminate diastolic function, normal RVEF, mild AI, mild MR, mild TR, PAP 30 mmHg.  2 week Vital Holter 2022: NSR, no significant abnormalities noted.  C/o VALENZUELA and erratic BP, high and low.  No CP sxs.  Ecg today 10/30/23 NSR, nonspecific T wave abnl.  No clear claudication sxs.  On statin/fenofibrate for lipids.  Low energy.  Chronic palpitations is stable.        Past Medical History:   Diagnosis Date    Anticoagulant long-term use     Pletal/ASA    Anxiety     Arthritis     Atherosclerotic PVD with intermittent claudication 4/12/2018    Blue toe syndrome of right lower extremity 4/12/2018    Colon polyp 03/14/2015    Depression     Heart murmur     Hyperlipidemia     Scoliosis     Tachycardia        Current Outpatient Medications:     ammonium lactate (LAC-HYDRIN) 12 % lotion, Apply topically as needed for Dry Skin., Disp: 225 g, Rfl: 2    buPROPion (WELLBUTRIN XL) 150 MG TB24 tablet, Take 150 mg by mouth once daily., Disp: , Rfl:     cilostazoL (PLETAL) 50 MG Tab, TAKE 1 TABLET BY MOUTH TWICE A DAY., Disp: 60 tablet, Rfl: 2    fenofibrate (TRICOR) 54 MG tablet, TAKE 1 TABLET BY MOUTH ONCE DAILY., Disp: 90 tablet, Rfl: 0    fluticasone propionate (FLONASE) 50 mcg/actuation nasal spray, 1 spray (50 mcg total) by Each Nostril route once daily., Disp: 16 g, Rfl: 1    hydrOXYzine (ATARAX) 50 MG tablet, TAKE 1 TABLET BY MOUTH THREE TIMES DAILY AS NEEDED FOR ITCHING OR ANXIETY., Disp: 60 tablet, Rfl: 2    ipratropium (ATROVENT) 21 mcg (0.03 %) nasal spray, 2 SPRAYS IN EACH NOSTRIL TWICE DAILY., Disp: 30 mL, Rfl: 0    meloxicam (MOBIC) 15 MG tablet,  "Take 1 tablet (15 mg total) by mouth once daily., Disp: 90 tablet, Rfl: 0    montelukast (SINGULAIR) 10 mg tablet, Take 1 tablet (10 mg total) by mouth every evening., Disp: 90 tablet, Rfl: 1    pantoprazole (PROTONIX) 40 MG tablet, TAKE 1 TABLET BY MOUTH ONCE DAILY., Disp: 90 tablet, Rfl: 3    pravastatin (PRAVACHOL) 40 MG tablet, TAKE 1 TABLET BY MOUTH ONCE DAILY., Disp: 90 tablet, Rfl: 0    prazosin (MINIPRESS) 1 MG Cap, Take by mouth 2 (two) times daily., Disp: , Rfl:     QUEtiapine (SEROQUEL) 50 MG tablet, Take 50 mg by mouth every evening., Disp: , Rfl:     metoprolol succinate (TOPROL-XL) 25 MG 24 hr tablet, Take 1/2 pill in am and 1 full pill in pm, Disp: 135 tablet, Rfl: 3      Review of Systems   Constitutional: Positive for malaise/fatigue.   HENT: Negative.     Eyes: Negative.    Cardiovascular:  Positive for dyspnea on exertion.   Respiratory:  Positive for shortness of breath.    Endocrine: Negative.    Hematologic/Lymphatic: Negative.    Skin: Negative.    Musculoskeletal:  Positive for arthritis, back pain and joint pain.   Gastrointestinal: Negative.    Genitourinary: Negative.    Neurological: Negative.    Psychiatric/Behavioral: Negative.     Allergic/Immunologic: Negative.        BP (!) 145/74 (BP Location: Right arm, Patient Position: Sitting, BP Method: Large (Manual))   Pulse 74   Resp 16   Ht 5' 6" (1.676 m)   Wt 70 kg (154 lb 5.2 oz)   SpO2 97%   BMI 24.91 kg/m²     Wt Readings from Last 3 Encounters:   10/30/23 70 kg (154 lb 5.2 oz)   02/17/23 69.2 kg (152 lb 7.2 oz)   02/05/23 66.7 kg (147 lb)     Temp Readings from Last 3 Encounters:   02/17/23 97.7 °F (36.5 °C) (Temporal)   02/05/23 98.1 °F (36.7 °C) (Oral)   12/01/22 97.5 °F (36.4 °C)     BP Readings from Last 3 Encounters:   10/30/23 (!) 145/74   02/17/23 124/70   02/05/23 112/68     Pulse Readings from Last 3 Encounters:   10/30/23 74   02/17/23 70   02/05/23 73         Objective:   Physical Exam  Vitals and nursing note " reviewed.   Constitutional:       General: She is not in acute distress.     Appearance: Normal appearance. She is well-developed. She is not ill-appearing or diaphoretic.   HENT:      Head: Normocephalic.   Neck:      Thyroid: No thyromegaly.      Vascular: Normal carotid pulses. No carotid bruit, hepatojugular reflux or JVD.   Cardiovascular:      Rate and Rhythm: Normal rate and regular rhythm.      Chest Wall: PMI is not displaced.      Pulses: Normal pulses.           Radial pulses are 2+ on the right side and 2+ on the left side.      Heart sounds: Normal heart sounds, S1 normal and S2 normal. No murmur heard.     No friction rub. No gallop.   Pulmonary:      Effort: Pulmonary effort is normal.      Breath sounds: Normal breath sounds. No wheezing or rales.   Abdominal:      General: Bowel sounds are normal. There is no abdominal bruit.      Palpations: Abdomen is soft. There is no hepatomegaly, splenomegaly or mass.      Tenderness: There is no abdominal tenderness.   Musculoskeletal:      Cervical back: Neck supple.   Lymphadenopathy:      Cervical: No cervical adenopathy.   Skin:     General: Skin is warm.   Neurological:      Mental Status: She is alert and oriented to person, place, and time.   Psychiatric:         Behavior: Behavior normal. Behavior is cooperative.       I have reviewed all pertinent labs and cardiac studies.        Chemistry        Component Value Date/Time     (L) 07/26/2022 1022    K 5.1 07/26/2022 1022    CL 93 (L) 07/26/2022 1022    CO2 25 07/26/2022 1022    BUN 16 07/26/2022 1022    CREATININE 0.8 07/26/2022 1022    GLU 97 07/26/2022 1022        Component Value Date/Time    CALCIUM 9.4 07/26/2022 1022    ALKPHOS 54 (L) 03/31/2022 1626    AST 13 03/31/2022 1626    ALT 5 (L) 03/31/2022 1626    BILITOT 0.2 03/31/2022 1626    ESTGFRAFRICA >60.0 07/26/2022 1022    EGFRNONAA >60.0 07/26/2022 1022        Lab Results   Component Value Date    WBC 9.28 03/31/2022    HGB 10.6 (L)  "03/31/2022    HCT 32.4 (L) 03/31/2022    MCV 90 03/31/2022     03/31/2022       Lab Results   Component Value Date    TSH 1.387 07/18/2013     No results found for: "LABA1C", "HGBA1C"  Lab Results   Component Value Date    CHOL 253 (H) 06/16/2021    CHOL 234 (H) 01/10/2019    CHOL 216 (H) 10/03/2018     Lab Results   Component Value Date    HDL 70 06/16/2021    HDL 73 01/10/2019    HDL 77 (H) 10/03/2018     Lab Results   Component Value Date    LDLCALC 158.0 06/16/2021    LDLCALC 140.8 01/10/2019    LDLCALC 119.4 10/03/2018     No results found for: "DLDL"  Lab Results   Component Value Date    TRIG 125 06/16/2021    TRIG 101 01/10/2019    TRIG 98 10/03/2018       f1   Lab Results   Component Value Date    CHOLHDL 27.7 06/16/2021    CHOLHDL 31.2 01/10/2019    CHOLHDL 35.6 10/03/2018         Results for orders placed during the hospital encounter of 04/06/22    Echo    Interpretation Summary  · Severe left atrial enlargement.  · The left ventricle is normal in size with normal systolic function.  · The estimated ejection fraction is 55%.  · Indeterminate left ventricular diastolic function.  · Normal right ventricular size with normal right ventricular systolic function.  · Mild aortic regurgitation.  · Mild mitral regurgitation.  · Mild tricuspid regurgitation.  · Mild pulmonic regurgitation.  · Normal central venous pressure (3 mmHg).  · The estimated PA systolic pressure is 30 mmHg.      Assessment:      1. VALENZUELA (dyspnea on exertion)    2. Heart palpitations    3. Dyslipidemia    4. Nonrheumatic aortic valve insufficiency    5. Nonrheumatic mitral valve regurgitation    6. PAD (peripheral artery disease)    7. Abnormal ECG    8. Benign essential hypertension        Plan:         Nuclear stress test.  Echocardiogram.  CXR pa/lateral.  Discussed possible LHC if stress test + for ischemia.  Risks/benefits discussed.  Labs: CBC, CMP, TSH, BNP.  HTN: Continue Toprol xl 25 mg pm.  Add 12.5 mg dose in am.  Valvular " heart conditions: Echo.  Abnl ecg : monitor.  Lipids: statin tx.  PAD: continue med tx.  Cardiac diet.  Exercise as tolerated.  Palpitations: beta-blocker tx. Monitor.    F/u after above tests.          I have reviewed all pertinent labs and cardiac studies independently. Plans and recommendations have been formulated under my direct supervision. All questions answered and patient voiced understanding.

## 2023-10-30 ENCOUNTER — OFFICE VISIT (OUTPATIENT)
Dept: CARDIOLOGY | Facility: CLINIC | Age: 78
End: 2023-10-30
Payer: MEDICARE

## 2023-10-30 ENCOUNTER — HOSPITAL ENCOUNTER (OUTPATIENT)
Dept: RADIOLOGY | Facility: HOSPITAL | Age: 78
Discharge: HOME | End: 2023-10-30
Attending: INTERNAL MEDICINE
Payer: MEDICARE

## 2023-10-30 ENCOUNTER — HOSPITAL ENCOUNTER (OUTPATIENT)
Dept: CARDIOLOGY | Facility: HOSPITAL | Age: 78
Discharge: HOME | End: 2023-10-30
Attending: INTERNAL MEDICINE
Payer: MEDICARE

## 2023-10-30 VITALS
SYSTOLIC BLOOD PRESSURE: 145 MMHG | DIASTOLIC BLOOD PRESSURE: 74 MMHG | BODY MASS INDEX: 24.8 KG/M2 | HEIGHT: 66 IN | RESPIRATION RATE: 16 BRPM | OXYGEN SATURATION: 97 % | HEART RATE: 74 BPM | WEIGHT: 154.31 LBS

## 2023-10-30 DIAGNOSIS — I34.0 NONRHEUMATIC MITRAL VALVE REGURGITATION: ICD-10-CM

## 2023-10-30 DIAGNOSIS — I35.1 NONRHEUMATIC AORTIC VALVE INSUFFICIENCY: ICD-10-CM

## 2023-10-30 DIAGNOSIS — R06.09 DOE (DYSPNEA ON EXERTION): Primary | ICD-10-CM

## 2023-10-30 DIAGNOSIS — R94.31 ABNORMAL ECG: ICD-10-CM

## 2023-10-30 DIAGNOSIS — I73.9 PAD (PERIPHERAL ARTERY DISEASE): ICD-10-CM

## 2023-10-30 DIAGNOSIS — I10 BENIGN ESSENTIAL HYPERTENSION: ICD-10-CM

## 2023-10-30 DIAGNOSIS — E78.5 DYSLIPIDEMIA: Chronic | ICD-10-CM

## 2023-10-30 DIAGNOSIS — R06.09 DOE (DYSPNEA ON EXERTION): ICD-10-CM

## 2023-10-30 DIAGNOSIS — R00.2 HEART PALPITATIONS: ICD-10-CM

## 2023-10-30 PROCEDURE — 99999 PR PBB SHADOW E&M-EST. PATIENT-LVL III: ICD-10-PCS | Mod: PBBFAC,,, | Performed by: INTERNAL MEDICINE

## 2023-10-30 PROCEDURE — 99215 OFFICE O/P EST HI 40 MIN: CPT | Mod: S$GLB,,, | Performed by: INTERNAL MEDICINE

## 2023-10-30 PROCEDURE — 3077F SYST BP >= 140 MM HG: CPT | Mod: CPTII,S$GLB,, | Performed by: INTERNAL MEDICINE

## 2023-10-30 PROCEDURE — 99999 PR PBB SHADOW E&M-EST. PATIENT-LVL III: CPT | Mod: PBBFAC,,, | Performed by: INTERNAL MEDICINE

## 2023-10-30 PROCEDURE — 3078F DIAST BP <80 MM HG: CPT | Mod: CPTII,S$GLB,, | Performed by: INTERNAL MEDICINE

## 2023-10-30 PROCEDURE — 3078F PR MOST RECENT DIASTOLIC BLOOD PRESSURE < 80 MM HG: ICD-10-PCS | Mod: CPTII,S$GLB,, | Performed by: INTERNAL MEDICINE

## 2023-10-30 PROCEDURE — 71046 X-RAY EXAM CHEST 2 VIEWS: CPT | Mod: TC

## 2023-10-30 PROCEDURE — 1159F MED LIST DOCD IN RCRD: CPT | Mod: CPTII,S$GLB,, | Performed by: INTERNAL MEDICINE

## 2023-10-30 PROCEDURE — 1159F PR MEDICATION LIST DOCUMENTED IN MEDICAL RECORD: ICD-10-PCS | Mod: CPTII,S$GLB,, | Performed by: INTERNAL MEDICINE

## 2023-10-30 PROCEDURE — 99215 PR OFFICE/OUTPT VISIT, EST, LEVL V, 40-54 MIN: ICD-10-PCS | Mod: S$GLB,,, | Performed by: INTERNAL MEDICINE

## 2023-10-30 PROCEDURE — 1126F PR PAIN SEVERITY QUANTIFIED, NO PAIN PRESENT: ICD-10-PCS | Mod: CPTII,S$GLB,, | Performed by: INTERNAL MEDICINE

## 2023-10-30 PROCEDURE — 3077F PR MOST RECENT SYSTOLIC BLOOD PRESSURE >= 140 MM HG: ICD-10-PCS | Mod: CPTII,S$GLB,, | Performed by: INTERNAL MEDICINE

## 2023-10-30 PROCEDURE — 93010 EKG 12-LEAD: ICD-10-PCS | Mod: ,,, | Performed by: INTERNAL MEDICINE

## 2023-10-30 PROCEDURE — 1126F AMNT PAIN NOTED NONE PRSNT: CPT | Mod: CPTII,S$GLB,, | Performed by: INTERNAL MEDICINE

## 2023-10-30 PROCEDURE — 71046 XR CHEST PA AND LATERAL: ICD-10-PCS | Mod: 26,,, | Performed by: RADIOLOGY

## 2023-10-30 PROCEDURE — 93010 ELECTROCARDIOGRAM REPORT: CPT | Mod: ,,, | Performed by: INTERNAL MEDICINE

## 2023-10-30 PROCEDURE — 93005 ELECTROCARDIOGRAM TRACING: CPT

## 2023-10-30 PROCEDURE — 71046 X-RAY EXAM CHEST 2 VIEWS: CPT | Mod: 26,,, | Performed by: RADIOLOGY

## 2023-10-30 RX ORDER — BUPROPION HYDROCHLORIDE 150 MG/1
150 TABLET ORAL DAILY
COMMUNITY

## 2023-10-30 RX ORDER — PRAZOSIN HYDROCHLORIDE 1 MG/1
CAPSULE ORAL 2 TIMES DAILY
COMMUNITY

## 2023-10-30 RX ORDER — QUETIAPINE FUMARATE 50 MG/1
50 TABLET, FILM COATED ORAL NIGHTLY
COMMUNITY

## 2023-10-30 RX ORDER — METOPROLOL SUCCINATE 25 MG/1
TABLET, EXTENDED RELEASE ORAL
Qty: 135 TABLET | Refills: 3 | Status: SHIPPED | OUTPATIENT
Start: 2023-10-30

## 2023-10-31 ENCOUNTER — TELEPHONE (OUTPATIENT)
Dept: CARDIOLOGY | Facility: CLINIC | Age: 78
End: 2023-10-31
Payer: MEDICARE

## 2023-10-31 ENCOUNTER — PATIENT MESSAGE (OUTPATIENT)
Dept: CARDIOLOGY | Facility: CLINIC | Age: 78
End: 2023-10-31
Payer: MEDICARE

## 2023-10-31 DIAGNOSIS — E87.1 HYPONATREMIA: Primary | ICD-10-CM

## 2023-10-31 NOTE — TELEPHONE ENCOUNTER
Called patient to let her know that a referral has been put in for Nephrology and that she needs to call and get the appointment scheduled. I also sent her a message on patient portal to remind her to schedule the appoint ment. KATE

## 2023-10-31 NOTE — TELEPHONE ENCOUNTER
Please call pt.  BNP heart failure blood test is normal.    Labs do show low sodium level 124.  Recommend Nephrology consult asap for the abnormality.  Please schedule asap.    Await other CV tests.    Dr Villavicencio

## 2023-11-08 ENCOUNTER — HOSPITAL ENCOUNTER (OUTPATIENT)
Dept: RADIOLOGY | Facility: HOSPITAL | Age: 78
Discharge: HOME | End: 2023-11-08
Attending: INTERNAL MEDICINE
Payer: MEDICARE

## 2023-11-08 ENCOUNTER — HOSPITAL ENCOUNTER (OUTPATIENT)
Dept: PULMONOLOGY | Facility: HOSPITAL | Age: 78
Discharge: HOME | End: 2023-11-08
Attending: INTERNAL MEDICINE
Payer: MEDICARE

## 2023-11-08 ENCOUNTER — HOSPITAL ENCOUNTER (OUTPATIENT)
Dept: CARDIOLOGY | Facility: HOSPITAL | Age: 78
Discharge: HOME | End: 2023-11-08
Attending: INTERNAL MEDICINE
Payer: MEDICARE

## 2023-11-08 VITALS
SYSTOLIC BLOOD PRESSURE: 182 MMHG | DIASTOLIC BLOOD PRESSURE: 82 MMHG | WEIGHT: 154 LBS | HEART RATE: 64 BPM | BODY MASS INDEX: 24.75 KG/M2 | HEIGHT: 66 IN

## 2023-11-08 VITALS
DIASTOLIC BLOOD PRESSURE: 74 MMHG | BODY MASS INDEX: 24.75 KG/M2 | WEIGHT: 154 LBS | SYSTOLIC BLOOD PRESSURE: 145 MMHG | HEIGHT: 66 IN

## 2023-11-08 DIAGNOSIS — I10 BENIGN ESSENTIAL HYPERTENSION: ICD-10-CM

## 2023-11-08 DIAGNOSIS — R94.31 ABNORMAL ECG: ICD-10-CM

## 2023-11-08 DIAGNOSIS — R06.09 DOE (DYSPNEA ON EXERTION): ICD-10-CM

## 2023-11-08 DIAGNOSIS — I34.0 NONRHEUMATIC MITRAL VALVE REGURGITATION: ICD-10-CM

## 2023-11-08 DIAGNOSIS — R00.2 HEART PALPITATIONS: ICD-10-CM

## 2023-11-08 DIAGNOSIS — I73.9 PAD (PERIPHERAL ARTERY DISEASE): ICD-10-CM

## 2023-11-08 DIAGNOSIS — I35.1 NONRHEUMATIC AORTIC VALVE INSUFFICIENCY: ICD-10-CM

## 2023-11-08 LAB
AORTIC ROOT ANNULUS: 3.25 CM
AV INDEX (PROSTH): 0.55
AV MEAN GRADIENT: 7 MMHG
AV PEAK GRADIENT: 14 MMHG
AV REGURGITATION PRESSURE HALF TIME: 461.1 MS
AV VALVE AREA BY VELOCITY RATIO: 1.75 CM²
AV VALVE AREA: 1.81 CM²
AV VELOCITY RATIO: 0.53
BSA FOR ECHO PROCEDURE: 1.8 M2
CV ECHO LV RWT: 0.36 CM
CV STRESS BASE HR: 62 BPM
DIASTOLIC BLOOD PRESSURE: 82 MMHG
DOP CALC AO PEAK VEL: 1.88 M/S
DOP CALC AO VTI: 43.3 CM
DOP CALC LVOT AREA: 3.3 CM2
DOP CALC LVOT DIAMETER: 2.05 CM
DOP CALC LVOT PEAK VEL: 1 M/S
DOP CALC LVOT STROKE VOLUME: 78.52 CM3
DOP CALC RVOT PEAK VEL: 1.18 M/S
DOP CALC RVOT VTI: 29.5 CM
DOP CALCLVOT PEAK VEL VTI: 23.8 CM
E WAVE DECELERATION TIME: 291.16 MSEC
E/A RATIO: 0.52
E/E' RATIO: 10.57 M/S
ECHO LV POSTERIOR WALL: 0.86 CM (ref 0.6–1.1)
EJECTION FRACTION- HIGH: 73 %
END DIASTOLIC INDEX-HIGH: 165 ML/M2
END DIASTOLIC INDEX-LOW: 101 ML/M2
END SYSTOLIC INDEX-HIGH: 64 ML/M2
END SYSTOLIC INDEX-LOW: 28 ML/M2
FRACTIONAL SHORTENING: 38 % (ref 28–44)
INTERVENTRICULAR SEPTUM: 0.76 CM (ref 0.6–1.1)
IVRT: 138.44 MSEC
LA MAJOR: 5.6 CM
LA MINOR: 4.89 CM
LA WIDTH: 4 CM
LEFT ATRIUM SIZE: 3.45 CM
LEFT ATRIUM VOLUME INDEX MOD: 32.6 ML/M2
LEFT ATRIUM VOLUME INDEX: 34.2 ML/M2
LEFT ATRIUM VOLUME MOD: 58.35 CM3
LEFT ATRIUM VOLUME: 61.24 CM3
LEFT INTERNAL DIMENSION IN SYSTOLE: 2.98 CM (ref 2.1–4)
LEFT VENTRICLE DIASTOLIC VOLUME INDEX: 59.17 ML/M2
LEFT VENTRICLE DIASTOLIC VOLUME: 105.91 ML
LEFT VENTRICLE MASS INDEX: 71 G/M2
LEFT VENTRICLE SYSTOLIC VOLUME INDEX: 19.3 ML/M2
LEFT VENTRICLE SYSTOLIC VOLUME: 34.56 ML
LEFT VENTRICULAR INTERNAL DIMENSION IN DIASTOLE: 4.77 CM (ref 3.5–6)
LEFT VENTRICULAR MASS: 127.38 G
LV LATERAL E/E' RATIO: 10.57 M/S
LV SEPTAL E/E' RATIO: 10.57 M/S
LVOT MG: 1.85 MMHG
LVOT MV: 0.64 CM/S
MV PEAK A VEL: 1.42 M/S
MV PEAK E VEL: 0.74 M/S
MV STENOSIS PRESSURE HALF TIME: 84.44 MS
MV VALVE AREA P 1/2 METHOD: 2.61 CM2
NUC REST EJECTION FRACTION: 78
NUC STRESS EJECTION FRACTION: 76 %
OHS CV CPX 85 PERCENT MAX PREDICTED HEART RATE MALE: 122
OHS CV CPX MAX PREDICTED HEART RATE: 143
OHS CV CPX PATIENT IS FEMALE: 1
OHS CV CPX PATIENT IS MALE: 0
OHS CV CPX PEAK DIASTOLIC BLOOD PRESSURE: 82 MMHG
OHS CV CPX PEAK HEAR RATE: 93 BPM
OHS CV CPX PEAK RATE PRESSURE PRODUCT: NORMAL
OHS CV CPX PEAK SYSTOLIC BLOOD PRESSURE: 182 MMHG
OHS CV CPX PERCENT MAX PREDICTED HEART RATE ACHIEVED: 67
OHS CV CPX RATE PRESSURE PRODUCT PRESENTING: NORMAL
PISA AR MAX VEL: 5.13 M/S
PISA TR MAX VEL: 2.73 M/S
PULM VEIN S/D RATIO: 1.66
PV MEAN GRADIENT: 3 MMHG
PV PEAK D VEL: 0.35 M/S
PV PEAK S VEL: 0.58 M/S
RA MAJOR: 4.49 CM
RA PRESSURE ESTIMATED: 3 MMHG
RA WIDTH: 2.7 CM
RETIRED EF AND QEF - SEE NOTES: 59 %
RIGHT VENTRICULAR END-DIASTOLIC DIMENSION: 2.47 CM
RV TB RVSP: 6 MMHG
STJ: 2.84 CM
SYSTOLIC BLOOD PRESSURE: 182 MMHG
TDI LATERAL: 0.07 M/S
TDI SEPTAL: 0.07 M/S
TDI: 0.07 M/S
TR MAX PG: 30 MMHG
TRICUSPID ANNULAR PLANE SYSTOLIC EXCURSION: 2.11 CM
TV REST PULMONARY ARTERY PRESSURE: 33 MMHG
Z-SCORE OF LEFT VENTRICULAR DIMENSION IN END DIASTOLE: -0.37
Z-SCORE OF LEFT VENTRICULAR DIMENSION IN END SYSTOLE: -0.21

## 2023-11-08 PROCEDURE — 63600175 PHARM REV CODE 636 W HCPCS: Performed by: INTERNAL MEDICINE

## 2023-11-08 PROCEDURE — 93016 NUCLEAR STRESS - CARDIOLOGY INTERPRETED (CUPID ONLY): ICD-10-PCS | Mod: ,,, | Performed by: INTERNAL MEDICINE

## 2023-11-08 PROCEDURE — 93018 CV STRESS TEST I&R ONLY: CPT | Mod: ,,, | Performed by: INTERNAL MEDICINE

## 2023-11-08 PROCEDURE — 78452 HT MUSCLE IMAGE SPECT MULT: CPT | Mod: 26,,, | Performed by: INTERNAL MEDICINE

## 2023-11-08 PROCEDURE — 93306 TTE W/DOPPLER COMPLETE: CPT | Mod: 26,,, | Performed by: INTERNAL MEDICINE

## 2023-11-08 PROCEDURE — 93306 ECHO (CUPID ONLY): ICD-10-PCS | Mod: 26,,, | Performed by: INTERNAL MEDICINE

## 2023-11-08 PROCEDURE — 78452 NUCLEAR STRESS - CARDIOLOGY INTERPRETED (CUPID ONLY): ICD-10-PCS | Mod: 26,,, | Performed by: INTERNAL MEDICINE

## 2023-11-08 PROCEDURE — 93018 NUCLEAR STRESS - CARDIOLOGY INTERPRETED (CUPID ONLY): ICD-10-PCS | Mod: ,,, | Performed by: INTERNAL MEDICINE

## 2023-11-08 PROCEDURE — 93017 CV STRESS TEST TRACING ONLY: CPT

## 2023-11-08 PROCEDURE — 93016 CV STRESS TEST SUPVJ ONLY: CPT | Mod: ,,, | Performed by: INTERNAL MEDICINE

## 2023-11-08 PROCEDURE — 93306 TTE W/DOPPLER COMPLETE: CPT

## 2023-11-08 RX ORDER — REGADENOSON 0.08 MG/ML
0.4 INJECTION, SOLUTION INTRAVENOUS ONCE
Status: COMPLETED | OUTPATIENT
Start: 2023-11-08 | End: 2023-11-08

## 2023-11-08 RX ADMIN — REGADENOSON 0.4 MG: 0.08 INJECTION, SOLUTION INTRAVENOUS at 11:11

## 2023-11-29 ENCOUNTER — OFFICE VISIT (OUTPATIENT)
Dept: CARDIOLOGY | Facility: CLINIC | Age: 78
End: 2023-11-29
Payer: MEDICARE

## 2023-11-29 ENCOUNTER — TELEPHONE (OUTPATIENT)
Dept: CARDIOLOGY | Facility: CLINIC | Age: 78
End: 2023-11-29

## 2023-11-29 VITALS
BODY MASS INDEX: 25.3 KG/M2 | HEIGHT: 66 IN | HEART RATE: 71 BPM | WEIGHT: 157.44 LBS | SYSTOLIC BLOOD PRESSURE: 140 MMHG | DIASTOLIC BLOOD PRESSURE: 90 MMHG | OXYGEN SATURATION: 97 %

## 2023-11-29 DIAGNOSIS — E87.1 HYPONATREMIA: ICD-10-CM

## 2023-11-29 DIAGNOSIS — I73.9 PAD (PERIPHERAL ARTERY DISEASE): ICD-10-CM

## 2023-11-29 DIAGNOSIS — N18.31 CHRONIC KIDNEY DISEASE, STAGE 3A: ICD-10-CM

## 2023-11-29 DIAGNOSIS — R94.31 ABNORMAL ECG: ICD-10-CM

## 2023-11-29 DIAGNOSIS — I25.118 CORONARY ARTERY DISEASE OF NATIVE ARTERY OF NATIVE HEART WITH STABLE ANGINA PECTORIS: ICD-10-CM

## 2023-11-29 DIAGNOSIS — R94.39 ABNORMAL NUCLEAR STRESS TEST: ICD-10-CM

## 2023-11-29 DIAGNOSIS — R00.2 HEART PALPITATIONS: ICD-10-CM

## 2023-11-29 DIAGNOSIS — R06.09 DOE (DYSPNEA ON EXERTION): Primary | ICD-10-CM

## 2023-11-29 DIAGNOSIS — I34.0 NONRHEUMATIC MITRAL VALVE REGURGITATION: ICD-10-CM

## 2023-11-29 DIAGNOSIS — I35.1 NONRHEUMATIC AORTIC VALVE INSUFFICIENCY: ICD-10-CM

## 2023-11-29 DIAGNOSIS — E78.5 DYSLIPIDEMIA: Chronic | ICD-10-CM

## 2023-11-29 DIAGNOSIS — I10 BENIGN ESSENTIAL HYPERTENSION: ICD-10-CM

## 2023-11-29 PROCEDURE — 1126F PR PAIN SEVERITY QUANTIFIED, NO PAIN PRESENT: ICD-10-PCS | Mod: CPTII,S$GLB,, | Performed by: INTERNAL MEDICINE

## 2023-11-29 PROCEDURE — 3077F PR MOST RECENT SYSTOLIC BLOOD PRESSURE >= 140 MM HG: ICD-10-PCS | Mod: CPTII,S$GLB,, | Performed by: INTERNAL MEDICINE

## 2023-11-29 PROCEDURE — 99215 OFFICE O/P EST HI 40 MIN: CPT | Mod: S$GLB,,, | Performed by: INTERNAL MEDICINE

## 2023-11-29 PROCEDURE — 3077F SYST BP >= 140 MM HG: CPT | Mod: CPTII,S$GLB,, | Performed by: INTERNAL MEDICINE

## 2023-11-29 PROCEDURE — 99999 PR PBB SHADOW E&M-EST. PATIENT-LVL III: CPT | Mod: PBBFAC,,, | Performed by: INTERNAL MEDICINE

## 2023-11-29 PROCEDURE — 3080F DIAST BP >= 90 MM HG: CPT | Mod: CPTII,S$GLB,, | Performed by: INTERNAL MEDICINE

## 2023-11-29 PROCEDURE — 3080F PR MOST RECENT DIASTOLIC BLOOD PRESSURE >= 90 MM HG: ICD-10-PCS | Mod: CPTII,S$GLB,, | Performed by: INTERNAL MEDICINE

## 2023-11-29 PROCEDURE — 1126F AMNT PAIN NOTED NONE PRSNT: CPT | Mod: CPTII,S$GLB,, | Performed by: INTERNAL MEDICINE

## 2023-11-29 PROCEDURE — 99999 PR PBB SHADOW E&M-EST. PATIENT-LVL III: ICD-10-PCS | Mod: PBBFAC,,, | Performed by: INTERNAL MEDICINE

## 2023-11-29 PROCEDURE — 99215 PR OFFICE/OUTPT VISIT, EST, LEVL V, 40-54 MIN: ICD-10-PCS | Mod: S$GLB,,, | Performed by: INTERNAL MEDICINE

## 2023-11-29 RX ORDER — ASPIRIN 81 MG/1
81 TABLET ORAL DAILY
Qty: 30 TABLET | Refills: 12 | Status: SHIPPED | OUTPATIENT
Start: 2023-11-29 | End: 2024-11-28

## 2023-11-29 RX ORDER — ISOSORBIDE MONONITRATE 30 MG/1
30 TABLET, EXTENDED RELEASE ORAL DAILY
Qty: 30 TABLET | Refills: 11 | Status: SHIPPED | OUTPATIENT
Start: 2023-11-29 | End: 2024-01-19 | Stop reason: SINTOL

## 2023-11-29 NOTE — PROGRESS NOTES
Subjective:   Patient ID:  Renee Arteaga is a 78 y.o. female who presents for evaluation of Abnormal Stress Test        HPI  Pt presents for eval.  Current med conditions include PAD, HTN, hyperlipidemia, AI, MR, palpitations, diastolic CHF, LAE, CRI.  Past hx pertinent for following:  Used to see Dr. Jacob, Cards.  Echo April 2022:  normal EF, indeterminate diastolic function, normal RVEF, mild AI, mild MR, mild TR, PAP 30 mmHg.  2 week Vital Holter 2022: NSR, no significant abnormalities noted.  Pt seen late Oct 2023 for below issues:  C/o VALENZUELA and erratic BP, high and low.  No CP sxs.  Ecg 10/30/23 NSR, nonspecific T wave abnl.  Low energy.  She had stress test/echo and labs done.  BNP is normal.  Sodium is low, 124 worse than in past.  No CP.  Stable VALENZUELA.  Echo Nov 2023: normal EF, DD, mild TR, PAP 33 mmHg.  Nuclear stress test Nov 2023:  anteroapical ischemia, normal EF.  BP in stable range overall right now.      Past Medical History:   Diagnosis Date    Anticoagulant long-term use     Pletal/ASA    Anxiety     Arthritis     Atherosclerotic PVD with intermittent claudication 4/12/2018    Blue toe syndrome of right lower extremity 4/12/2018    Colon polyp 03/14/2015    Depression     Heart murmur     Hyperlipidemia     Scoliosis     Tachycardia        Current Outpatient Medications:     ammonium lactate (LAC-HYDRIN) 12 % lotion, Apply topically as needed for Dry Skin., Disp: 225 g, Rfl: 2    aspirin (ECOTRIN) 81 MG EC tablet, Take 1 tablet (81 mg total) by mouth once daily., Disp: 30 tablet, Rfl: 12    buPROPion (WELLBUTRIN XL) 150 MG TB24 tablet, Take 150 mg by mouth once daily., Disp: , Rfl:     cilostazoL (PLETAL) 50 MG Tab, TAKE 1 TABLET BY MOUTH TWICE A DAY., Disp: 60 tablet, Rfl: 2    fenofibrate (TRICOR) 54 MG tablet, TAKE 1 TABLET BY MOUTH ONCE DAILY., Disp: 90 tablet, Rfl: 0    fluticasone propionate (FLONASE) 50 mcg/actuation nasal spray, 1 spray (50 mcg total) by Each Nostril route once daily.,  "Disp: 16 g, Rfl: 1    hydrOXYzine (ATARAX) 50 MG tablet, TAKE 1 TABLET BY MOUTH THREE TIMES DAILY AS NEEDED FOR ITCHING OR ANXIETY., Disp: 60 tablet, Rfl: 2    ipratropium (ATROVENT) 21 mcg (0.03 %) nasal spray, 2 SPRAYS IN EACH NOSTRIL TWICE DAILY., Disp: 30 mL, Rfl: 0    isosorbide mononitrate (IMDUR) 30 MG 24 hr tablet, Take 1 tablet (30 mg total) by mouth once daily., Disp: 30 tablet, Rfl: 11    meloxicam (MOBIC) 15 MG tablet, Take 1 tablet (15 mg total) by mouth once daily., Disp: 90 tablet, Rfl: 0    metoprolol succinate (TOPROL-XL) 25 MG 24 hr tablet, Take 1/2 pill in am and 1 full pill in pm, Disp: 135 tablet, Rfl: 3    montelukast (SINGULAIR) 10 mg tablet, Take 1 tablet (10 mg total) by mouth every evening., Disp: 90 tablet, Rfl: 1    pantoprazole (PROTONIX) 40 MG tablet, TAKE 1 TABLET BY MOUTH ONCE DAILY., Disp: 90 tablet, Rfl: 3    pravastatin (PRAVACHOL) 40 MG tablet, TAKE 1 TABLET BY MOUTH ONCE DAILY., Disp: 90 tablet, Rfl: 0    prazosin (MINIPRESS) 1 MG Cap, Take by mouth 2 (two) times daily., Disp: , Rfl:     QUEtiapine (SEROQUEL) 50 MG tablet, Take 50 mg by mouth every evening., Disp: , Rfl:       Review of Systems   Constitutional: Positive for malaise/fatigue.   HENT: Negative.     Eyes: Negative.    Cardiovascular:  Positive for dyspnea on exertion.   Respiratory:  Positive for shortness of breath.    Endocrine: Negative.    Hematologic/Lymphatic: Negative.    Skin: Negative.    Musculoskeletal:  Positive for arthritis, back pain and joint pain.   Gastrointestinal: Negative.    Genitourinary: Negative.    Neurological: Negative.    Psychiatric/Behavioral: Negative.     Allergic/Immunologic: Negative.        BP (!) 140/90 (BP Location: Left arm, Patient Position: Sitting, BP Method: Medium (Manual))   Pulse 71   Ht 5' 6" (1.676 m)   Wt 71.4 kg (157 lb 6.5 oz)   SpO2 97%   BMI 25.41 kg/m²     Wt Readings from Last 3 Encounters:   11/29/23 71.4 kg (157 lb 6.5 oz)   11/08/23 69.9 kg (154 lb) "   11/08/23 69.9 kg (154 lb)     Temp Readings from Last 3 Encounters:   02/17/23 97.7 °F (36.5 °C) (Temporal)   02/05/23 98.1 °F (36.7 °C) (Oral)   12/01/22 97.5 °F (36.4 °C)     BP Readings from Last 3 Encounters:   11/29/23 (!) 140/90   11/08/23 (!) 145/74   11/08/23 (!) 182/82     Pulse Readings from Last 3 Encounters:   11/29/23 71   11/08/23 64   10/30/23 74         Objective:   Physical Exam  Vitals and nursing note reviewed.   Constitutional:       General: She is not in acute distress.     Appearance: Normal appearance. She is well-developed. She is not ill-appearing or diaphoretic.   HENT:      Head: Normocephalic.   Neck:      Thyroid: No thyromegaly.      Vascular: No carotid bruit or JVD.   Cardiovascular:      Rate and Rhythm: Normal rate and regular rhythm.      Pulses: Normal pulses.           Radial pulses are 2+ on the right side and 2+ on the left side.      Heart sounds: Normal heart sounds, S1 normal and S2 normal. No murmur heard.     No friction rub. No gallop.   Pulmonary:      Effort: Pulmonary effort is normal.      Breath sounds: Normal breath sounds. No wheezing or rales.   Abdominal:      General: Bowel sounds are normal. There is no abdominal bruit.      Palpations: Abdomen is soft.      Tenderness: There is no abdominal tenderness.   Musculoskeletal:      Cervical back: Neck supple.   Lymphadenopathy:      Cervical: No cervical adenopathy.   Skin:     General: Skin is warm.   Neurological:      Mental Status: She is alert and oriented to person, place, and time.   Psychiatric:         Behavior: Behavior normal. Behavior is cooperative.       I have reviewed all pertinent labs and cardiac studies.        Chemistry        Component Value Date/Time     (L) 10/30/2023 0947    K 4.8 10/30/2023 0947    CL 94 (L) 10/30/2023 0947    CO2 19 (L) 10/30/2023 0947    BUN 14 10/30/2023 0947    CREATININE 1.2 10/30/2023 0947     (H) 10/30/2023 0947        Component Value Date/Time     "CALCIUM 9.5 10/30/2023 0947    ALKPHOS 73 10/30/2023 0947    AST 13 10/30/2023 0947    ALT 7 (L) 10/30/2023 0947    BILITOT 0.4 10/30/2023 0947    ESTGFRAFRICA >60.0 07/26/2022 1022    EGFRNONAA >60.0 07/26/2022 1022        Lab Results   Component Value Date    WBC 7.68 10/30/2023    HGB 11.2 (L) 10/30/2023    HCT 34.0 (L) 10/30/2023    MCV 88 10/30/2023     10/30/2023       Lab Results   Component Value Date    TSH 1.020 10/30/2023     No results found for: "LABA1C", "HGBA1C"  Lab Results   Component Value Date    CHOL 253 (H) 06/16/2021    CHOL 234 (H) 01/10/2019    CHOL 216 (H) 10/03/2018     Lab Results   Component Value Date    HDL 70 06/16/2021    HDL 73 01/10/2019    HDL 77 (H) 10/03/2018     Lab Results   Component Value Date    LDLCALC 158.0 06/16/2021    LDLCALC 140.8 01/10/2019    LDLCALC 119.4 10/03/2018     No results found for: "DLDL"  Lab Results   Component Value Date    TRIG 125 06/16/2021    TRIG 101 01/10/2019    TRIG 98 10/03/2018       f1   Lab Results   Component Value Date    CHOLHDL 27.7 06/16/2021    CHOLHDL 31.2 01/10/2019    CHOLHDL 35.6 10/03/2018         Results for orders placed during the hospital encounter of 04/06/22    Echo    Interpretation Summary  · Severe left atrial enlargement.  · The left ventricle is normal in size with normal systolic function.  · The estimated ejection fraction is 55%.  · Indeterminate left ventricular diastolic function.  · Normal right ventricular size with normal right ventricular systolic function.  · Mild aortic regurgitation.  · Mild mitral regurgitation.  · Mild tricuspid regurgitation.  · Mild pulmonic regurgitation.  · Normal central venous pressure (3 mmHg).  · The estimated PA systolic pressure is 30 mmHg.        Results for orders placed during the hospital encounter of 11/08/23    Nuclear Stress - Cardiology Interpreted    Interpretation Summary    Abnormal myocardial perfusion scan.    There is a mild to moderate intensity, moderate " sized, reversible perfusion abnormality that is consistent with ischemia in the mid to apical anterior wall(s).    There are no other significant perfusion abnormalities.    The gated perfusion images showed an ejection fraction of 78% at rest. The gated perfusion images showed an ejection fraction of 76% post stress. Normal ejection fraction is greater than 59%.    The ECG portion of the study is negative for ischemia.          Results for orders placed during the hospital encounter of 11/08/23    Echo    Interpretation Summary    Left Ventricle: The left ventricle is normal in size. Ventricular mass is normal. Normal wall thickness. Normal wall motion. There is normal systolic function with a visually estimated ejection fraction of 55 - 60%. There is normal diastolic function.    Right Ventricle: Normal right ventricular cavity size. Wall thickness is normal. Right ventricle wall motion  is normal. Systolic function is normal.    Tricuspid Valve: There is mild regurgitation.    Pulmonic Valve: There is mild regurgitation.    Pulmonary Artery: The estimated pulmonary artery systolic pressure is 33 mmHg.    IVC/SVC: Normal venous pressure at 3 mmHg.      Assessment:      1. VALENZUELA (dyspnea on exertion)    2. Hyponatremia    3. Coronary artery disease of native artery of native heart with stable angina pectoris    4. Abnormal nuclear stress test    5. Abnormal ECG    6. PAD (peripheral artery disease)    7. Nonrheumatic mitral valve regurgitation    8. Nonrheumatic aortic valve insufficiency    9. Heart palpitations    10. Dyslipidemia    11. Chronic kidney disease, stage 3a    12. Benign essential hypertension        Plan:         Abnormal nuclear stress test: anteroapical ischemia.  VALENZUELA may be anginal equivalent?; however seems stable.  Discussed indications for LHC/PCI.  Risks/benefits discussed.  CRI poses an increased risk but seems stable.  Also has low sodium.  Want pt seen by Nephrology for her hyponatremia and  would proceed with [facility] once this is improved.  Pt and family understand.  If she is agreeable, will proceed this coming month or Jan (her preference at present time).  Add Imdur 30 mg qd for CAD/HTN.  Add 81 mg ASA qd.  Patient advised of indications for above medications, risks/benefits and side effect profile.   Valvular heart conditions: monitor.  Abnl ecg : monitor.  Lipids: statin tx.  PAD: continue med tx.  Cardiac diet.  Exercise as tolerated.  Palpitations: beta-blocker tx. Monitor.    F/u 8 weeks.          I have reviewed all pertinent labs and cardiac studies independently. Plans and recommendations have been formulated under my direct supervision. All questions answered and patient voiced understanding.

## 2023-11-29 NOTE — TELEPHONE ENCOUNTER
Telephoned patient to discuss/confirm appointment tomorrow at 10:30 with Dr. Angulo or next available in January  Scheduled  for tomorrow and left a voice mail message for call back to direct contact line

## 2023-11-29 NOTE — TELEPHONE ENCOUNTER
Marycarmen,    Discussed [facility] with pt/family.  Want her to see Nephrology first for low sodium level.  Will see if we can do the cath in December after Nephrology eval and improvement in sodium or Geraldo (pt preference).    Thanks    Dr Villavicencio

## 2023-12-08 DIAGNOSIS — N18.31 CHRONIC KIDNEY DISEASE, STAGE 3A: Primary | ICD-10-CM

## 2023-12-12 ENCOUNTER — LAB VISIT (OUTPATIENT)
Dept: LAB | Facility: HOSPITAL | Age: 78
End: 2023-12-12
Attending: INTERNAL MEDICINE
Payer: MEDICARE

## 2023-12-12 DIAGNOSIS — N18.31 CHRONIC KIDNEY DISEASE, STAGE 3A: ICD-10-CM

## 2023-12-12 LAB
ALBUMIN SERPL BCP-MCNC: 3.5 G/DL (ref 3.5–5.2)
ALBUMIN SERPL BCP-MCNC: 3.5 G/DL (ref 3.5–5.2)
ANION GAP SERPL CALC-SCNC: 11 MMOL/L (ref 8–16)
ANION GAP SERPL CALC-SCNC: 11 MMOL/L (ref 8–16)
BUN SERPL-MCNC: 20 MG/DL (ref 8–23)
BUN SERPL-MCNC: 20 MG/DL (ref 8–23)
CALCIUM SERPL-MCNC: 9.4 MG/DL (ref 8.7–10.5)
CALCIUM SERPL-MCNC: 9.4 MG/DL (ref 8.7–10.5)
CHLORIDE SERPL-SCNC: 95 MMOL/L (ref 95–110)
CHLORIDE SERPL-SCNC: 95 MMOL/L (ref 95–110)
CO2 SERPL-SCNC: 23 MMOL/L (ref 23–29)
CO2 SERPL-SCNC: 23 MMOL/L (ref 23–29)
CREAT SERPL-MCNC: 1.1 MG/DL (ref 0.5–1.4)
CREAT SERPL-MCNC: 1.1 MG/DL (ref 0.5–1.4)
CREAT UR-MCNC: 46 MG/DL (ref 15–325)
EST. GFR  (NO RACE VARIABLE): 51.8 ML/MIN/1.73 M^2
EST. GFR  (NO RACE VARIABLE): 51.8 ML/MIN/1.73 M^2
GLUCOSE SERPL-MCNC: 96 MG/DL (ref 70–110)
GLUCOSE SERPL-MCNC: 96 MG/DL (ref 70–110)
PHOSPHATE SERPL-MCNC: 3.5 MG/DL (ref 2.7–4.5)
PHOSPHATE SERPL-MCNC: 3.5 MG/DL (ref 2.7–4.5)
POTASSIUM SERPL-SCNC: 5.5 MMOL/L (ref 3.5–5.1)
POTASSIUM SERPL-SCNC: 5.5 MMOL/L (ref 3.5–5.1)
PROT UR-MCNC: 7 MG/DL (ref 0–15)
PROT/CREAT UR: 0.15 MG/G{CREAT} (ref 0–0.2)
SODIUM SERPL-SCNC: 129 MMOL/L (ref 136–145)
SODIUM SERPL-SCNC: 129 MMOL/L (ref 136–145)

## 2023-12-12 PROCEDURE — 36415 COLL VENOUS BLD VENIPUNCTURE: CPT | Mod: PO | Performed by: INTERNAL MEDICINE

## 2023-12-12 PROCEDURE — 80069 RENAL FUNCTION PANEL: CPT | Performed by: INTERNAL MEDICINE

## 2023-12-12 PROCEDURE — 84156 ASSAY OF PROTEIN URINE: CPT | Mod: 91 | Performed by: INTERNAL MEDICINE

## 2023-12-14 ENCOUNTER — OFFICE VISIT (OUTPATIENT)
Dept: NEPHROLOGY | Facility: CLINIC | Age: 78
End: 2023-12-14
Payer: MEDICARE

## 2023-12-14 VITALS
SYSTOLIC BLOOD PRESSURE: 140 MMHG | BODY MASS INDEX: 25.12 KG/M2 | DIASTOLIC BLOOD PRESSURE: 76 MMHG | HEART RATE: 76 BPM | RESPIRATION RATE: 18 BRPM | WEIGHT: 156.31 LBS | HEIGHT: 66 IN

## 2023-12-14 DIAGNOSIS — N18.31 STAGE 3A CHRONIC KIDNEY DISEASE: ICD-10-CM

## 2023-12-14 DIAGNOSIS — E87.1 HYPONATREMIA: Primary | ICD-10-CM

## 2023-12-14 DIAGNOSIS — I10 PRIMARY HYPERTENSION: ICD-10-CM

## 2023-12-14 DIAGNOSIS — E87.5 HYPERKALEMIA: ICD-10-CM

## 2023-12-14 PROCEDURE — 3077F PR MOST RECENT SYSTOLIC BLOOD PRESSURE >= 140 MM HG: ICD-10-PCS | Mod: CPTII,S$GLB,, | Performed by: INTERNAL MEDICINE

## 2023-12-14 PROCEDURE — 99204 OFFICE O/P NEW MOD 45 MIN: CPT | Mod: S$GLB,,, | Performed by: INTERNAL MEDICINE

## 2023-12-14 PROCEDURE — 1159F PR MEDICATION LIST DOCUMENTED IN MEDICAL RECORD: ICD-10-PCS | Mod: CPTII,S$GLB,, | Performed by: INTERNAL MEDICINE

## 2023-12-14 PROCEDURE — 3078F PR MOST RECENT DIASTOLIC BLOOD PRESSURE < 80 MM HG: ICD-10-PCS | Mod: CPTII,S$GLB,, | Performed by: INTERNAL MEDICINE

## 2023-12-14 PROCEDURE — 1126F PR PAIN SEVERITY QUANTIFIED, NO PAIN PRESENT: ICD-10-PCS | Mod: CPTII,S$GLB,, | Performed by: INTERNAL MEDICINE

## 2023-12-14 PROCEDURE — 1160F PR REVIEW ALL MEDS BY PRESCRIBER/CLIN PHARMACIST DOCUMENTED: ICD-10-PCS | Mod: CPTII,S$GLB,, | Performed by: INTERNAL MEDICINE

## 2023-12-14 PROCEDURE — 1159F MED LIST DOCD IN RCRD: CPT | Mod: CPTII,S$GLB,, | Performed by: INTERNAL MEDICINE

## 2023-12-14 PROCEDURE — 1101F PR PT FALLS ASSESS DOC 0-1 FALLS W/OUT INJ PAST YR: ICD-10-PCS | Mod: CPTII,S$GLB,, | Performed by: INTERNAL MEDICINE

## 2023-12-14 PROCEDURE — 99204 PR OFFICE/OUTPT VISIT, NEW, LEVL IV, 45-59 MIN: ICD-10-PCS | Mod: S$GLB,,, | Performed by: INTERNAL MEDICINE

## 2023-12-14 PROCEDURE — 3288F PR FALLS RISK ASSESSMENT DOCUMENTED: ICD-10-PCS | Mod: CPTII,S$GLB,, | Performed by: INTERNAL MEDICINE

## 2023-12-14 PROCEDURE — 99999 PR PBB SHADOW E&M-EST. PATIENT-LVL III: ICD-10-PCS | Mod: PBBFAC,,, | Performed by: INTERNAL MEDICINE

## 2023-12-14 PROCEDURE — 1126F AMNT PAIN NOTED NONE PRSNT: CPT | Mod: CPTII,S$GLB,, | Performed by: INTERNAL MEDICINE

## 2023-12-14 PROCEDURE — 99999 PR PBB SHADOW E&M-EST. PATIENT-LVL III: CPT | Mod: PBBFAC,,, | Performed by: INTERNAL MEDICINE

## 2023-12-14 PROCEDURE — 3078F DIAST BP <80 MM HG: CPT | Mod: CPTII,S$GLB,, | Performed by: INTERNAL MEDICINE

## 2023-12-14 PROCEDURE — 1101F PT FALLS ASSESS-DOCD LE1/YR: CPT | Mod: CPTII,S$GLB,, | Performed by: INTERNAL MEDICINE

## 2023-12-14 PROCEDURE — 1160F RVW MEDS BY RX/DR IN RCRD: CPT | Mod: CPTII,S$GLB,, | Performed by: INTERNAL MEDICINE

## 2023-12-14 PROCEDURE — 3288F FALL RISK ASSESSMENT DOCD: CPT | Mod: CPTII,S$GLB,, | Performed by: INTERNAL MEDICINE

## 2023-12-14 PROCEDURE — 3077F SYST BP >= 140 MM HG: CPT | Mod: CPTII,S$GLB,, | Performed by: INTERNAL MEDICINE

## 2023-12-14 RX ORDER — BUSPIRONE HYDROCHLORIDE 7.5 MG/1
7.5 TABLET ORAL 2 TIMES DAILY
COMMUNITY
Start: 2023-12-04

## 2023-12-14 RX ORDER — VALACYCLOVIR HYDROCHLORIDE 1 G/1
TABLET, FILM COATED ORAL
COMMUNITY
Start: 2023-11-02

## 2023-12-14 NOTE — PROGRESS NOTES
Renee Arteaga is a 78 y.o. female     HPI:    She was noted to have hyponatremia on routine laboratory studies.  Nephrology has been consulted for evaluation.  In the clinic today she is doing well and has no specific complaints.  Her laboratory studies medications were reviewed.  All Nephrology related questions were answered to her satisfaction.  On review of old laboratory studies she has run serum sodiums between about 125 and 130 for the past few years.  On her medication list she is on Seroquel and relates she has been on this medication for a long time.  She is also on a couple of other medications to include Wellbutrin and BuSpar.  All 3 of these medications can contribute to or cause hyponatremia.  She also relates that she eats a carbohydrate rich diet that is poor in protein.    PAST MEDICAL HISTORY:  She  has a past medical history of Anticoagulant long-term use, Anxiety, Arthritis, Atherosclerotic PVD with intermittent claudication (4/12/2018), Blue toe syndrome of right lower extremity (4/12/2018), Colon polyp (03/14/2015), Depression, Heart murmur, Hyperlipidemia, Scoliosis, and Tachycardia.    PAST SURGICAL HISTORY:  She  has a past surgical history that includes Hysterectomy; Back surgery; bladder/rectum lift; Breast surgery; Oophorectomy; and Breast biopsy (Left, 2018).    SOCIAL HISTORY:  She  reports that she has never smoked. She has never used smokeless tobacco. She reports that she does not drink alcohol and does not use drugs.      FAMILY MEDICAL HISTORY:  Her family history includes Arthritis in her mother; Heart disease in her father; Lung disease in her mother.    Review of patient's allergies indicates:   Allergen Reactions    Prednisone            Prior to Admission medications    Medication Sig Start Date End Date Taking? Authorizing Provider   ammonium lactate (LAC-HYDRIN) 12 % lotion Apply topically as needed for Dry Skin. 3/31/22  Yes Odessa Julio MD   aspirin (ECOTRIN) 81 MG EC  tablet Take 1 tablet (81 mg total) by mouth once daily. 11/29/23 11/28/24 Yes Jc Villavicencio MD   buPROPion (WELLBUTRIN XL) 150 MG TB24 tablet Take 150 mg by mouth once daily.   Yes Provider, Historical   busPIRone (BUSPAR) 7.5 MG tablet Take 7.5 mg by mouth 2 (two) times daily. 12/4/23  Yes Provider, Historical   cilostazoL (PLETAL) 50 MG Tab TAKE 1 TABLET BY MOUTH TWICE A DAY. 7/24/23  Yes Maddison Campos MD   fenofibrate (TRICOR) 54 MG tablet TAKE 1 TABLET BY MOUTH ONCE DAILY. 5/12/23  Yes Odessa Julio MD   fluticasone propionate (FLONASE) 50 mcg/actuation nasal spray 1 spray (50 mcg total) by Each Nostril route once daily. 3/26/20  Yes Odessa Julio MD   hydrOXYzine (ATARAX) 50 MG tablet TAKE 1 TABLET BY MOUTH THREE TIMES DAILY AS NEEDED FOR ITCHING OR ANXIETY. 3/23/23  Yes Odessa Julio MD   ipratropium (ATROVENT) 21 mcg (0.03 %) nasal spray 2 SPRAYS IN EACH NOSTRIL TWICE DAILY. 5/23/23  Yes Monster Mejia MD   meloxicam (MOBIC) 15 MG tablet Take 1 tablet (15 mg total) by mouth once daily. 4/12/23  Yes Odessa Julio MD   metoprolol succinate (TOPROL-XL) 25 MG 24 hr tablet Take 1/2 pill in am and 1 full pill in pm 10/30/23  Yes Jc Villavicencio MD   montelukast (SINGULAIR) 10 mg tablet Take 1 tablet (10 mg total) by mouth every evening. 3/23/23  Yes Odessa Julio MD   pantoprazole (PROTONIX) 40 MG tablet TAKE 1 TABLET BY MOUTH ONCE DAILY. 7/21/22  Yes Odessa Julio MD   pravastatin (PRAVACHOL) 40 MG tablet TAKE 1 TABLET BY MOUTH ONCE DAILY. 6/9/23  Yes Odessa Julio MD   QUEtiapine (SEROQUEL) 50 MG tablet Take 50 mg by mouth every evening.   Yes Provider, Historical   valACYclovir (VALTREX) 1000 MG tablet  11/2/23  Yes Provider, Historical   isosorbide mononitrate (IMDUR) 30 MG 24 hr tablet Take 1 tablet (30 mg total) by mouth once daily.  Patient not taking: Reported on 12/14/2023 11/29/23 11/28/24  Jc Villavicencio MD   prazosin (MINIPRESS) 1 MG Cap Take by mouth 2 (two) times  "daily.    Provider, Historical        REVIEW OF SYSTEMS:  Patient has no fever, fatigue, visual changes, chest pain, edema, cough, dyspnea, nausea, vomiting, constipation, diarrhea, arthralgias, pruritis, dizziness, weakness, depression, confusion.        PHYSICAL EXAM:   height is 5' 6" (1.676 m) and weight is 70.9 kg (156 lb 4.9 oz). Her blood pressure is 140/76 (abnormal) and her pulse is 76. Her respiration is 18.   Gen: WDWN female in no apparent distress  Psych: Normal mood and affect  Skin: No rashes or ulcers  Eyes: Normal conjunctiva and lids, PERRLA  ENT: Normal hearing with no oropharyngeal lesions  Neck: No JVD  Ext: No cyanosis, clubbing or edema          IMPRESSION AND RECOMMENDATIONS:      1. Hyponatremia:  She is on 3 agents that can contribute to or cause hyponatremia.  Seroquel, Wellbutrin, and BuSpar can all cause hyponatremia.  The Wellbutrin and BuSpar she relates her relatively new medications.  She has been on Seroquel for many years.  She relates that she remembers having low-sodium prior to starting the Seroquel.  She may have a reset Osmo stat.    She does not appear to have SIADH.  Will check routine laboratory studies to include urine and serum osmolality and serum uric acid level.  We discussed the importance of increasing solute in her diet, mainly increasing protein.  A carbohydrate rich diet as broken down CO2 water which can contribute to hyponatremia.    2. Hyperkalemia:  Her potassium has run high normal to elevated for the past several years.  Most recent potassium was 5.5.  She is on Mobic.  Armijo 2 inhibitors and NSAIDs can cause hyperkalemia.  I provided her with a list of high low-potassium foods.  We discussed strategies for limiting potassium in her diet.    3. CKD 3:  Serum creatinine is normal ranging between 0.8 and 1.2.  She is late stage II early stage III CKD.  EGFR is normal for her age and does not reflect an underlying progressive disease process.  Urinalysis is bland " without evidence of proteinuria.    4. Hypertension:  Blood pressure is well controlled on current regimen.

## 2024-01-18 PROBLEM — E87.5 HYPERKALEMIA: Status: ACTIVE | Noted: 2024-01-18

## 2024-01-18 NOTE — PROGRESS NOTES
Subjective:   Patient ID:  Renee Arteaga is a 78 y.o. female who presents for evaluation of Chest Pain, Hypertension, and Valvular Heart Disease        HPI  Pt presents for eval.  Current med conditions include PAD, HTN, hyperlipidemia, AI, MR, palpitations, diastolic CHF, LAE, CRI.  Past hx pertinent for following:  Used to see Dr. Jacob, Cards.  Echo April 2022:  normal EF, indeterminate diastolic function, normal RVEF, mild AI, mild MR, mild TR, PAP 30 mmHg.  2 week Vital Holter 2022: NSR, no significant abnormalities noted.  Pt seen late Oct 2023 for below issues:  C/o VALENZUELA and erratic BP, high and low.  No CP sxs. Low energy.  Ecg 10/30/23 NSR, nonspecific T wave abnl.  She had stress test/echo and labs done.  BNP was normal.  Sodium is low, 124 worse than in past.  Echo Nov 2023: normal EF, DD, mild TR, PAP 33 mmHg.  Nuclear stress test Nov 2023:  anteroapical ischemia, normal EF.  Now here.  Pt last seen Nov 2023.  Referred to Nephrology for hyponatremia and was seen 12/23.  C was discussed last appt.  Her sodium last check increased to 129 although K 5.5.  Imdur added last appt but did not tolerate it -- c/o weakness, dyspnea.  Had short lasting CP yesterday, resolved.  Dyspnea otherwise seems ok since last visit.  BP is stable today.        Past Medical History:   Diagnosis Date    Anticoagulant long-term use     Pletal/ASA    Anxiety     Arthritis     Atherosclerotic PVD with intermittent claudication 4/12/2018    Blue toe syndrome of right lower extremity 4/12/2018    Colon polyp 03/14/2015    Depression     Heart murmur     Hyperlipidemia     Scoliosis     Tachycardia        Current Outpatient Medications:     ammonium lactate (LAC-HYDRIN) 12 % lotion, Apply topically as needed for Dry Skin., Disp: 225 g, Rfl: 2    aspirin (ECOTRIN) 81 MG EC tablet, Take 1 tablet (81 mg total) by mouth once daily., Disp: 30 tablet, Rfl: 12    buPROPion (WELLBUTRIN XL) 150 MG TB24 tablet, Take 150 mg by mouth once  daily., Disp: , Rfl:     busPIRone (BUSPAR) 7.5 MG tablet, Take 7.5 mg by mouth 2 (two) times daily., Disp: , Rfl:     cilostazoL (PLETAL) 50 MG Tab, TAKE 1 TABLET BY MOUTH TWICE A DAY., Disp: 60 tablet, Rfl: 2    fenofibrate (TRICOR) 54 MG tablet, TAKE 1 TABLET BY MOUTH ONCE DAILY., Disp: 90 tablet, Rfl: 0    fluticasone propionate (FLONASE) 50 mcg/actuation nasal spray, 1 spray (50 mcg total) by Each Nostril route once daily., Disp: 16 g, Rfl: 1    hydrOXYzine (ATARAX) 50 MG tablet, TAKE 1 TABLET BY MOUTH THREE TIMES DAILY AS NEEDED FOR ITCHING OR ANXIETY., Disp: 60 tablet, Rfl: 2    ipratropium (ATROVENT) 21 mcg (0.03 %) nasal spray, 2 SPRAYS IN EACH NOSTRIL TWICE DAILY., Disp: 30 mL, Rfl: 0    meloxicam (MOBIC) 15 MG tablet, Take 1 tablet (15 mg total) by mouth once daily., Disp: 90 tablet, Rfl: 0    metoprolol succinate (TOPROL-XL) 25 MG 24 hr tablet, Take 1/2 pill in am and 1 full pill in pm, Disp: 135 tablet, Rfl: 3    montelukast (SINGULAIR) 10 mg tablet, Take 1 tablet (10 mg total) by mouth every evening., Disp: 90 tablet, Rfl: 1    nitroGLYCERIN (NITROSTAT) 0.4 MG SL tablet, Place 1 tablet (0.4 mg total) under the tongue every 5 (five) minutes as needed., Disp: 20 tablet, Rfl: 12    pantoprazole (PROTONIX) 40 MG tablet, TAKE 1 TABLET BY MOUTH ONCE DAILY., Disp: 90 tablet, Rfl: 3    pravastatin (PRAVACHOL) 40 MG tablet, TAKE 1 TABLET BY MOUTH ONCE DAILY., Disp: 90 tablet, Rfl: 0    prazosin (MINIPRESS) 1 MG Cap, Take by mouth 2 (two) times daily., Disp: , Rfl:     QUEtiapine (SEROQUEL) 50 MG tablet, Take 50 mg by mouth every evening., Disp: , Rfl:     ranolazine (RANEXA) 500 MG Tb12, Take 1 tablet (500 mg total) by mouth 2 (two) times daily., Disp: 60 tablet, Rfl: 11    valACYclovir (VALTREX) 1000 MG tablet, , Disp: , Rfl:       Review of Systems   Constitutional: Positive for malaise/fatigue.   HENT: Negative.     Eyes: Negative.    Cardiovascular:  Positive for chest pain and dyspnea on exertion.  "  Respiratory:  Positive for shortness of breath.    Endocrine: Negative.    Hematologic/Lymphatic: Negative.    Skin: Negative.    Musculoskeletal:  Positive for arthritis, back pain and joint pain.   Gastrointestinal: Negative.    Genitourinary: Negative.    Neurological: Negative.    Psychiatric/Behavioral: Negative.     Allergic/Immunologic: Negative.        /70 (BP Location: Left arm, Patient Position: Sitting, BP Method: Large (Manual))   Pulse 69   Ht 5' 6" (1.676 m)   Wt 72.3 kg (159 lb 6.3 oz)   SpO2 96%   BMI 25.73 kg/m²     Wt Readings from Last 3 Encounters:   01/19/24 72.3 kg (159 lb 6.3 oz)   12/14/23 70.9 kg (156 lb 4.9 oz)   11/29/23 71.4 kg (157 lb 6.5 oz)     Temp Readings from Last 3 Encounters:   02/17/23 97.7 °F (36.5 °C) (Temporal)   02/05/23 98.1 °F (36.7 °C) (Oral)   12/01/22 97.5 °F (36.4 °C)     BP Readings from Last 3 Encounters:   01/19/24 130/70   12/14/23 (!) 140/76   11/29/23 (!) 140/90     Pulse Readings from Last 3 Encounters:   01/19/24 69   12/14/23 76   11/29/23 71         Objective:   Physical Exam  Vitals and nursing note reviewed.   Constitutional:       General: She is not in acute distress.     Appearance: Normal appearance. She is well-developed. She is not ill-appearing or diaphoretic.   HENT:      Head: Normocephalic.   Neck:      Thyroid: No thyromegaly.      Vascular: No carotid bruit or JVD.   Cardiovascular:      Rate and Rhythm: Normal rate and regular rhythm.      Pulses: Normal pulses.           Radial pulses are 2+ on the right side and 2+ on the left side.      Heart sounds: Normal heart sounds, S1 normal and S2 normal. No murmur heard.     No friction rub. No gallop.   Pulmonary:      Effort: Pulmonary effort is normal.      Breath sounds: Normal breath sounds. No wheezing or rales.   Abdominal:      General: Bowel sounds are normal. There is no abdominal bruit.      Palpations: Abdomen is soft.      Tenderness: There is no abdominal tenderness. " "  Musculoskeletal:      Cervical back: Neck supple.   Lymphadenopathy:      Cervical: No cervical adenopathy.   Skin:     General: Skin is warm.   Neurological:      Mental Status: She is alert and oriented to person, place, and time.   Psychiatric:         Behavior: Behavior normal. Behavior is cooperative.       I have reviewed all pertinent labs and cardiac studies.        Chemistry        Component Value Date/Time     (L) 12/12/2023 1143     (L) 12/12/2023 1143    K 5.5 (H) 12/12/2023 1143    K 5.5 (H) 12/12/2023 1143    CL 95 12/12/2023 1143    CL 95 12/12/2023 1143    CO2 23 12/12/2023 1143    CO2 23 12/12/2023 1143    BUN 20 12/12/2023 1143    BUN 20 12/12/2023 1143    CREATININE 1.1 12/12/2023 1143    CREATININE 1.1 12/12/2023 1143    GLU 96 12/12/2023 1143    GLU 96 12/12/2023 1143        Component Value Date/Time    CALCIUM 9.4 12/12/2023 1143    CALCIUM 9.4 12/12/2023 1143    ALKPHOS 73 10/30/2023 0947    AST 13 10/30/2023 0947    ALT 7 (L) 10/30/2023 0947    BILITOT 0.4 10/30/2023 0947    ESTGFRAFRICA >60.0 07/26/2022 1022    EGFRNONAA >60.0 07/26/2022 1022        Lab Results   Component Value Date    WBC 7.68 10/30/2023    HGB 11.2 (L) 10/30/2023    HCT 34.0 (L) 10/30/2023    MCV 88 10/30/2023     10/30/2023       Lab Results   Component Value Date    TSH 1.020 10/30/2023     No results found for: "LABA1C", "HGBA1C"  Lab Results   Component Value Date    CHOL 253 (H) 06/16/2021    CHOL 234 (H) 01/10/2019    CHOL 216 (H) 10/03/2018     Lab Results   Component Value Date    HDL 70 06/16/2021    HDL 73 01/10/2019    HDL 77 (H) 10/03/2018     Lab Results   Component Value Date    LDLCALC 158.0 06/16/2021    LDLCALC 140.8 01/10/2019    LDLCALC 119.4 10/03/2018     No results found for: "DLDL"  Lab Results   Component Value Date    TRIG 125 06/16/2021    TRIG 101 01/10/2019    TRIG 98 10/03/2018       f1   Lab Results   Component Value Date    CHOLHDL 27.7 06/16/2021    CHOLHDL 31.2 " 01/10/2019    [facility]         Results for orders placed during the hospital encounter of 04/06/22    Echo    Interpretation Summary  · Severe left atrial enlargement.  · The left ventricle is normal in size with normal systolic function.  · The estimated ejection fraction is 55%.  · Indeterminate left ventricular diastolic function.  · Normal right ventricular size with normal right ventricular systolic function.  · Mild aortic regurgitation.  · Mild mitral regurgitation.  · Mild tricuspid regurgitation.  · Mild pulmonic regurgitation.  · Normal central venous pressure (3 mmHg).  · The estimated PA systolic pressure is 30 mmHg.        Results for orders placed during the hospital encounter of 11/08/23    Nuclear Stress - Cardiology Interpreted    Interpretation Summary    Abnormal myocardial perfusion scan.    There is a mild to moderate intensity, moderate sized, reversible perfusion abnormality that is consistent with ischemia in the mid to apical anterior wall(s).    There are no other significant perfusion abnormalities.    The gated perfusion images showed an ejection fraction of 78% at rest. The gated perfusion images showed an ejection fraction of 76% post stress. Normal ejection fraction is greater than 59%.    The ECG portion of the study is negative for ischemia.          Results for orders placed during the hospital encounter of 11/08/23    Echo    Interpretation Summary    Left Ventricle: The left ventricle is normal in size. Ventricular mass is normal. Normal wall thickness. Normal wall motion. There is normal systolic function with a visually estimated ejection fraction of 55 - 60%. There is normal diastolic function.    Right Ventricle: Normal right ventricular cavity size. Wall thickness is normal. Right ventricle wall motion  is normal. Systolic function is normal.    Tricuspid Valve: There is mild regurgitation.    Pulmonic Valve: There is mild regurgitation.    Pulmonary Artery:  The estimated pulmonary artery systolic pressure is 33 mmHg.    IVC/SVC: Normal venous pressure at 3 mmHg.      Assessment:      1. AP (angina pectoris)    2. Abnormal ECG    3. Abnormal nuclear stress test    4. Atherosclerotic PVD with intermittent claudication    5. Benign essential hypertension    6. Chronic kidney disease, stage 3a    7. VALENZUELA (dyspnea on exertion)    8. Heart palpitations    9. Nonrheumatic mitral valve regurgitation    10. Nonrheumatic aortic valve insufficiency    11. PAD (peripheral artery disease)    12. Hypo-osmolality and hyponatremia    13. Hyperkalemia        Plan:       Chest pain/angina and + stress test.  Will need LHC.  Abnormal nuclear stress test: anteroapical ischemia.  VALENZUELA may be anginal equivalent?.  Add Ranexa 500 mg bid for angina.  Sl ntg prn for concerning angina.  Pt informed how to use sl ntg and when to go to ER.  Patient advised of indications for above medications, risks/benefits and side effect profile.   Discussed indications for LHC/PCI.  Risks/benefits discussed.  CRI poses an increased risk but seems stable.  Will recheck her precath labs today to reassess her K+ and Na+ levels and if safe, will schedule LHC near future.  CRI: Continue f/u w Nephrology for her hyponatremia and hyperkalemia.  81 mg ASA qd.  Valvular heart conditions: monitor.  Abnl ecg : monitor.  Lipids: statin tx.  PAD: continue med tx.  Cardiac diet.  Exercise as tolerated.  Palpitations: beta-blocker tx. Monitor.    PHONE REVIEW.    F/U after LHC.          I have reviewed all pertinent labs and cardiac studies independently. Plans and recommendations have been formulated under my direct supervision. All questions answered and patient voiced understanding.

## 2024-01-18 NOTE — H&P (VIEW-ONLY)
Subjective:   Patient ID:  Renee Arteaga is a 78 y.o. female who presents for evaluation of Chest Pain, Hypertension, and Valvular Heart Disease        HPI  Pt presents for eval.  Current med conditions include PAD, HTN, hyperlipidemia, AI, MR, palpitations, diastolic CHF, LAE, CRI.  Past hx pertinent for following:  Used to see Dr. Jacob, Cards.  Echo April 2022:  normal EF, indeterminate diastolic function, normal RVEF, mild AI, mild MR, mild TR, PAP 30 mmHg.  2 week Vital Holter 2022: NSR, no significant abnormalities noted.  Pt seen late Oct 2023 for below issues:  C/o VALENZUELA and erratic BP, high and low.  No CP sxs. Low energy.  Ecg 10/30/23 NSR, nonspecific T wave abnl.  She had stress test/echo and labs done.  BNP was normal.  Sodium is low, 124 worse than in past.  Echo Nov 2023: normal EF, DD, mild TR, PAP 33 mmHg.  Nuclear stress test Nov 2023:  anteroapical ischemia, normal EF.  Now here.  Pt last seen Nov 2023.  Referred to Nephrology for hyponatremia and was seen 12/23.  C was discussed last appt.  Her sodium last check increased to 129 although K 5.5.  Imdur added last appt but did not tolerate it -- c/o weakness, dyspnea.  Had short lasting CP yesterday, resolved.  Dyspnea otherwise seems ok since last visit.  BP is stable today.        Past Medical History:   Diagnosis Date    Anticoagulant long-term use     Pletal/ASA    Anxiety     Arthritis     Atherosclerotic PVD with intermittent claudication 4/12/2018    Blue toe syndrome of right lower extremity 4/12/2018    Colon polyp 03/14/2015    Depression     Heart murmur     Hyperlipidemia     Scoliosis     Tachycardia        Current Outpatient Medications:     ammonium lactate (LAC-HYDRIN) 12 % lotion, Apply topically as needed for Dry Skin., Disp: 225 g, Rfl: 2    aspirin (ECOTRIN) 81 MG EC tablet, Take 1 tablet (81 mg total) by mouth once daily., Disp: 30 tablet, Rfl: 12    buPROPion (WELLBUTRIN XL) 150 MG TB24 tablet, Take 150 mg by mouth once  daily., Disp: , Rfl:     busPIRone (BUSPAR) 7.5 MG tablet, Take 7.5 mg by mouth 2 (two) times daily., Disp: , Rfl:     cilostazoL (PLETAL) 50 MG Tab, TAKE 1 TABLET BY MOUTH TWICE A DAY., Disp: 60 tablet, Rfl: 2    fenofibrate (TRICOR) 54 MG tablet, TAKE 1 TABLET BY MOUTH ONCE DAILY., Disp: 90 tablet, Rfl: 0    fluticasone propionate (FLONASE) 50 mcg/actuation nasal spray, 1 spray (50 mcg total) by Each Nostril route once daily., Disp: 16 g, Rfl: 1    hydrOXYzine (ATARAX) 50 MG tablet, TAKE 1 TABLET BY MOUTH THREE TIMES DAILY AS NEEDED FOR ITCHING OR ANXIETY., Disp: 60 tablet, Rfl: 2    ipratropium (ATROVENT) 21 mcg (0.03 %) nasal spray, 2 SPRAYS IN EACH NOSTRIL TWICE DAILY., Disp: 30 mL, Rfl: 0    meloxicam (MOBIC) 15 MG tablet, Take 1 tablet (15 mg total) by mouth once daily., Disp: 90 tablet, Rfl: 0    metoprolol succinate (TOPROL-XL) 25 MG 24 hr tablet, Take 1/2 pill in am and 1 full pill in pm, Disp: 135 tablet, Rfl: 3    montelukast (SINGULAIR) 10 mg tablet, Take 1 tablet (10 mg total) by mouth every evening., Disp: 90 tablet, Rfl: 1    nitroGLYCERIN (NITROSTAT) 0.4 MG SL tablet, Place 1 tablet (0.4 mg total) under the tongue every 5 (five) minutes as needed., Disp: 20 tablet, Rfl: 12    pantoprazole (PROTONIX) 40 MG tablet, TAKE 1 TABLET BY MOUTH ONCE DAILY., Disp: 90 tablet, Rfl: 3    pravastatin (PRAVACHOL) 40 MG tablet, TAKE 1 TABLET BY MOUTH ONCE DAILY., Disp: 90 tablet, Rfl: 0    prazosin (MINIPRESS) 1 MG Cap, Take by mouth 2 (two) times daily., Disp: , Rfl:     QUEtiapine (SEROQUEL) 50 MG tablet, Take 50 mg by mouth every evening., Disp: , Rfl:     ranolazine (RANEXA) 500 MG Tb12, Take 1 tablet (500 mg total) by mouth 2 (two) times daily., Disp: 60 tablet, Rfl: 11    valACYclovir (VALTREX) 1000 MG tablet, , Disp: , Rfl:       Review of Systems   Constitutional: Positive for malaise/fatigue.   HENT: Negative.     Eyes: Negative.    Cardiovascular:  Positive for chest pain and dyspnea on exertion.  "  Respiratory:  Positive for shortness of breath.    Endocrine: Negative.    Hematologic/Lymphatic: Negative.    Skin: Negative.    Musculoskeletal:  Positive for arthritis, back pain and joint pain.   Gastrointestinal: Negative.    Genitourinary: Negative.    Neurological: Negative.    Psychiatric/Behavioral: Negative.     Allergic/Immunologic: Negative.        /70 (BP Location: Left arm, Patient Position: Sitting, BP Method: Large (Manual))   Pulse 69   Ht 5' 6" (1.676 m)   Wt 72.3 kg (159 lb 6.3 oz)   SpO2 96%   BMI 25.73 kg/m²     Wt Readings from Last 3 Encounters:   01/19/24 72.3 kg (159 lb 6.3 oz)   12/14/23 70.9 kg (156 lb 4.9 oz)   11/29/23 71.4 kg (157 lb 6.5 oz)     Temp Readings from Last 3 Encounters:   02/17/23 97.7 °F (36.5 °C) (Temporal)   02/05/23 98.1 °F (36.7 °C) (Oral)   12/01/22 97.5 °F (36.4 °C)     BP Readings from Last 3 Encounters:   01/19/24 130/70   12/14/23 (!) 140/76   11/29/23 (!) 140/90     Pulse Readings from Last 3 Encounters:   01/19/24 69   12/14/23 76   11/29/23 71         Objective:   Physical Exam  Vitals and nursing note reviewed.   Constitutional:       General: She is not in acute distress.     Appearance: Normal appearance. She is well-developed. She is not ill-appearing or diaphoretic.   HENT:      Head: Normocephalic.   Neck:      Thyroid: No thyromegaly.      Vascular: No carotid bruit or JVD.   Cardiovascular:      Rate and Rhythm: Normal rate and regular rhythm.      Pulses: Normal pulses.           Radial pulses are 2+ on the right side and 2+ on the left side.      Heart sounds: Normal heart sounds, S1 normal and S2 normal. No murmur heard.     No friction rub. No gallop.   Pulmonary:      Effort: Pulmonary effort is normal.      Breath sounds: Normal breath sounds. No wheezing or rales.   Abdominal:      General: Bowel sounds are normal. There is no abdominal bruit.      Palpations: Abdomen is soft.      Tenderness: There is no abdominal tenderness. " "  Musculoskeletal:      Cervical back: Neck supple.   Lymphadenopathy:      Cervical: No cervical adenopathy.   Skin:     General: Skin is warm.   Neurological:      Mental Status: She is alert and oriented to person, place, and time.   Psychiatric:         Behavior: Behavior normal. Behavior is cooperative.       I have reviewed all pertinent labs and cardiac studies.        Chemistry        Component Value Date/Time     (L) 12/12/2023 1143     (L) 12/12/2023 1143    K 5.5 (H) 12/12/2023 1143    K 5.5 (H) 12/12/2023 1143    CL 95 12/12/2023 1143    CL 95 12/12/2023 1143    CO2 23 12/12/2023 1143    CO2 23 12/12/2023 1143    BUN 20 12/12/2023 1143    BUN 20 12/12/2023 1143    CREATININE 1.1 12/12/2023 1143    CREATININE 1.1 12/12/2023 1143    GLU 96 12/12/2023 1143    GLU 96 12/12/2023 1143        Component Value Date/Time    CALCIUM 9.4 12/12/2023 1143    CALCIUM 9.4 12/12/2023 1143    ALKPHOS 73 10/30/2023 0947    AST 13 10/30/2023 0947    ALT 7 (L) 10/30/2023 0947    BILITOT 0.4 10/30/2023 0947    ESTGFRAFRICA >60.0 07/26/2022 1022    EGFRNONAA >60.0 07/26/2022 1022        Lab Results   Component Value Date    WBC 7.68 10/30/2023    HGB 11.2 (L) 10/30/2023    HCT 34.0 (L) 10/30/2023    MCV 88 10/30/2023     10/30/2023       Lab Results   Component Value Date    TSH 1.020 10/30/2023     No results found for: "LABA1C", "HGBA1C"  Lab Results   Component Value Date    CHOL 253 (H) 06/16/2021    CHOL 234 (H) 01/10/2019    CHOL 216 (H) 10/03/2018     Lab Results   Component Value Date    HDL 70 06/16/2021    HDL 73 01/10/2019    HDL 77 (H) 10/03/2018     Lab Results   Component Value Date    LDLCALC 158.0 06/16/2021    LDLCALC 140.8 01/10/2019    LDLCALC 119.4 10/03/2018     No results found for: "DLDL"  Lab Results   Component Value Date    TRIG 125 06/16/2021    TRIG 101 01/10/2019    TRIG 98 10/03/2018       f1   Lab Results   Component Value Date    CHOLHDL 27.7 06/16/2021    CHOLHDL 31.2 " 01/10/2019    [facility]         Results for orders placed during the hospital encounter of 04/06/22    Echo    Interpretation Summary  · Severe left atrial enlargement.  · The left ventricle is normal in size with normal systolic function.  · The estimated ejection fraction is 55%.  · Indeterminate left ventricular diastolic function.  · Normal right ventricular size with normal right ventricular systolic function.  · Mild aortic regurgitation.  · Mild mitral regurgitation.  · Mild tricuspid regurgitation.  · Mild pulmonic regurgitation.  · Normal central venous pressure (3 mmHg).  · The estimated PA systolic pressure is 30 mmHg.        Results for orders placed during the hospital encounter of 11/08/23    Nuclear Stress - Cardiology Interpreted    Interpretation Summary    Abnormal myocardial perfusion scan.    There is a mild to moderate intensity, moderate sized, reversible perfusion abnormality that is consistent with ischemia in the mid to apical anterior wall(s).    There are no other significant perfusion abnormalities.    The gated perfusion images showed an ejection fraction of 78% at rest. The gated perfusion images showed an ejection fraction of 76% post stress. Normal ejection fraction is greater than 59%.    The ECG portion of the study is negative for ischemia.          Results for orders placed during the hospital encounter of 11/08/23    Echo    Interpretation Summary    Left Ventricle: The left ventricle is normal in size. Ventricular mass is normal. Normal wall thickness. Normal wall motion. There is normal systolic function with a visually estimated ejection fraction of 55 - 60%. There is normal diastolic function.    Right Ventricle: Normal right ventricular cavity size. Wall thickness is normal. Right ventricle wall motion  is normal. Systolic function is normal.    Tricuspid Valve: There is mild regurgitation.    Pulmonic Valve: There is mild regurgitation.    Pulmonary Artery:  The estimated pulmonary artery systolic pressure is 33 mmHg.    IVC/SVC: Normal venous pressure at 3 mmHg.      Assessment:      1. AP (angina pectoris)    2. Abnormal ECG    3. Abnormal nuclear stress test    4. Atherosclerotic PVD with intermittent claudication    5. Benign essential hypertension    6. Chronic kidney disease, stage 3a    7. VALENZUELA (dyspnea on exertion)    8. Heart palpitations    9. Nonrheumatic mitral valve regurgitation    10. Nonrheumatic aortic valve insufficiency    11. PAD (peripheral artery disease)    12. Hypo-osmolality and hyponatremia    13. Hyperkalemia        Plan:       Chest pain/angina and + stress test.  Will need LHC.  Abnormal nuclear stress test: anteroapical ischemia.  VALENZUELA may be anginal equivalent?.  Add Ranexa 500 mg bid for angina.  Sl ntg prn for concerning angina.  Pt informed how to use sl ntg and when to go to ER.  Patient advised of indications for above medications, risks/benefits and side effect profile.   Discussed indications for LHC/PCI.  Risks/benefits discussed.  CRI poses an increased risk but seems stable.  Will recheck her precath labs today to reassess her K+ and Na+ levels and if safe, will schedule LHC near future.  CRI: Continue f/u w Nephrology for her hyponatremia and hyperkalemia.  81 mg ASA qd.  Valvular heart conditions: monitor.  Abnl ecg : monitor.  Lipids: statin tx.  PAD: continue med tx.  Cardiac diet.  Exercise as tolerated.  Palpitations: beta-blocker tx. Monitor.    PHONE REVIEW.    F/U after LHC.          I have reviewed all pertinent labs and cardiac studies independently. Plans and recommendations have been formulated under my direct supervision. All questions answered and patient voiced understanding.

## 2024-01-19 ENCOUNTER — LAB VISIT (OUTPATIENT)
Dept: LAB | Facility: HOSPITAL | Age: 79
End: 2024-01-19
Attending: INTERNAL MEDICINE
Payer: MEDICARE

## 2024-01-19 ENCOUNTER — OFFICE VISIT (OUTPATIENT)
Dept: CARDIOLOGY | Facility: CLINIC | Age: 79
End: 2024-01-19
Payer: MEDICARE

## 2024-01-19 VITALS
BODY MASS INDEX: 25.61 KG/M2 | HEART RATE: 69 BPM | DIASTOLIC BLOOD PRESSURE: 70 MMHG | HEIGHT: 66 IN | WEIGHT: 159.38 LBS | SYSTOLIC BLOOD PRESSURE: 130 MMHG | OXYGEN SATURATION: 96 %

## 2024-01-19 DIAGNOSIS — I10 BENIGN ESSENTIAL HYPERTENSION: ICD-10-CM

## 2024-01-19 DIAGNOSIS — N18.31 CHRONIC KIDNEY DISEASE, STAGE 3A: ICD-10-CM

## 2024-01-19 DIAGNOSIS — E87.1 HYPO-OSMOLALITY AND HYPONATREMIA: ICD-10-CM

## 2024-01-19 DIAGNOSIS — R06.09 DOE (DYSPNEA ON EXERTION): ICD-10-CM

## 2024-01-19 DIAGNOSIS — R94.39 ABNORMAL NUCLEAR STRESS TEST: ICD-10-CM

## 2024-01-19 DIAGNOSIS — R00.2 HEART PALPITATIONS: ICD-10-CM

## 2024-01-19 DIAGNOSIS — E87.5 HYPERKALEMIA: ICD-10-CM

## 2024-01-19 DIAGNOSIS — I34.0 NONRHEUMATIC MITRAL VALVE REGURGITATION: ICD-10-CM

## 2024-01-19 DIAGNOSIS — I20.9 AP (ANGINA PECTORIS): ICD-10-CM

## 2024-01-19 DIAGNOSIS — I70.219 ATHEROSCLEROTIC PVD WITH INTERMITTENT CLAUDICATION: ICD-10-CM

## 2024-01-19 DIAGNOSIS — I35.1 NONRHEUMATIC AORTIC VALVE INSUFFICIENCY: ICD-10-CM

## 2024-01-19 DIAGNOSIS — I20.9 AP (ANGINA PECTORIS): Primary | ICD-10-CM

## 2024-01-19 DIAGNOSIS — I73.9 PAD (PERIPHERAL ARTERY DISEASE): ICD-10-CM

## 2024-01-19 DIAGNOSIS — R94.31 ABNORMAL ECG: ICD-10-CM

## 2024-01-19 LAB
ALBUMIN SERPL BCP-MCNC: 4 G/DL (ref 3.5–5.2)
ALP SERPL-CCNC: 61 U/L (ref 55–135)
ALT SERPL W/O P-5'-P-CCNC: 6 U/L (ref 10–44)
ANION GAP SERPL CALC-SCNC: 9 MMOL/L (ref 8–16)
AST SERPL-CCNC: 13 U/L (ref 10–40)
BILIRUB SERPL-MCNC: 0.4 MG/DL (ref 0.1–1)
BUN SERPL-MCNC: 21 MG/DL (ref 8–23)
CALCIUM SERPL-MCNC: 10.2 MG/DL (ref 8.7–10.5)
CHLORIDE SERPL-SCNC: 93 MMOL/L (ref 95–110)
CO2 SERPL-SCNC: 25 MMOL/L (ref 23–29)
CREAT SERPL-MCNC: 1.2 MG/DL (ref 0.5–1.4)
EST. GFR  (NO RACE VARIABLE): 46.6 ML/MIN/1.73 M^2
GLUCOSE SERPL-MCNC: 98 MG/DL (ref 70–110)
POTASSIUM SERPL-SCNC: 5 MMOL/L (ref 3.5–5.1)
PROT SERPL-MCNC: 7 G/DL (ref 6–8.4)
SODIUM SERPL-SCNC: 127 MMOL/L (ref 136–145)

## 2024-01-19 PROCEDURE — 36415 COLL VENOUS BLD VENIPUNCTURE: CPT | Performed by: INTERNAL MEDICINE

## 2024-01-19 PROCEDURE — 85730 THROMBOPLASTIN TIME PARTIAL: CPT | Performed by: INTERNAL MEDICINE

## 2024-01-19 PROCEDURE — 99215 OFFICE O/P EST HI 40 MIN: CPT | Mod: S$GLB,,, | Performed by: INTERNAL MEDICINE

## 2024-01-19 PROCEDURE — 80053 COMPREHEN METABOLIC PANEL: CPT | Performed by: INTERNAL MEDICINE

## 2024-01-19 PROCEDURE — 99999 PR PBB SHADOW E&M-EST. PATIENT-LVL II: CPT | Mod: PBBFAC,,, | Performed by: INTERNAL MEDICINE

## 2024-01-19 PROCEDURE — 85025 COMPLETE CBC W/AUTO DIFF WBC: CPT | Performed by: INTERNAL MEDICINE

## 2024-01-19 PROCEDURE — 85610 PROTHROMBIN TIME: CPT | Performed by: INTERNAL MEDICINE

## 2024-01-19 RX ORDER — RANOLAZINE 500 MG/1
500 TABLET, EXTENDED RELEASE ORAL 2 TIMES DAILY
Qty: 60 TABLET | Refills: 11 | Status: SHIPPED | OUTPATIENT
Start: 2024-01-19 | End: 2025-01-18

## 2024-01-19 RX ORDER — NITROGLYCERIN 0.4 MG/1
0.4 TABLET SUBLINGUAL EVERY 5 MIN PRN
Qty: 20 TABLET | Refills: 12 | Status: SHIPPED | OUTPATIENT
Start: 2024-01-19 | End: 2025-01-18

## 2024-01-20 LAB
BASOPHILS # BLD AUTO: 0.04 K/UL (ref 0–0.2)
BASOPHILS NFR BLD: 0.5 % (ref 0–1.9)
DIFFERENTIAL METHOD BLD: ABNORMAL
EOSINOPHIL # BLD AUTO: 0.3 K/UL (ref 0–0.5)
EOSINOPHIL NFR BLD: 3.5 % (ref 0–8)
ERYTHROCYTE [DISTWIDTH] IN BLOOD BY AUTOMATED COUNT: 12.8 % (ref 11.5–14.5)
HCT VFR BLD AUTO: 34.4 % (ref 37–48.5)
HGB BLD-MCNC: 11.3 G/DL (ref 12–16)
IMM GRANULOCYTES # BLD AUTO: 0.14 K/UL (ref 0–0.04)
IMM GRANULOCYTES NFR BLD AUTO: 1.7 % (ref 0–0.5)
LYMPHOCYTES # BLD AUTO: 1 K/UL (ref 1–4.8)
LYMPHOCYTES NFR BLD: 12.1 % (ref 18–48)
MCH RBC QN AUTO: 29.7 PG (ref 27–31)
MCHC RBC AUTO-ENTMCNC: 32.8 G/DL (ref 32–36)
MCV RBC AUTO: 91 FL (ref 82–98)
MONOCYTES # BLD AUTO: 0.6 K/UL (ref 0.3–1)
MONOCYTES NFR BLD: 7.5 % (ref 4–15)
NEUTROPHILS # BLD AUTO: 6 K/UL (ref 1.8–7.7)
NEUTROPHILS NFR BLD: 74.7 % (ref 38–73)
NRBC BLD-RTO: 0 /100 WBC
PLATELET # BLD AUTO: 353 K/UL (ref 150–450)
PMV BLD AUTO: 9.8 FL (ref 9.2–12.9)
RBC # BLD AUTO: 3.8 M/UL (ref 4–5.4)
WBC # BLD AUTO: 8.04 K/UL (ref 3.9–12.7)

## 2024-01-22 ENCOUNTER — TELEPHONE (OUTPATIENT)
Dept: CARDIOLOGY | Facility: CLINIC | Age: 79
End: 2024-01-22
Payer: MEDICARE

## 2024-01-22 ENCOUNTER — PATIENT MESSAGE (OUTPATIENT)
Dept: CARDIOLOGY | Facility: CLINIC | Age: 79
End: 2024-01-22
Payer: MEDICARE

## 2024-01-22 LAB
APTT PPP: 29.1 SEC (ref 21–32)
INR PPP: 1 (ref 0.8–1.2)
PROTHROMBIN TIME: 10.5 SEC (ref 9–12.5)

## 2024-01-22 NOTE — TELEPHONE ENCOUNTER
Telephoned patient to discuss scheduling Heart Cath  Confirmed 1/31/24 for 8am with 630am arrival   Reviewed all instructions and answered all questions      ----- Message from Jc Villavicencio MD sent at 1/20/2024  5:05 PM CST -----  Regarding: RE: Do you have a date for scheduling LHC on this patient ?  Please schedule LHC Wed 1/31/24 0800.    Thanks    Dr Villavicencio      ----- Message -----  From: Marycarmen Bello LPN  Sent: 1/19/2024   4:36 PM CST  To: Jc Villavicencio MD  Subject: Do you have a date for scheduling LHC on thi#    Good evening Dr. Villavicencio,   Noted you saw her today, with lab work and consent but no scheduling info received.  Your Staff called to check with me    ThanksMarycarmen

## 2024-01-31 ENCOUNTER — HOSPITAL ENCOUNTER (OUTPATIENT)
Facility: HOSPITAL | Age: 79
Discharge: HOME | End: 2024-01-31
Attending: INTERNAL MEDICINE | Admitting: INTERNAL MEDICINE
Payer: MEDICARE

## 2024-01-31 DIAGNOSIS — R00.2 HEART PALPITATIONS: ICD-10-CM

## 2024-01-31 DIAGNOSIS — R94.31 ABNORMAL EKG: ICD-10-CM

## 2024-01-31 DIAGNOSIS — I70.219 ATHEROSCLEROTIC PVD WITH INTERMITTENT CLAUDICATION: ICD-10-CM

## 2024-01-31 DIAGNOSIS — R94.39 ABNORMAL NUCLEAR STRESS TEST: ICD-10-CM

## 2024-01-31 DIAGNOSIS — I20.9 ANGINA PECTORIS: ICD-10-CM

## 2024-01-31 LAB — CATH EF QUANTITATIVE: 55 %

## 2024-01-31 PROCEDURE — C1769 GUIDE WIRE: HCPCS | Performed by: INTERNAL MEDICINE

## 2024-01-31 PROCEDURE — 25000003 PHARM REV CODE 250: Performed by: INTERNAL MEDICINE

## 2024-01-31 PROCEDURE — 99152 MOD SED SAME PHYS/QHP 5/>YRS: CPT | Mod: ,,, | Performed by: INTERNAL MEDICINE

## 2024-01-31 PROCEDURE — 99152 MOD SED SAME PHYS/QHP 5/>YRS: CPT | Performed by: INTERNAL MEDICINE

## 2024-01-31 PROCEDURE — 93458 L HRT ARTERY/VENTRICLE ANGIO: CPT | Performed by: INTERNAL MEDICINE

## 2024-01-31 PROCEDURE — 99153 MOD SED SAME PHYS/QHP EA: CPT | Performed by: INTERNAL MEDICINE

## 2024-01-31 PROCEDURE — 93458 L HRT ARTERY/VENTRICLE ANGIO: CPT | Mod: 26,,, | Performed by: INTERNAL MEDICINE

## 2024-01-31 PROCEDURE — 25500020 PHARM REV CODE 255: Performed by: INTERNAL MEDICINE

## 2024-01-31 PROCEDURE — C1894 INTRO/SHEATH, NON-LASER: HCPCS | Performed by: INTERNAL MEDICINE

## 2024-01-31 PROCEDURE — 63600175 PHARM REV CODE 636 W HCPCS: Performed by: INTERNAL MEDICINE

## 2024-01-31 RX ORDER — SODIUM CHLORIDE 0.9 % (FLUSH) 0.9 %
10 SYRINGE (ML) INJECTION
Status: DISCONTINUED | OUTPATIENT
Start: 2024-01-31 | End: 2024-01-31 | Stop reason: HOSPADM

## 2024-01-31 RX ORDER — SODIUM CHLORIDE 9 MG/ML
INJECTION, SOLUTION INTRAVENOUS CONTINUOUS
Status: DISCONTINUED | OUTPATIENT
Start: 2024-01-31 | End: 2024-01-31 | Stop reason: HOSPADM

## 2024-01-31 RX ORDER — ONDANSETRON 8 MG/1
8 TABLET, ORALLY DISINTEGRATING ORAL EVERY 8 HOURS PRN
Status: DISCONTINUED | OUTPATIENT
Start: 2024-01-31 | End: 2024-01-31 | Stop reason: HOSPADM

## 2024-01-31 RX ORDER — FENTANYL CITRATE 50 UG/ML
INJECTION, SOLUTION INTRAMUSCULAR; INTRAVENOUS
Status: DISCONTINUED | OUTPATIENT
Start: 2024-01-31 | End: 2024-01-31 | Stop reason: HOSPADM

## 2024-01-31 RX ORDER — DIAZEPAM 5 MG/1
5 TABLET ORAL
Status: COMPLETED | OUTPATIENT
Start: 2024-01-31 | End: 2024-01-31

## 2024-01-31 RX ORDER — MIDAZOLAM HYDROCHLORIDE 1 MG/ML
INJECTION, SOLUTION INTRAMUSCULAR; INTRAVENOUS
Status: DISCONTINUED | OUTPATIENT
Start: 2024-01-31 | End: 2024-01-31 | Stop reason: HOSPADM

## 2024-01-31 RX ORDER — MORPHINE SULFATE 2 MG/ML
2 INJECTION, SOLUTION INTRAMUSCULAR; INTRAVENOUS EVERY 4 HOURS PRN
Status: DISCONTINUED | OUTPATIENT
Start: 2024-01-31 | End: 2024-01-31 | Stop reason: HOSPADM

## 2024-01-31 RX ORDER — DIPHENHYDRAMINE HCL 50 MG
50 CAPSULE ORAL ONCE
Status: COMPLETED | OUTPATIENT
Start: 2024-01-31 | End: 2024-01-31

## 2024-01-31 RX ORDER — ASPIRIN 325 MG
325 TABLET ORAL ONCE
Status: COMPLETED | OUTPATIENT
Start: 2024-01-31 | End: 2024-01-31

## 2024-01-31 RX ORDER — LIDOCAINE HYDROCHLORIDE 20 MG/ML
INJECTION, SOLUTION EPIDURAL; INFILTRATION; INTRACAUDAL; PERINEURAL
Status: DISCONTINUED | OUTPATIENT
Start: 2024-01-31 | End: 2024-01-31 | Stop reason: HOSPADM

## 2024-01-31 RX ORDER — ACETAMINOPHEN 325 MG/1
650 TABLET ORAL EVERY 4 HOURS PRN
Status: DISCONTINUED | OUTPATIENT
Start: 2024-01-31 | End: 2024-01-31 | Stop reason: HOSPADM

## 2024-01-31 RX ORDER — SODIUM CHLORIDE 9 MG/ML
INJECTION, SOLUTION INTRAVENOUS CONTINUOUS
Status: ACTIVE | OUTPATIENT
Start: 2024-01-31 | End: 2024-01-31

## 2024-01-31 RX ADMIN — DIAZEPAM 5 MG: 5 TABLET ORAL at 07:01

## 2024-01-31 RX ADMIN — MORPHINE SULFATE 1 MG: 2 INJECTION, SOLUTION INTRAMUSCULAR; INTRAVENOUS at 11:01

## 2024-01-31 RX ADMIN — SODIUM CHLORIDE: 9 INJECTION, SOLUTION INTRAVENOUS at 07:01

## 2024-01-31 RX ADMIN — ASPIRIN 325 MG ORAL TABLET 325 MG: 325 PILL ORAL at 07:01

## 2024-01-31 RX ADMIN — DIPHENHYDRAMINE HYDROCHLORIDE 50 MG: 50 CAPSULE ORAL at 07:01

## 2024-01-31 RX ADMIN — MORPHINE SULFATE 1 MG: 2 INJECTION, SOLUTION INTRAMUSCULAR; INTRAVENOUS at 10:01

## 2024-01-31 NOTE — Clinical Note
80 ml of contrast were injected throughout the case. 0 mL of contrast was the total wasted during the case. 80 mL was the total amount used during the case.

## 2024-01-31 NOTE — Clinical Note
The catheter was inserted over the wire into the ostial  left coronary artery. Hemodynamics were performed.  An angiography was performed of the left coronary arteries. Multiple views were taken. Inserted over J wire

## 2024-01-31 NOTE — Clinical Note
The catheter was inserted over the wire into the left ventricle. Hemodynamics were performed.  and Pullback was recorded.  The angiography was performed via power injection. The injected amount was 30 mL contrast at 12 mL/s.  Catheter exchanged over J wire

## 2024-01-31 NOTE — OP NOTE
[facility] Cath Lab ([facility])  Cardiac Catheterization  Procedure and Discharge Note    SUMMARY     Renee Arteaga  [id number]  [place] Julio MD (Inactive)    Date of Procedure: 1/31/2024    Procedure: 1. LHC 2. LEFT VENTRICULOGRAM 3. CORONARY ANGIOGRAM    Provider: Jc Villavicencio MD    Indications: She was referred for cardiac catheterization to further evaluate angina.    Pre-Procedure Diagnosis: abnormal nuclear stress test, angina.    Post-Procedure Diagnosis: abnormal nuclear stress test, angina.    Anesthesia: RN IV Sedation    Description of the Findings of the Procedure:     The risks, benefits, complications, treatment options, and expected outcomes were discussed with the patient. The patient and/or family concurred with the proposed plan, giving informed consent. Patient was brought to the cath lab after IV hydration was begun and oral premedication was given.     Findings:  Luminal irregularities LAD, normal vessels overall.  EF 55%  Right femoral manual sheath removal.  See report.      Complications: None; patient tolerated the procedure well.    Estimated Blood Loss (EBL): Minimal           Implants: none    Specimens: none    Condition: stable    Disposition: PACU - hemodynamically stable.    Attestation: I was present and scrubbed for the entire procedure.    Recommendations:    Usual post cath care.  D/c pt home.  Continue current meds.  Cardiac diet.  Risk factor modification.  F/u Cards clinic 1 - 2 weeks.

## 2024-01-31 NOTE — DISCHARGE INSTRUCTIONS
"                                      Post-op Heart Catheterization    1. DIET: It is advisable for you to follow a diet that limits the intake of salt, sugar, saturated fats and cholesterol.     2. FOR THE NEXT 24 HOURS:   For the next 8 hours, you should be watched by a responsible adult. This person should make sure your condition is not getting worse.   Don't drink any alcohol for the next 24 hours.  Don't drive, operate dangerous machinery, or make important business or personal decisions during the next 24 hours.  Your healthcare provider may tell you not to take any medicine by mouth for pain or sleep in the next 4 hours. These medicines may react with the medicines you were given in the hospital. This could cause a much stronger response than usual.       3. ACTIVITY:                                Day of discharge:             NO vigorous activity, lifting or straining                                                   Day after discharge:         Avoid heavy lifting (up to 10 lbs)                                                                        The day after discharge you may shower, but avoid tub baths for 5 days                                                                         Wash site gently with soap and water        NO powder or lotion to your procedure site.                 Before you shower, remove dressing, apply bandaid if desired                                                                                                                                                                            2nd day after discharge:  Resume normal activities                                                                         Exercise program as instructed      4. WOUND CARE: It is not unusual to have a small amount of bruising appear in the groin area. It is also common to have a tender "knot" develop beneath the skin at the puncture site in the groin. This is scar tissue only and is not a " "cause for concern or alarm. This tender knot may take several weeks to fully resolve. The bruise will usually spread over several days. If the lump gets bigger, call you doctor immediately.    5. DISCOMFORT: For general discomfort at the puncture site, you may take 1 or 2 Acetaminophen (Tylenol) tablets every 4 hours as needed. (Do not take more than 4000 mg a day)                 6. CALL YOUR HEALTHCARE PROVIDER IF YOU START TO HAVE THE FOLLOWING SYMPTOMS:        1. Problems with the affected leg: Pain, discomfort, loss of warmth, numbness or tingling                                                                                                                            2. Problems at the groin site: Bleeding, pain that is sudden/sharp/persistent,                   swelling at site or a change in "lump" size, increased redness or drainage at                     puncture site                                                               3. High fever (101 degrees or higher)       4.  Drowsiness that doesn't get better       5. Weakness or dizziness that doesn't get better            6. Repeated vomiting        7. GO TO  THE EMERGENCY ROOM OR CALL 911 IF YOU HAVE: Chest pains or discomforts not relieved with 3 nitroglycerin doses (sublingual tablets or spray), numbness or severe pain or if your foot or leg becomes cold or discolored or uncontrolled bleeding from site (apply direct pressure above site).                                      Post-op Heart Catheterization    1. DIET: It is advisable for you to follow a diet that limits the intake of salt, sugar, saturated fats and cholesterol.     2. FOR THE NEXT 24 HOURS:   For the next 8 hours, you should be watched by a responsible adult. This person should make sure your condition is not getting worse.   Don't drink any alcohol for the next 24 hours.  Don't drive, operate dangerous machinery, or make important business or personal decisions during the next 24 " "hours.  Your healthcare provider may tell you not to take any medicine by mouth for pain or sleep in the next 4 hours. These medicines may react with the medicines you were given in the hospital. This could cause a much stronger response than usual.       3. ACTIVITY:                                Day of discharge:             NO vigorous activity, lifting or straining                                                   Day after discharge:         Avoid heavy lifting (up to 10 lbs)                                                                        The day after discharge you may shower, but avoid tub baths for 5 days                                                                         Wash site gently with soap and water        NO powder or lotion to your procedure site.                 Before you shower, remove dressing, apply bandaid if desired                                                                                                                                                                            2nd day after discharge:  Resume normal activities                                                                         Exercise program as instructed      4. WOUND CARE: It is not unusual to have a small amount of bruising appear in the groin area. It is also common to have a tender "knot" develop beneath the skin at the puncture site in the groin. This is scar tissue only and is not a cause for concern or alarm. This tender knot may take several weeks to fully resolve. The bruise will usually spread over several days. If the lump gets bigger, call you doctor immediately.    5. DISCOMFORT: For general discomfort at the puncture site, you may take 1 or 2 Acetaminophen (Tylenol) tablets every 4 hours as needed. (Do not take more than 4000 mg a day)                 6. CALL YOUR HEALTHCARE PROVIDER IF YOU START TO HAVE THE FOLLOWING SYMPTOMS:        1. Problems with the affected leg: Pain, " "discomfort, loss of warmth, numbness or tingling                                                                                                                            2. Problems at the groin site: Bleeding, pain that is sudden/sharp/persistent,                   swelling at site or a change in "lump" size, increased redness or drainage at                     puncture site                                                               3. High fever (101 degrees or higher)       4.  Drowsiness that doesn't get better       5. Weakness or dizziness that doesn't get better            6. Repeated vomiting        7. GO TO  THE EMERGENCY ROOM OR CALL 911 IF YOU HAVE: Chest pains or discomforts not relieved with 3 nitroglycerin doses (sublingual tablets or spray), numbness or severe pain or if your foot or leg becomes cold or discolored or uncontrolled bleeding from site (apply direct pressure above site).  "

## 2024-01-31 NOTE — PLAN OF CARE
Pt ambulated w/ RN at pt side in unit, tolerated well, Right groin dsg remained CDI, no hematoma noted    All discharge  instructions reviewed with pt and cousin both v/u   AVS given to cousin

## 2024-02-07 VITALS
TEMPERATURE: 98 F | DIASTOLIC BLOOD PRESSURE: 55 MMHG | WEIGHT: 156 LBS | SYSTOLIC BLOOD PRESSURE: 118 MMHG | RESPIRATION RATE: 20 BRPM | BODY MASS INDEX: 25.07 KG/M2 | HEIGHT: 66 IN | OXYGEN SATURATION: 100 % | HEART RATE: 64 BPM

## 2024-02-16 ENCOUNTER — TELEPHONE (OUTPATIENT)
Dept: CARDIOLOGY | Facility: CLINIC | Age: 79
End: 2024-02-16
Payer: MEDICARE

## 2024-02-16 NOTE — TELEPHONE ENCOUNTER
CHERM for patient to call back to r/s and to see if she needs o see Dr. Gresham or Dr. Villavicencio

## 2024-04-10 DIAGNOSIS — R06.02 SOB (SHORTNESS OF BREATH): Primary | ICD-10-CM

## 2024-10-09 DIAGNOSIS — I10 BENIGN ESSENTIAL HYPERTENSION: ICD-10-CM

## 2024-10-09 RX ORDER — METOPROLOL SUCCINATE 25 MG/1
TABLET, EXTENDED RELEASE ORAL
Qty: 135 TABLET | Refills: 3 | Status: SHIPPED | OUTPATIENT
Start: 2024-10-09

## 2024-10-28 ENCOUNTER — OFFICE VISIT (OUTPATIENT)
Dept: URGENT CARE | Facility: CLINIC | Age: 79
End: 2024-10-28
Payer: MEDICARE

## 2024-10-28 ENCOUNTER — HOSPITAL ENCOUNTER (OUTPATIENT)
Dept: RADIOLOGY | Facility: HOSPITAL | Age: 79
Discharge: HOME | End: 2024-10-28
Payer: MEDICARE

## 2024-10-28 VITALS
DIASTOLIC BLOOD PRESSURE: 63 MMHG | HEART RATE: 73 BPM | WEIGHT: 156 LBS | HEIGHT: 66 IN | SYSTOLIC BLOOD PRESSURE: 134 MMHG | OXYGEN SATURATION: 91 % | TEMPERATURE: 99 F | BODY MASS INDEX: 25.07 KG/M2

## 2024-10-28 DIAGNOSIS — R06.09 DOE (DYSPNEA ON EXERTION): ICD-10-CM

## 2024-10-28 DIAGNOSIS — N30.00 ACUTE CYSTITIS WITHOUT HEMATURIA: Primary | ICD-10-CM

## 2024-10-28 DIAGNOSIS — R79.81 ABNORMAL PULSE OXIMETRY: ICD-10-CM

## 2024-10-28 DIAGNOSIS — R30.0 DYSURIA: ICD-10-CM

## 2024-10-28 LAB
BILIRUBIN, UA POC OHS: ABNORMAL
BLOOD, UA POC OHS: NEGATIVE
CLARITY, UA POC OHS: ABNORMAL
COLOR, UA POC OHS: ABNORMAL
GLUCOSE, UA POC OHS: NEGATIVE
KETONES, UA POC OHS: NEGATIVE
LEUKOCYTES, UA POC OHS: ABNORMAL
NITRITE, UA POC OHS: NEGATIVE
PH, UA POC OHS: 6
PROTEIN, UA POC OHS: ABNORMAL
SPECIFIC GRAVITY, UA POC OHS: >=1.03
UROBILINOGEN, UA POC OHS: 0.2

## 2024-10-28 PROCEDURE — 99214 OFFICE O/P EST MOD 30 MIN: CPT | Mod: S$GLB,,,

## 2024-10-28 PROCEDURE — 71046 X-RAY EXAM CHEST 2 VIEWS: CPT | Mod: TC,PO

## 2024-10-28 PROCEDURE — 71046 X-RAY EXAM CHEST 2 VIEWS: CPT | Mod: 26,,, | Performed by: RADIOLOGY

## 2024-10-28 PROCEDURE — 81003 URINALYSIS AUTO W/O SCOPE: CPT | Mod: QW,S$GLB,,

## 2024-10-28 PROCEDURE — 87086 URINE CULTURE/COLONY COUNT: CPT

## 2024-10-28 RX ORDER — NITROFURANTOIN 25; 75 MG/1; MG/1
100 CAPSULE ORAL 2 TIMES DAILY
Qty: 10 CAPSULE | Refills: 0 | Status: SHIPPED | OUTPATIENT
Start: 2024-10-28

## 2024-10-30 LAB
BACTERIA UR CULT: NORMAL
BACTERIA UR CULT: NORMAL
